# Patient Record
Sex: FEMALE | Race: WHITE | NOT HISPANIC OR LATINO | Employment: UNEMPLOYED | URBAN - METROPOLITAN AREA
[De-identification: names, ages, dates, MRNs, and addresses within clinical notes are randomized per-mention and may not be internally consistent; named-entity substitution may affect disease eponyms.]

---

## 2017-01-09 ENCOUNTER — APPOINTMENT (OUTPATIENT)
Dept: LAB | Facility: CLINIC | Age: 82
End: 2017-01-09
Payer: MEDICARE

## 2017-01-09 ENCOUNTER — TRANSCRIBE ORDERS (OUTPATIENT)
Dept: LAB | Facility: CLINIC | Age: 82
End: 2017-01-09

## 2017-01-09 DIAGNOSIS — I10 ESSENTIAL HYPERTENSION, MALIGNANT: ICD-10-CM

## 2017-01-09 DIAGNOSIS — E78.00 PURE HYPERCHOLESTEROLEMIA: ICD-10-CM

## 2017-01-09 DIAGNOSIS — E78.00 PURE HYPERCHOLESTEROLEMIA: Primary | ICD-10-CM

## 2017-01-09 LAB
ALBUMIN SERPL BCP-MCNC: 3.2 G/DL (ref 3.5–5)
ALP SERPL-CCNC: 72 U/L (ref 46–116)
ALT SERPL W P-5'-P-CCNC: 17 U/L (ref 12–78)
ANION GAP SERPL CALCULATED.3IONS-SCNC: 7 MMOL/L (ref 4–13)
AST SERPL W P-5'-P-CCNC: 13 U/L (ref 5–45)
BILIRUB SERPL-MCNC: 0.45 MG/DL (ref 0.2–1)
BUN SERPL-MCNC: 26 MG/DL (ref 5–25)
CALCIUM SERPL-MCNC: 8.9 MG/DL (ref 8.3–10.1)
CHLORIDE SERPL-SCNC: 106 MMOL/L (ref 100–108)
CHOLEST SERPL-MCNC: 150 MG/DL (ref 50–200)
CO2 SERPL-SCNC: 27 MMOL/L (ref 21–32)
CREAT SERPL-MCNC: 1.17 MG/DL (ref 0.6–1.3)
GFR SERPL CREATININE-BSD FRML MDRD: 43.1 ML/MIN/1.73SQ M
GLUCOSE SERPL-MCNC: 99 MG/DL (ref 65–140)
HDLC SERPL-MCNC: 59 MG/DL (ref 40–60)
LDLC SERPL CALC-MCNC: 70 MG/DL (ref 0–100)
POTASSIUM SERPL-SCNC: 3.8 MMOL/L (ref 3.5–5.3)
PROT SERPL-MCNC: 6.4 G/DL (ref 6.4–8.2)
SODIUM SERPL-SCNC: 140 MMOL/L (ref 136–145)
TRIGL SERPL-MCNC: 105 MG/DL

## 2017-01-09 PROCEDURE — 80061 LIPID PANEL: CPT

## 2017-01-09 PROCEDURE — 36415 COLL VENOUS BLD VENIPUNCTURE: CPT

## 2017-01-09 PROCEDURE — 80053 COMPREHEN METABOLIC PANEL: CPT

## 2017-01-17 ENCOUNTER — ALLSCRIPTS OFFICE VISIT (OUTPATIENT)
Dept: OTHER | Facility: OTHER | Age: 82
End: 2017-01-17

## 2017-03-06 ENCOUNTER — TRANSCRIBE ORDERS (OUTPATIENT)
Dept: ADMINISTRATIVE | Facility: HOSPITAL | Age: 82
End: 2017-03-06

## 2017-03-06 DIAGNOSIS — I25.10 CVD (CARDIOVASCULAR DISEASE): Primary | ICD-10-CM

## 2017-03-13 ENCOUNTER — HOSPITAL ENCOUNTER (OUTPATIENT)
Dept: RADIOLOGY | Facility: HOSPITAL | Age: 82
Discharge: HOME | End: 2017-03-13
Attending: INTERNAL MEDICINE
Payer: MEDICARE

## 2017-03-13 ENCOUNTER — HOSPITAL ENCOUNTER (OUTPATIENT)
Dept: NON INVASIVE DIAGNOSTICS | Facility: HOSPITAL | Age: 82
Discharge: HOME | End: 2017-03-13
Attending: INTERNAL MEDICINE
Payer: MEDICARE

## 2017-03-13 ENCOUNTER — GENERIC CONVERSION - ENCOUNTER (OUTPATIENT)
Dept: OTHER | Facility: OTHER | Age: 82
End: 2017-03-13

## 2017-03-13 DIAGNOSIS — I25.10 CVD (CARDIOVASCULAR DISEASE): ICD-10-CM

## 2017-03-13 PROCEDURE — 78452 HT MUSCLE IMAGE SPECT MULT: CPT

## 2017-03-13 PROCEDURE — A9502 TC99M TETROFOSMIN: HCPCS

## 2017-03-13 PROCEDURE — 93017 CV STRESS TEST TRACING ONLY: CPT

## 2017-03-13 RX ADMIN — REGADENOSON 0.4 MG: 0.08 INJECTION, SOLUTION INTRAVENOUS at 11:04

## 2017-04-16 ENCOUNTER — APPOINTMENT (EMERGENCY)
Dept: RADIOLOGY | Facility: HOSPITAL | Age: 82
End: 2017-04-16
Payer: MEDICARE

## 2017-04-16 ENCOUNTER — HOSPITAL ENCOUNTER (OUTPATIENT)
Facility: HOSPITAL | Age: 82
Setting detail: OBSERVATION
Discharge: HOME | End: 2017-04-17
Attending: EMERGENCY MEDICINE | Admitting: INTERNAL MEDICINE
Payer: MEDICARE

## 2017-04-16 DIAGNOSIS — I10 HTN (HYPERTENSION): ICD-10-CM

## 2017-04-16 DIAGNOSIS — R42 DIZZINESS: ICD-10-CM

## 2017-04-16 DIAGNOSIS — R42 VERTIGO: Primary | ICD-10-CM

## 2017-04-16 PROBLEM — E78.5 DYSLIPIDEMIA: Status: ACTIVE | Noted: 2017-04-16

## 2017-04-16 PROBLEM — M19.90 ARTHRITIS: Status: ACTIVE | Noted: 2017-04-16

## 2017-04-16 LAB
ALBUMIN SERPL BCP-MCNC: 3.2 G/DL (ref 3.5–5)
ALP SERPL-CCNC: 80 U/L (ref 46–116)
ALT SERPL W P-5'-P-CCNC: 27 U/L (ref 12–78)
ANION GAP SERPL CALCULATED.3IONS-SCNC: 9 MMOL/L (ref 4–13)
AST SERPL W P-5'-P-CCNC: 22 U/L (ref 5–45)
BASOPHILS # BLD AUTO: 0 THOUSANDS/ΜL (ref 0–0.1)
BASOPHILS NFR BLD AUTO: 0 % (ref 0–1)
BILIRUB SERPL-MCNC: 0.4 MG/DL (ref 0.2–1)
BILIRUB UR QL STRIP: NEGATIVE
BUN SERPL-MCNC: 23 MG/DL (ref 5–25)
CALCIUM SERPL-MCNC: 9.3 MG/DL (ref 8.3–10.1)
CHLORIDE SERPL-SCNC: 107 MMOL/L (ref 100–108)
CLARITY UR: CLEAR
CO2 SERPL-SCNC: 30 MMOL/L (ref 21–32)
COLOR UR: YELLOW
CREAT SERPL-MCNC: 0.98 MG/DL (ref 0.6–1.3)
EOSINOPHIL # BLD AUTO: 0.1 THOUSAND/ΜL (ref 0–0.61)
EOSINOPHIL NFR BLD AUTO: 1 % (ref 0–6)
ERYTHROCYTE [DISTWIDTH] IN BLOOD BY AUTOMATED COUNT: 13.4 % (ref 11.6–15.1)
GFR SERPL CREATININE-BSD FRML MDRD: 52.8 ML/MIN/1.73SQ M
GLUCOSE SERPL-MCNC: 119 MG/DL (ref 65–140)
GLUCOSE UR STRIP-MCNC: NEGATIVE MG/DL
HCT VFR BLD AUTO: 35.4 % (ref 37–47)
HGB BLD-MCNC: 11.6 G/DL (ref 12–16)
HGB UR QL STRIP.AUTO: NEGATIVE
KETONES UR STRIP-MCNC: NEGATIVE MG/DL
LEUKOCYTE ESTERASE UR QL STRIP: NEGATIVE
LYMPHOCYTES # BLD AUTO: 1 THOUSANDS/ΜL (ref 0.6–4.47)
LYMPHOCYTES NFR BLD AUTO: 12 % (ref 14–44)
MAGNESIUM SERPL-MCNC: 2.1 MG/DL (ref 1.6–2.6)
MCH RBC QN AUTO: 31.2 PG (ref 27–31)
MCHC RBC AUTO-ENTMCNC: 32.7 G/DL (ref 31.4–37.4)
MCV RBC AUTO: 95 FL (ref 82–98)
MONOCYTES # BLD AUTO: 0.7 THOUSAND/ΜL (ref 0.17–1.22)
MONOCYTES NFR BLD AUTO: 9 % (ref 4–12)
NEUTROPHILS # BLD AUTO: 6.1 THOUSANDS/ΜL (ref 1.85–7.62)
NEUTS SEG NFR BLD AUTO: 78 % (ref 43–75)
NITRITE UR QL STRIP: NEGATIVE
NRBC BLD AUTO-RTO: 0 /100 WBCS
PH UR STRIP.AUTO: 7 [PH] (ref 5–9)
PLATELET # BLD AUTO: 166 THOUSANDS/UL (ref 130–400)
PMV BLD AUTO: 8 FL (ref 8.9–12.7)
POTASSIUM SERPL-SCNC: 4.3 MMOL/L (ref 3.5–5.3)
PROT SERPL-MCNC: 6.3 G/DL (ref 6.4–8.2)
PROT UR STRIP-MCNC: NEGATIVE MG/DL
RBC # BLD AUTO: 3.71 MILLION/UL (ref 4.2–5.4)
SODIUM SERPL-SCNC: 146 MMOL/L (ref 136–145)
SP GR UR STRIP.AUTO: 1.01 (ref 1–1.03)
TROPONIN I SERPL-MCNC: <0.02 NG/ML
TROPONIN I SERPL-MCNC: <0.02 NG/ML
TSH SERPL DL<=0.05 MIU/L-ACNC: 2.05 UIU/ML (ref 0.36–3.74)
UROBILINOGEN UR QL STRIP.AUTO: 0.2 E.U./DL
WBC # BLD AUTO: 7.8 THOUSAND/UL (ref 4.8–10.8)

## 2017-04-16 PROCEDURE — 84484 ASSAY OF TROPONIN QUANT: CPT | Performed by: HOSPITALIST

## 2017-04-16 PROCEDURE — 81003 URINALYSIS AUTO W/O SCOPE: CPT | Performed by: EMERGENCY MEDICINE

## 2017-04-16 PROCEDURE — 99285 EMERGENCY DEPT VISIT HI MDM: CPT

## 2017-04-16 PROCEDURE — 83735 ASSAY OF MAGNESIUM: CPT | Performed by: EMERGENCY MEDICINE

## 2017-04-16 PROCEDURE — 71010 HB CHEST X-RAY 1 VIEW FRONTAL (PORTABLE): CPT

## 2017-04-16 PROCEDURE — 85025 COMPLETE CBC W/AUTO DIFF WBC: CPT | Performed by: EMERGENCY MEDICINE

## 2017-04-16 PROCEDURE — 84443 ASSAY THYROID STIM HORMONE: CPT | Performed by: EMERGENCY MEDICINE

## 2017-04-16 PROCEDURE — 36415 COLL VENOUS BLD VENIPUNCTURE: CPT | Performed by: EMERGENCY MEDICINE

## 2017-04-16 PROCEDURE — 84484 ASSAY OF TROPONIN QUANT: CPT | Performed by: EMERGENCY MEDICINE

## 2017-04-16 PROCEDURE — 70450 CT HEAD/BRAIN W/O DYE: CPT

## 2017-04-16 PROCEDURE — 96360 HYDRATION IV INFUSION INIT: CPT

## 2017-04-16 PROCEDURE — 93005 ELECTROCARDIOGRAM TRACING: CPT | Performed by: EMERGENCY MEDICINE

## 2017-04-16 PROCEDURE — 87081 CULTURE SCREEN ONLY: CPT | Performed by: HOSPITALIST

## 2017-04-16 PROCEDURE — 80053 COMPREHEN METABOLIC PANEL: CPT | Performed by: EMERGENCY MEDICINE

## 2017-04-16 RX ORDER — MECLIZINE HYDROCHLORIDE 25 MG/1
25 TABLET ORAL ONCE
Status: COMPLETED | OUTPATIENT
Start: 2017-04-16 | End: 2017-04-16

## 2017-04-16 RX ORDER — KETOROLAC TROMETHAMINE 5 MG/ML
1 SOLUTION OPHTHALMIC 4 TIMES DAILY
Status: DISCONTINUED | OUTPATIENT
Start: 2017-04-16 | End: 2017-04-17

## 2017-04-16 RX ORDER — METOPROLOL TARTRATE 50 MG/1
50 TABLET, FILM COATED ORAL EVERY 12 HOURS SCHEDULED
Status: DISCONTINUED | OUTPATIENT
Start: 2017-04-16 | End: 2017-04-17

## 2017-04-16 RX ORDER — PRAVASTATIN SODIUM 20 MG
10 TABLET ORAL
Status: DISCONTINUED | OUTPATIENT
Start: 2017-04-16 | End: 2017-04-17 | Stop reason: HOSPADM

## 2017-04-16 RX ORDER — ANTIOX #8/OM3/DHA/EPA/LUT/ZEAX 250-2.5 MG
CAPSULE ORAL
COMMUNITY
End: 2018-06-15

## 2017-04-16 RX ORDER — TRAMADOL HYDROCHLORIDE 50 MG/1
50 TABLET ORAL EVERY 8 HOURS PRN
Status: DISCONTINUED | OUTPATIENT
Start: 2017-04-16 | End: 2017-04-17 | Stop reason: HOSPADM

## 2017-04-16 RX ORDER — ASPIRIN 81 MG/1
81 TABLET, CHEWABLE ORAL DAILY
Status: DISCONTINUED | OUTPATIENT
Start: 2017-04-17 | End: 2017-04-17 | Stop reason: HOSPADM

## 2017-04-16 RX ADMIN — MECLIZINE HYDROCHLORIDE 25 MG: 25 TABLET ORAL at 14:34

## 2017-04-16 RX ADMIN — PRAVASTATIN SODIUM 10 MG: 20 TABLET ORAL at 18:08

## 2017-04-16 RX ADMIN — SODIUM CHLORIDE 500 ML: 0.9 INJECTION, SOLUTION INTRAVENOUS at 12:57

## 2017-04-16 RX ADMIN — METOPROLOL TARTRATE 50 MG: 50 TABLET ORAL at 21:22

## 2017-04-16 RX ADMIN — METOPROLOL TARTRATE 50 MG: 50 TABLET ORAL at 13:49

## 2017-04-17 ENCOUNTER — APPOINTMENT (OUTPATIENT)
Dept: RADIOLOGY | Facility: HOSPITAL | Age: 82
End: 2017-04-17
Payer: MEDICARE

## 2017-04-17 VITALS
SYSTOLIC BLOOD PRESSURE: 130 MMHG | BODY MASS INDEX: 24.69 KG/M2 | TEMPERATURE: 98.1 F | RESPIRATION RATE: 16 BRPM | DIASTOLIC BLOOD PRESSURE: 59 MMHG | HEART RATE: 57 BPM | HEIGHT: 65 IN | WEIGHT: 148.2 LBS | OXYGEN SATURATION: 98 %

## 2017-04-17 PROBLEM — I25.10 CAD (CORONARY ARTERY DISEASE): Status: ACTIVE | Noted: 2017-04-17

## 2017-04-17 LAB
ANION GAP SERPL CALCULATED.3IONS-SCNC: 7 MMOL/L (ref 4–13)
ATRIAL RATE: 69 BPM
BUN SERPL-MCNC: 23 MG/DL (ref 5–25)
CALCIUM SERPL-MCNC: 9 MG/DL (ref 8.3–10.1)
CHLORIDE SERPL-SCNC: 108 MMOL/L (ref 100–108)
CO2 SERPL-SCNC: 29 MMOL/L (ref 21–32)
CREAT SERPL-MCNC: 0.88 MG/DL (ref 0.6–1.3)
ERYTHROCYTE [DISTWIDTH] IN BLOOD BY AUTOMATED COUNT: 13.1 % (ref 11.6–15.1)
GFR SERPL CREATININE-BSD FRML MDRD: 59.8 ML/MIN/1.73SQ M
GLUCOSE SERPL-MCNC: 87 MG/DL (ref 65–140)
HCT VFR BLD AUTO: 32.5 % (ref 37–47)
HGB BLD-MCNC: 10.6 G/DL (ref 12–16)
MAGNESIUM SERPL-MCNC: 2.1 MG/DL (ref 1.6–2.6)
MCH RBC QN AUTO: 31.3 PG (ref 27–31)
MCHC RBC AUTO-ENTMCNC: 32.6 G/DL (ref 31.4–37.4)
MCV RBC AUTO: 96 FL (ref 82–98)
P AXIS: 76 DEGREES
PLATELET # BLD AUTO: 175 THOUSANDS/UL (ref 130–400)
PMV BLD AUTO: 8.5 FL (ref 8.9–12.7)
POTASSIUM SERPL-SCNC: 4.2 MMOL/L (ref 3.5–5.3)
PR INTERVAL: 202 MS
QRS AXIS: 45 DEGREES
QRSD INTERVAL: 86 MS
QT INTERVAL: 398 MS
QTC INTERVAL: 426 MS
RBC # BLD AUTO: 3.39 MILLION/UL (ref 4.2–5.4)
SODIUM SERPL-SCNC: 144 MMOL/L (ref 136–145)
T WAVE AXIS: 45 DEGREES
VENTRICULAR RATE: 69 BPM
WBC # BLD AUTO: 9.1 THOUSAND/UL (ref 4.8–10.8)

## 2017-04-17 PROCEDURE — 83735 ASSAY OF MAGNESIUM: CPT | Performed by: HOSPITALIST

## 2017-04-17 PROCEDURE — 85027 COMPLETE CBC AUTOMATED: CPT | Performed by: HOSPITALIST

## 2017-04-17 PROCEDURE — 80048 BASIC METABOLIC PNL TOTAL CA: CPT | Performed by: HOSPITALIST

## 2017-04-17 PROCEDURE — 93880 EXTRACRANIAL BILAT STUDY: CPT

## 2017-04-17 RX ORDER — MECLIZINE HCL 12.5 MG/1
12.5 TABLET ORAL EVERY 8 HOURS SCHEDULED
Qty: 9 TABLET | Refills: 0 | Status: SHIPPED | OUTPATIENT
Start: 2017-04-17 | End: 2018-06-15

## 2017-04-17 RX ORDER — MECLIZINE HCL 12.5 MG/1
12.5 TABLET ORAL EVERY 8 HOURS SCHEDULED
Status: DISCONTINUED | OUTPATIENT
Start: 2017-04-17 | End: 2017-04-17 | Stop reason: HOSPADM

## 2017-04-17 RX ADMIN — MECLIZINE HCL 12.5 MG: 12.5 TABLET ORAL at 14:13

## 2017-04-17 RX ADMIN — METOPROLOL TARTRATE 50 MG: 50 TABLET ORAL at 09:51

## 2017-04-17 RX ADMIN — LOSARTAN POTASSIUM: 50 TABLET, FILM COATED ORAL at 09:51

## 2017-04-17 RX ADMIN — KETOROLAC TROMETHAMINE 1 DROP: 5 SOLUTION OPHTHALMIC at 09:52

## 2017-04-17 RX ADMIN — ASPIRIN 81 MG CHEWABLE TABLET 81 MG: 81 TABLET CHEWABLE at 09:51

## 2017-04-17 RX ADMIN — PRAVASTATIN SODIUM 10 MG: 20 TABLET ORAL at 16:14

## 2017-04-18 LAB — MRSA NOSE QL CULT: NORMAL

## 2017-07-17 DIAGNOSIS — I25.2 OLD MYOCARDIAL INFARCTION: ICD-10-CM

## 2017-07-21 ENCOUNTER — APPOINTMENT (OUTPATIENT)
Dept: LAB | Facility: CLINIC | Age: 82
End: 2017-07-21
Payer: MEDICARE

## 2017-07-21 DIAGNOSIS — I25.2 OLD MYOCARDIAL INFARCTION: Primary | ICD-10-CM

## 2017-07-21 LAB
ALBUMIN SERPL BCP-MCNC: 3.1 G/DL (ref 3.5–5)
ALP SERPL-CCNC: 80 U/L (ref 46–116)
ALT SERPL W P-5'-P-CCNC: 18 U/L (ref 12–78)
ANION GAP SERPL CALCULATED.3IONS-SCNC: 7 MMOL/L (ref 4–13)
AST SERPL W P-5'-P-CCNC: 17 U/L (ref 5–45)
BILIRUB SERPL-MCNC: 0.32 MG/DL (ref 0.2–1)
BUN SERPL-MCNC: 22 MG/DL (ref 5–25)
CALCIUM SERPL-MCNC: 8.8 MG/DL (ref 8.3–10.1)
CHLORIDE SERPL-SCNC: 109 MMOL/L (ref 100–108)
CHOLEST SERPL-MCNC: 160 MG/DL (ref 50–200)
CO2 SERPL-SCNC: 27 MMOL/L (ref 21–32)
CREAT SERPL-MCNC: 0.98 MG/DL (ref 0.6–1.3)
GFR SERPL CREATININE-BSD FRML MDRD: 52.8 ML/MIN/1.73SQ M
GLUCOSE P FAST SERPL-MCNC: 81 MG/DL (ref 65–99)
HDLC SERPL-MCNC: 51 MG/DL (ref 40–60)
LDLC SERPL CALC-MCNC: 78 MG/DL (ref 0–100)
POTASSIUM SERPL-SCNC: 4.1 MMOL/L (ref 3.5–5.3)
PROT SERPL-MCNC: 6.4 G/DL (ref 6.4–8.2)
SODIUM SERPL-SCNC: 143 MMOL/L (ref 136–145)
TRIGL SERPL-MCNC: 153 MG/DL

## 2017-07-21 PROCEDURE — 80053 COMPREHEN METABOLIC PANEL: CPT

## 2017-07-21 PROCEDURE — 80061 LIPID PANEL: CPT

## 2017-07-21 PROCEDURE — 36415 COLL VENOUS BLD VENIPUNCTURE: CPT

## 2017-07-25 ENCOUNTER — ALLSCRIPTS OFFICE VISIT (OUTPATIENT)
Dept: OTHER | Facility: OTHER | Age: 82
End: 2017-07-25

## 2018-01-10 ENCOUNTER — TRANSCRIBE ORDERS (OUTPATIENT)
Dept: ADMINISTRATIVE | Facility: HOSPITAL | Age: 83
End: 2018-01-10

## 2018-01-10 ENCOUNTER — ALLSCRIPTS OFFICE VISIT (OUTPATIENT)
Dept: OTHER | Facility: OTHER | Age: 83
End: 2018-01-10

## 2018-01-10 DIAGNOSIS — I25.2 OLD MYOCARDIAL INFARCTION: ICD-10-CM

## 2018-01-10 DIAGNOSIS — I25.10 ATHEROSCLEROSIS OF NATIVE CORONARY ARTERY WITHOUT ANGINA PECTORIS, UNSPECIFIED WHETHER NATIVE OR TRANSPLANTED HEART: Primary | ICD-10-CM

## 2018-01-12 NOTE — PROGRESS NOTES
Assessment   Assessed    1  Abnormal EKG (794 31) (R94 31)   2  CAD (coronary artery disease) (414 00) (I25 10)   3  Aortic valve sclerosis (424 1) (I35 8)   4  CKD (chronic kidney disease), stage III (585 3) (N18 3)   5  Dyslipidemia (272 4) (E78 5)   6  HTN (hypertension) (401 9) (I10)   7  Mitral and aortic regurgitation (396 3) (I08 0)   8  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 (412) (I25 2)   9  Status post placement of stent in right coronary artery (V45 82) (Z95 5)    Plan   Aortic valve sclerosis, CAD (coronary artery disease), CKD (chronic kidney disease),    stage III, Health Maintenance    · (1) COMPREHENSIVE METABOLIC PANEL; Status:Active; Requested TNA:[id number]; Perform:[facility]; RBP:[id number];GUTIBBL; For:Aortic valve sclerosis, CAD (coronary artery disease), CKD (chronic kidney disease), stage III, Health Maintenance; Ordered By:Steven Gomez;  CAD (coronary artery disease), HTN (hypertension), Mitral and aortic regurgitation    · EKG/ECG- POC; Status:Complete;   Done: [id number]   Perform: In Office; Due:10Jan2019; Last Updated By:Mitra Bennett; 1/10/2018 1:04:03 PM;Ordered; For:CAD (coronary artery disease), HTN (hypertension), Mitral and aortic regurgitation; Ordered By:Steven Gomez;  CAD (coronary artery disease), 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    · ECHO COMPLETE WITH CONTRAST IF INDICATED; Status:Need Information - Financial    Authorization; Requested for:02Apr2018; Perform:[facility]; Order Comments:New inferolateral Q-waves and new T inversions in inferior leads  Please evaluate for regional wall motion abnormality involving inferolateral segments ; Due:02Apr2019; Last Updated Peyton Reyes; 1/10/2018 1:58:14 PM;Ordered; For:CAD (coronary artery disease), 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; Ordered By:Steven Gomez;  Dyslipidemia    · (1) LIPID PANEL, FASTING; Status:Active; Requested [facility]:[id number];     Perform:[facility]; FSP:[id number];SHVUQWS; For:Dyslipidemia; Ordered By:Steven Gomez;           1  Take morning and evening blood pressures for 4 consecutive days, doing 3 readings at a time, meaning 3 each morning and evening  Write down every measurement and either bring them to the office of Dr Mahi Steve, mail them, e-mail them, or fax them  2   Echocardiogram at [facility] to be done April, 2018 regarding new EKG findings  3   Blood work to be done on or after June 24, 2018 with office follow-up with Dr Mahi Steve in July, 2018, with EKG  Discussion/Summary   Cardiology Discussion Summary Free Text Note Form [facility]:       Stable CAD with old inferior infarct and PCI of RCA 11/18/13 but new EKG changes of inferolateral myocardial infarction compared to 6 months ago  Murmurs of aortic valve sclerosis and mitral regurgitation with known aortic valve sclerosis and MAC as well as trace aortic regurgitation on 7/19/16 echocardiogram Stable mild CKD 3 with history of dehydration and YECENIA 3  Status post symptomatic bradycardia as metoprolol adverse drug reaction 8/15 History of resolved aerophagia and questionable hematuria History of noncritical disease of LAD and LCx on 11/18/13 cardiac catheterization with normal nuclear stress test on 3/13/17  Controlled dyslipidemia Uncontrolled systolic hypertension, stage II  Exclude white coat phenomenon  Osteoarthritis and questionable rheumatoid arthritis             Goals and Barriers: The patient has the current Goals: Maintenance of cardiovascular health  The patent has the current Barriers: Advancing age and frailty  Patient's Capacity to Self-Care: Patient is able to Self-Care  Patient Education: Educational resources provided: Patient and  were given printed instructions explaining plan of care  Medication SE Review and Pt Understands Tx: The treatment plan was reviewed with the patient/guardian   The patient/guardian understands and agrees with the treatment plan    Counseling Documentation With Imm: The patient, patient's family was counseled regarding diagnostic results,-- instructions for management,-- risk factor reductions,-- prognosis,-- patient and family education,-- impressions,-- importance of compliance with treatment  Self Referrals:    Self Referrals: Yes    Transitional Care: Co-manage care with PCP/Referring Provider  Chief Complaint   Chief Complaint Free Text Note Form: Chuck Thompson is here today for a six month followup  She denies any cardiac complaitns in the office today  An EKG was done in the office  JW    Chief Complaint Chronic Condition [facility]: Patient is here today for follow up of chronic conditions described in HPI  History of Present Illness   Cardiology HPI Free Text Note Form [facility]:    51-year-old woman accompanied by her  complains of declining energy level and moving about slowly but denies any episodes of chest discomfort, abdominal pain, dyspnea, falls, syncope, edema, palpitations  There have been no specific cardiopulmonary or new medical symptoms except for slight lightheadedness from time to time  No medication changes have occurred  Review of Systems   ROS Reviewed:    ROS reviewed  All other systems negative, except as noted in history of present illness  Active Problems   Problems    1  Aortic valve sclerosis (424 1) (I35 8)   2  CAD (coronary artery disease) (414 00) (I25 10)   3  CKD (chronic kidney disease), stage III (585 3) (N18 3)   4  Dyslipidemia (272 4) (E78 5)   5  HTN (hypertension) (401 9) (I10)   6  Indigestion (536 8) (K30)   7  Mitral and aortic regurgitation (396 3) (I08 0)   8  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 (412) (I25 2)   9  Osteoarthritis (715 90) (M19 90)   10  Status post placement of stent in right coronary artery (V45 82) (Z95 5)    Past Medical History   Active Problems And Past Medical History Reviewed:     The active problems and past medical history were reviewed and updated today  Surgical History   Problems    1  History of Appendectomy   2  History of Oophorectomy   3  History of Tonsillectomy  Surgical History Reviewed: The surgical history was reviewed and updated today  Family History   Mother    1  No pertinent family history  Family History    2  No pertinent family history  Family History Reviewed: The family history was reviewed and updated today  Social History   Problems    · Never a smoker   · Rarely consumes alcohol (V49 89) (Z78 9)  Social History Reviewed: The social history was reviewed and is unchanged  Current Meds    1  Aspirin 81 MG CAPS; Therapy: (Recorded:17Jan2017) to Recorded   2  Losartan Potassium-HCTZ 100-25 MG Oral Tablet; TAKE 1 TABLET DAILY  Requested     for: 34Utw9969; Last Rx:[id number] Ordered   3  PreserVision AREDS Oral Capsule; TAKE AS DIRECTED; Therapy: 28SRQ1229 to Recorded   4  Simvastatin 5 MG Oral Tablet; TAKE 1 TABLET AT BEDTIME  Requested for: 19Apr2017;     Last Rx:19Apr2017 Ordered   5  TraMADol HCl - 50 MG Oral Tablet; TAKE 1 TABLET EVERY 12 HOURS AS NEEDED; Therapy: 14XZR2196 to (Evaluate:45Iwr6959) Recorded  Medication List Reviewed: The medication list was reviewed and updated today  Allergies   Medication    1  No Known Drug Allergies    Vitals   Vital Signs    Recorded: 54YDC2261 01:13PM   Heart Rate 80, Apical   Systolic 717, RUE, Sitting   Diastolic 65, RUE, Sitting   BP Comments End of exam   Height 5 ft 2 in   Weight 149 lb    BMI Calculated 27 25   BSA Calculated 1 69   O2 Saturation 92, RA     Physical Exam        Constitutional - General appearance: No acute distress, well appearing and well nourished  Eyes - Conjunctiva and Sclera examination: Conjunctiva pink, sclera anicteric  Neck - Normal, no JVD   Pulmonary - Respiratory effort: No signs of respiratory distress  -- Auscultation of lungs: Clear to auscultation  Cardiovascular - Auscultation of heart: Abnormal  -- Grade 2/6 aortic and apical systolic ejection murmurs with no gallop, click, rub  Systolic murmur radiates to both clavicles  -- Pedal pulses: Abnormal  -- Difficult to palpate but both feet are equally warm  -- Examination of extremities for edema and/or varicosities: Normal        Abdomen - Soft  Musculoskeletal - Gait and station: Normal gait  Inspection/palpation of joints, bones, and muscles: Abnormal-- Osteoarthritis changes in knees and fingers  Skin - Skin: Normal without rashes  Skin is warm and well perfused  Neurologic - Speech normal  No focal deficits        Psychiatric - Orientation to person, place, and time: Normal       Results/Data   ECG Report:  01/10/2018    new inferolateral myocardial infarction with new inferolateral Q-waves in inferior T inversions compared to 07/25/2017 R-wave progression            Signatures    Electronically signed by : GAVIN Conley ; Aj 10 2018  4:02PM EST                       (Author)

## 2018-01-13 VITALS
WEIGHT: 150.4 LBS | BODY MASS INDEX: 27.68 KG/M2 | OXYGEN SATURATION: 95 % | HEART RATE: 74 BPM | HEIGHT: 62 IN | SYSTOLIC BLOOD PRESSURE: 126 MMHG | DIASTOLIC BLOOD PRESSURE: 68 MMHG

## 2018-01-14 VITALS
OXYGEN SATURATION: 95 % | WEIGHT: 151.13 LBS | HEART RATE: 73 BPM | SYSTOLIC BLOOD PRESSURE: 130 MMHG | HEIGHT: 62 IN | BODY MASS INDEX: 27.81 KG/M2 | DIASTOLIC BLOOD PRESSURE: 70 MMHG

## 2018-01-23 VITALS
WEIGHT: 149 LBS | HEIGHT: 62 IN | OXYGEN SATURATION: 92 % | SYSTOLIC BLOOD PRESSURE: 145 MMHG | HEART RATE: 80 BPM | DIASTOLIC BLOOD PRESSURE: 65 MMHG | BODY MASS INDEX: 27.42 KG/M2

## 2018-02-13 RX ORDER — VIT A/VIT C/VIT E/ZINC/COPPER 4296-226
CAPSULE ORAL
COMMUNITY
Start: 2017-07-25 | End: 2021-01-01 | Stop reason: HOSPADM

## 2018-02-13 RX ORDER — AMOXICILLIN 500 MG/1
500 CAPSULE ORAL AS NEEDED
Refills: 0 | COMMUNITY
Start: 2018-02-08 | End: 2019-02-03 | Stop reason: HOSPADM

## 2018-02-13 RX ORDER — LOSARTAN POTASSIUM AND HYDROCHLOROTHIAZIDE 25; 100 MG/1; MG/1
1 TABLET ORAL DAILY
COMMUNITY
End: 2018-05-21 | Stop reason: SDUPTHER

## 2018-02-13 RX ORDER — AMLODIPINE BESYLATE 5 MG/1
1 TABLET ORAL DAILY
COMMUNITY
Start: 2018-01-17 | End: 2018-02-16 | Stop reason: SDUPTHER

## 2018-02-16 ENCOUNTER — OFFICE VISIT (OUTPATIENT)
Dept: CARDIOLOGY CLINIC | Facility: CLINIC | Age: 83
End: 2018-02-16
Payer: MEDICARE

## 2018-02-16 VITALS
BODY MASS INDEX: 24.66 KG/M2 | HEIGHT: 65 IN | SYSTOLIC BLOOD PRESSURE: 118 MMHG | HEART RATE: 78 BPM | OXYGEN SATURATION: 98 % | DIASTOLIC BLOOD PRESSURE: 62 MMHG | WEIGHT: 148 LBS

## 2018-02-16 DIAGNOSIS — I10 ESSENTIAL HYPERTENSION: Primary | ICD-10-CM

## 2018-02-16 DIAGNOSIS — I25.10 CORONARY ARTERY DISEASE INVOLVING NATIVE CORONARY ARTERY OF NATIVE HEART WITHOUT ANGINA PECTORIS: ICD-10-CM

## 2018-02-16 DIAGNOSIS — E78.5 DYSLIPIDEMIA: ICD-10-CM

## 2018-02-16 PROCEDURE — 99214 OFFICE O/P EST MOD 30 MIN: CPT | Performed by: INTERNAL MEDICINE

## 2018-02-16 RX ORDER — AMLODIPINE BESYLATE 5 MG/1
5 TABLET ORAL DAILY
Qty: 30 TABLET | Refills: 5 | Status: SHIPPED | OUTPATIENT
Start: 2018-02-16 | End: 2019-02-15 | Stop reason: SDUPTHER

## 2018-02-16 NOTE — PROGRESS NOTES
Cardiology Progress Note    ASSESSMENT:    1  Improved uncontrolled systolic hypertension, stage II with addition of amlodipine 5 mg daily about 1 month ago  2  Stable CAD with old inferior infarct and PCI of RCA 11/18/13 but new EKG changes of inferolateral myocardial infarction compared to 6 months ago  3  Murmurs of aortic valve sclerosis and mitral regurgitation with known aortic valve sclerosis and MAC as well as trace aortic regurgitation on 7/19/16 echocardiogram   4  Stable mild CKD 3 with history of dehydration and YECENIA 3    5  Status post symptomatic bradycardia as metoprolol adverse drug reaction 8/15   6  History of resolved aerophagia and questionable hematuria   7  History of noncritical disease of LAD and LCx on 11/18/13 cardiac catheterization with normal nuclear stress test on 3/13/17  8  Controlled dyslipidemia   9  Osteoarthritis and questionable rheumatoid arthritis  10  Frequent extrasystoles on today's examination  Plan     Patient Instructions     1  Continue present medications  2   Spur prescription was sent to her local pharmacy for amlodipine 5 mg daily with 30 day supply and 5 refills ordered  3   Echo Doppler to be done in April, 2018  4   Office follow-up in July, 2018 with preceding blood work previously requested an to be done several weeks before visit  5  In view of asymptomatic extrasystoles on current exam, will consider EKG or Holter monitor if still present on follow-up  6   Four consecutive day blood pressure recheck in the next several weeks by Dr Katrina Anthony  HPI  This 80 y o  female  denies new cardiopulmonary and medical symptoms  She continues to complain of chronic stable fatigue and occasional lightheadedness      Review of Systems    All other systems negative, except as noted in history of present illness    Historical Information   Past Medical History:   Diagnosis Date    Abnormal EKG     Aortic valve stenosis     Arthritis     CKD (chronic kidney disease), stage III     Coronary artery disease     emergent PCI 11/18/13    Hypertension     Mitral and aortic regurgitation     Myocardial infarction 11/2013    with 1 stent     Past Surgical History:   Procedure Laterality Date    APPENDECTOMY      BILATERAL OOPHORECTOMY      CORONARY STENT PLACEMENT  2013    after MI (x1 stent)    DILATION AND CURETTAGE OF UTERUS      JOINT REPLACEMENT Left     knee    MA REMV CATARACT EXTRACAP,INSERT LENS Left 12/19/2016    Procedure: EXTRACTION EXTRACAPSULAR CATARACT PHACO INTRAOCULAR LENS (IOL); Surgeon: Katie Cook MD;  Location: [facility] MAIN OR;  Service: Ophthalmology    TONSILLECTOMY       History   Alcohol Use    Yes     Comment: drinks whiskey 2 x week     History   Drug Use No     History   Smoking Status    Never Smoker   Smokeless Tobacco    Never Used       Family History:  Family History   Problem Relation Age of Onset    Heart disease Sister      enlarged heart         Meds/Allergies     Prior to Admission medications    Medication Sig Start Date End Date Taking?  Authorizing Provider   amLODIPine (NORVASC) 5 mg tablet Take 1 tablet (5 mg total) by mouth daily for 30 days 2/16/18 3/18/18 Yes Mitzi Pritchard MD   aspirin 81 MG tablet Take 81 mg by mouth 2 (two) times a day   Yes Historical Provider, MD   losartan-hydrochlorothiazide (HYZAAR) 100-25 MG per tablet Take 1 tablet by mouth daily   Yes Historical Provider, MD   Multiple Vitamins-Minerals (PRESERVISION AREDS 2) CAPS Take by mouth   Yes Historical Provider, MD   Multiple Vitamins-Minerals (PRESERVISION AREDS) CAPS Take by mouth 7/25/17  Yes Historical Provider, MD   SIMVASTATIN PO Take 5 mg by mouth daily at bedtime   Yes Historical Provider, MD   amLODIPine (NORVASC) 5 mg tablet Take 1 tablet by mouth daily 1/17/18 2/16/18 Yes Historical Provider, MD   amoxicillin (AMOXIL) 500 mg capsule Take 500 mg by mouth 3 (three) times a day 2/8/18   Historical Provider, MD   meclizine (ANTIVERT) 12 5 MG tablet Take 1 tablet by mouth every 8 (eight) hours for 3 days 4/17/17 4/20/17  Lennox Fitz, MD   traMADol (ULTRAM) 50 mg tablet Take 50 mg by mouth every 8 (eight) hours as needed for moderate pain    Historical Provider, MD       Allergies   Allergen Reactions    Metoprolol Other (See Comments)     According to previous medical record, you developed bradycardia and pre-syncope in 2015 while on metoprolol ER 25mg, so please DO NOT take metoprolol or other beta blocker  Vitals:    02/16/18 1306   BP: 118/62   BP Location: Right arm   Patient Position: Sitting   Cuff Size: Standard   Pulse: 78   SpO2: 98%   Weight: 67 1 kg (148 lb)   Height: 5' 5" (1 651 m)       Body mass index is 24 63 kg/m²    1 lb weight loss in 5 weeks    Physical Exam:    General Appearance:  Alert, cooperative, no distress, appears stated age   Head:  Normocephalic, without obvious abnormality, atraumatic   Eyes:  PERRL, conjunctiva/corneas clear, EOM's intact,   both eyes   Ears:  Normal TM's and external ear canals, both ears   Nose: Nares normal, septum midline, mucosa normal, no drainage or sinus tenderness   Throat: Lips, mucosa, and tongue normal; teeth and gums normal   Neck: Supple, symmetrical, trachea midline, no adenopathy, thyroid: not enlarged, symmetric, no tenderness/mass/nodules, no carotid bruit or JVD   Back:   Symmetric, no curvature, ROM normal, no CVA tenderness   Lungs:   Clear to auscultation bilaterally, respirations unlabored   Chest Wall:  No tenderness or deformity   Heart:  Frequent extrasystoles are noted, S1, S2 normal, grade 6-5/1 systolic ejection murmur, best heard in aortic region with radiation to right more than left sternal border, right clavicle, and apex with normal A2 but no rub or gallop   Abdomen:   Soft, non-tender, bowel sounds active all four quadrants,  no masses, no organomegaly   Extremities: Extremities normal, atraumatic, no cyanosis or edema There are changes of osteoarthritis in bilateral knees and fingers  Pulses: Not readily palpable in both feet  Skin: Skin showed normal color, texture, turgor and no rashes or lesions   Lymph nodes: Cervical, supraclavicular, and axillary nodes normal   Neurologic: Normal         Cardiographics  ECG :  Not applicable    Imaging  Chest X-Ray:  No Chest XR results available for this patient            Lab Review     Lab Results   Component Value Date     07/21/2017    K 4 1 07/21/2017     (H) 07/21/2017    CO2 27 07/21/2017    ANIONGAP 7 07/21/2017    BUN 22 07/21/2017    CREATININE 0 98 07/21/2017    GLUCOSE 87 04/17/2017    GLUF 81 07/21/2017    CALCIUM 8 8 07/21/2017    AST 17 07/21/2017    ALT 18 07/21/2017    ALKPHOS 80 07/21/2017    PROT 6 4 07/21/2017    BILITOT 0 32 07/21/2017    EGFR 52 8 07/21/2017       Lab Results   Component Value Date    CHOL 160 07/21/2017    CHOL 150 01/09/2017     Lab Results   Component Value Date    HDL 51 07/21/2017    HDL 59 01/09/2017     Lab Results   Component Value Date    LDLCALC 78 07/21/2017    LDLCALC 70 01/09/2017     Lab Results   Component Value Date    TRIG 153 (H) 07/21/2017    TRIG 105 01/09/2017     No components found for: CHOLHDL    Lab Results   Component Value Date    GLUCOSE 87 04/17/2017    CALCIUM 8 8 07/21/2017     07/21/2017    K 4 1 07/21/2017    CO2 27 07/21/2017     (H) 07/21/2017    BUN 22 07/21/2017    CREATININE 0 98 07/21/2017           Helen Campbell MD

## 2018-02-16 NOTE — PATIENT INSTRUCTIONS
1   Continue present medications  2   Spur prescription was sent to her local pharmacy for amlodipine 5 mg daily with 30 day supply and 5 refills ordered  3   Echo Doppler to be done in April, 2018  4   Office follow-up in July, 2018 with preceding blood work previously requested an to be done several weeks before visit  5  In view of asymptomatic extrasystoles on current exam, will consider EKG or Holter monitor if still present on follow-up  6   Four consecutive day blood pressure recheck in the next several weeks by Dr Ousmane Yang

## 2018-04-03 ENCOUNTER — HOSPITAL ENCOUNTER (OUTPATIENT)
Dept: NON INVASIVE DIAGNOSTICS | Facility: HOSPITAL | Age: 83
Discharge: HOME | End: 2018-04-03
Attending: INTERNAL MEDICINE
Payer: MEDICARE

## 2018-04-03 DIAGNOSIS — I25.2 OLD MYOCARDIAL INFARCTION: ICD-10-CM

## 2018-04-03 DIAGNOSIS — I25.10 ATHEROSCLEROSIS OF NATIVE CORONARY ARTERY WITHOUT ANGINA PECTORIS, UNSPECIFIED WHETHER NATIVE OR TRANSPLANTED HEART: ICD-10-CM

## 2018-04-03 PROCEDURE — 93306 TTE W/DOPPLER COMPLETE: CPT

## 2018-04-04 ENCOUNTER — TELEPHONE (OUTPATIENT)
Dept: CARDIOLOGY CLINIC | Facility: CLINIC | Age: 83
End: 2018-04-04

## 2018-04-04 PROCEDURE — 93306 TTE W/DOPPLER COMPLETE: CPT | Performed by: INTERNAL MEDICINE

## 2018-04-24 ENCOUNTER — TELEPHONE (OUTPATIENT)
Dept: CARDIOLOGY CLINIC | Facility: CLINIC | Age: 83
End: 2018-04-24

## 2018-04-24 NOTE — TELEPHONE ENCOUNTER
Dr Brian Steinberg called with a couple of blood pressure readings in regards to Amlodipine  He unfortunately does not have dates or times for any of the readings given       154/54  112/58  140/61  153/64  162/70  145/55  136/58  138/59

## 2018-04-25 NOTE — TELEPHONE ENCOUNTER
Average systolic blood pressure approximately 142, which is probably acceptable for patient of this age  No change in current management recommended

## 2018-05-14 DIAGNOSIS — E78.5 DYSLIPIDEMIA: Primary | ICD-10-CM

## 2018-05-15 RX ORDER — SIMVASTATIN 5 MG
5 TABLET ORAL
Qty: 90 TABLET | Refills: 3 | Status: SHIPPED | OUTPATIENT
Start: 2018-05-15 | End: 2018-07-25 | Stop reason: ALTCHOICE

## 2018-05-21 DIAGNOSIS — I10 HYPERTENSION, UNSPECIFIED TYPE: Primary | ICD-10-CM

## 2018-05-21 DIAGNOSIS — I25.2 OLD MI (MYOCARDIAL INFARCTION): ICD-10-CM

## 2018-05-21 DIAGNOSIS — E78.5 DYSLIPIDEMIA: ICD-10-CM

## 2018-05-21 RX ORDER — SIMVASTATIN 5 MG
5 TABLET ORAL
Qty: 90 TABLET | Refills: 3 | Status: CANCELLED | OUTPATIENT
Start: 2018-05-21

## 2018-05-22 RX ORDER — LOSARTAN POTASSIUM AND HYDROCHLOROTHIAZIDE 25; 100 MG/1; MG/1
1 TABLET ORAL DAILY
Qty: 90 TABLET | Refills: 3 | Status: SHIPPED | OUTPATIENT
Start: 2018-05-22 | End: 2019-06-19 | Stop reason: SDUPTHER

## 2018-05-22 NOTE — TELEPHONE ENCOUNTER
Pt wanted to be sure this Rx was sent to [facility] today  To Dr Carlitos Cooper for signature

## 2018-05-30 ENCOUNTER — APPOINTMENT (OUTPATIENT)
Dept: LAB | Facility: CLINIC | Age: 83
End: 2018-05-30
Payer: MEDICARE

## 2018-05-30 DIAGNOSIS — N18.30 CHRONIC KIDNEY DISEASE, STAGE III (MODERATE) (HCC): ICD-10-CM

## 2018-05-30 DIAGNOSIS — I25.119 ATHEROSCLEROSIS OF NATIVE CORONARY ARTERY WITH ANGINA PECTORIS, UNSPECIFIED WHETHER NATIVE OR TRANSPLANTED HEART (HCC): ICD-10-CM

## 2018-05-30 DIAGNOSIS — R00.8 CANNONBALL PULSE: Primary | ICD-10-CM

## 2018-05-30 LAB
ALBUMIN SERPL BCP-MCNC: 2.9 G/DL (ref 3.5–5)
ALP SERPL-CCNC: 74 U/L (ref 46–116)
ALT SERPL W P-5'-P-CCNC: 20 U/L (ref 12–78)
ANION GAP SERPL CALCULATED.3IONS-SCNC: 9 MMOL/L (ref 4–13)
AST SERPL W P-5'-P-CCNC: 16 U/L (ref 5–45)
BILIRUB SERPL-MCNC: 0.36 MG/DL (ref 0.2–1)
BUN SERPL-MCNC: 23 MG/DL (ref 5–25)
CALCIUM SERPL-MCNC: 8.8 MG/DL (ref 8.3–10.1)
CHLORIDE SERPL-SCNC: 104 MMOL/L (ref 100–108)
CHOLEST SERPL-MCNC: 194 MG/DL (ref 50–200)
CO2 SERPL-SCNC: 24 MMOL/L (ref 21–32)
CREAT SERPL-MCNC: 1.24 MG/DL (ref 0.6–1.3)
GFR SERPL CREATININE-BSD FRML MDRD: 37 ML/MIN/1.73SQ M
GLUCOSE P FAST SERPL-MCNC: 96 MG/DL (ref 65–99)
HDLC SERPL-MCNC: 46 MG/DL (ref 40–60)
LDLC SERPL CALC-MCNC: 110 MG/DL (ref 0–100)
NONHDLC SERPL-MCNC: 148 MG/DL
POTASSIUM SERPL-SCNC: 3.9 MMOL/L (ref 3.5–5.3)
PROT SERPL-MCNC: 6.2 G/DL (ref 6.4–8.2)
SODIUM SERPL-SCNC: 137 MMOL/L (ref 136–145)
TRIGL SERPL-MCNC: 191 MG/DL

## 2018-05-30 PROCEDURE — 36415 COLL VENOUS BLD VENIPUNCTURE: CPT

## 2018-05-30 PROCEDURE — 80061 LIPID PANEL: CPT

## 2018-05-30 PROCEDURE — 80053 COMPREHEN METABOLIC PANEL: CPT

## 2018-06-15 NOTE — PRE-PROCEDURE INSTRUCTIONS
Pre-Surgery Instructions:   Medication Instructions    amLODIPine (NORVASC) 5 mg tablet Instructed patient per Anesthesia Guidelines   amoxicillin (AMOXIL) 500 mg capsule Instructed patient per Anesthesia Guidelines   aspirin 81 MG tablet Instructed patient per Anesthesia Guidelines   losartan-hydrochlorothiazide (HYZAAR) 100-25 MG per tablet Instructed patient per Anesthesia Guidelines   Multiple Vitamins-Minerals (PRESERVISION AREDS) CAPS Instructed patient per Anesthesia Guidelines   simvastatin (ZOCOR) 5 MG tablet Instructed patient per Anesthesia Guidelines  Pre op instructions reviewed with pt via phone  Instructed to take losartan a m of surgery    Rubi Espinosa, no cell phone, staying with patient during procedure  My Surgical Experience    The following information was developed to assist you to prepare for your operation  What do I need to do before coming to the hospital?   Arrange for a responsible person to drive you to and from the hospital    Arrange care for your children at home  Children are not allowed in the recovery areas of the hospital   Plan to wear clothing that is easy to put on and take off  If you are having shoulder surgery, wear a shirt that buttons or zippers in the front  Bathing  o Shower the evening before and the morning of your surgery with an antibacterial soap  Please refer to the Pre Op Showering Instructions for Surgery Patients Sheet   o Remove nail polish and all body piercing jewelry  o Do not shave any body part for at least 24 hours before surgery-this includes face, arms, legs and upper body  Food  o Nothing to eat or drink after midnight the night before your surgery   This includes candy and chewing gum  o Exception: If your surgery is after 12:00pm (noon), you may have clear liquids such as 7-Up®, ginger ale, apple or cranberry juice, Jell-O®, water, or clear broth until 8:00 am  o Do not drink milk or juice with pulp on the morning before surgery  o Do not drink alcohol 24 hours before surgery  Medicine  o Follow instructions you received from your surgeon about which medicines you may take on the day of surgery  o If instructed to take medicine on the morning of surgery, take pills with just a small sip of water  Call your prescribing doctor for specific information on what to do if you take insulin    What should I bring to the hospital?    Bring:  Shahid Charisse or a walker, if you have them, for foot or knee surgery   A list of the daily medicines, vitamins, minerals, herbals and nutritional supplements you take  Include the dosages of medicines and the time you take them each day   Glasses, dentures or hearing aids   Minimal clothing; you will be wearing hospital sleepwear   Photo ID; required to verify your identity   If you have a Living Will or Power of , bring a copy of the documents   If you have an ostomy, bring an extra pouch and any supplies you use    Do not bring   Medicines or inhalers   Money, valuables or jewelry    What other information should I know about the day of surgery?  Notify your surgeons if you develop a cold, sore throat, cough, fever, rash or any other illness   Report to the Ambulatory Surgical/Same Day Surgery Unit   You will be instructed to stop at Registration only if you have not been pre-registered   Inform your  fi they do not stay that they will be asked by the staff to leave a phone number where they can be reached   Be available to be reached before surgery  In the event the operating room schedule changes, you may be asked to come in earlier or later than expected    *It is important to tell your doctor and others involved in your health care if you are taking or have been taking any non-prescription drugs, vitamins, minerals, herbals or other nutritional supplements   Any of these may interact with some food or medicines and cause a reaction

## 2018-06-17 ENCOUNTER — ANESTHESIA EVENT (OUTPATIENT)
Dept: PERIOP | Facility: AMBULARY SURGERY CENTER | Age: 83
End: 2018-06-17
Payer: MEDICARE

## 2018-06-18 ENCOUNTER — ANESTHESIA (OUTPATIENT)
Dept: PERIOP | Facility: AMBULARY SURGERY CENTER | Age: 83
End: 2018-06-18
Payer: MEDICARE

## 2018-06-18 ENCOUNTER — HOSPITAL ENCOUNTER (OUTPATIENT)
Facility: AMBULARY SURGERY CENTER | Age: 83
Setting detail: OUTPATIENT SURGERY
Discharge: HOME | End: 2018-06-18
Attending: OPHTHALMOLOGY | Admitting: OPHTHALMOLOGY
Payer: MEDICARE

## 2018-06-18 VITALS
DIASTOLIC BLOOD PRESSURE: 79 MMHG | RESPIRATION RATE: 16 BRPM | TEMPERATURE: 97.6 F | OXYGEN SATURATION: 94 % | HEART RATE: 78 BPM | SYSTOLIC BLOOD PRESSURE: 157 MMHG

## 2018-06-18 DIAGNOSIS — H25.11 AGE-RELATED NUCLEAR CATARACT OF RIGHT EYE: Primary | ICD-10-CM

## 2018-06-18 PROCEDURE — V2632 POST CHMBR INTRAOCULAR LENS: HCPCS | Performed by: OPHTHALMOLOGY

## 2018-06-18 DEVICE — ACRYSOF(R) IQ ASPHERIC NATURAL IOL, SINGLE-PIECE ACRYLIC FOLDABLE PCL, UV WITH BLUE LIGHTFILTER, 13.0MM LENGTH, 6.0MM ANTERIORASYMMETRIC BICONVEX OPTIC, PLANAR HAPTICS.
Type: IMPLANTABLE DEVICE | Site: EYE | Status: FUNCTIONAL
Brand: ACRYSOF®

## 2018-06-18 RX ORDER — KETOROLAC TROMETHAMINE 5 MG/ML
1 SOLUTION OPHTHALMIC
Status: COMPLETED | OUTPATIENT
Start: 2018-06-18 | End: 2018-06-18

## 2018-06-18 RX ORDER — CYCLOPENTOLATE HYDROCHLORIDE 10 MG/ML
1 SOLUTION/ DROPS OPHTHALMIC
Status: COMPLETED | OUTPATIENT
Start: 2018-06-18 | End: 2018-06-18

## 2018-06-18 RX ORDER — TETRACAINE HYDROCHLORIDE 5 MG/ML
SOLUTION OPHTHALMIC AS NEEDED
Status: DISCONTINUED | OUTPATIENT
Start: 2018-06-18 | End: 2018-06-18 | Stop reason: HOSPADM

## 2018-06-18 RX ORDER — FENTANYL CITRATE 50 UG/ML
INJECTION, SOLUTION INTRAMUSCULAR; INTRAVENOUS AS NEEDED
Status: DISCONTINUED | OUTPATIENT
Start: 2018-06-18 | End: 2018-06-18 | Stop reason: SURG

## 2018-06-18 RX ORDER — GATIFLOXACIN 5 MG/ML
1 SOLUTION/ DROPS OPHTHALMIC 2 TIMES DAILY
Qty: 3 ML | Refills: 0
Start: 2018-06-18 | End: 2019-08-14 | Stop reason: ALTCHOICE

## 2018-06-18 RX ORDER — MIDAZOLAM HYDROCHLORIDE 1 MG/ML
INJECTION INTRAMUSCULAR; INTRAVENOUS AS NEEDED
Status: DISCONTINUED | OUTPATIENT
Start: 2018-06-18 | End: 2018-06-18 | Stop reason: SURG

## 2018-06-18 RX ORDER — GATIFLOXACIN 5 MG/ML
SOLUTION/ DROPS OPHTHALMIC AS NEEDED
Status: DISCONTINUED | OUTPATIENT
Start: 2018-06-18 | End: 2018-06-18 | Stop reason: HOSPADM

## 2018-06-18 RX ORDER — PHENYLEPHRINE HCL 2.5 %
1 DROPS OPHTHALMIC (EYE)
Status: COMPLETED | OUTPATIENT
Start: 2018-06-18 | End: 2018-06-18

## 2018-06-18 RX ORDER — BALANCED SALT SOLUTION 6.4; .75; .48; .3; 3.9; 1.7 MG/ML; MG/ML; MG/ML; MG/ML; MG/ML; MG/ML
SOLUTION OPHTHALMIC AS NEEDED
Status: DISCONTINUED | OUTPATIENT
Start: 2018-06-18 | End: 2018-06-18 | Stop reason: HOSPADM

## 2018-06-18 RX ORDER — TETRACAINE HYDROCHLORIDE 5 MG/ML
1 SOLUTION OPHTHALMIC ONCE
Status: COMPLETED | OUTPATIENT
Start: 2018-06-18 | End: 2018-06-18

## 2018-06-18 RX ORDER — LIDOCAINE HYDROCHLORIDE 10 MG/ML
INJECTION, SOLUTION EPIDURAL; INFILTRATION; INTRACAUDAL; PERINEURAL AS NEEDED
Status: DISCONTINUED | OUTPATIENT
Start: 2018-06-18 | End: 2018-06-18 | Stop reason: HOSPADM

## 2018-06-18 RX ORDER — LIDOCAINE HYDROCHLORIDE 20 MG/ML
1 JELLY TOPICAL
Status: COMPLETED | OUTPATIENT
Start: 2018-06-18 | End: 2018-06-18

## 2018-06-18 RX ADMIN — CYCLOPENTOLATE HYDROCHLORIDE 1 DROP: 10 SOLUTION/ DROPS OPHTHALMIC at 10:15

## 2018-06-18 RX ADMIN — MIDAZOLAM HYDROCHLORIDE 1 MG: 1 INJECTION, SOLUTION INTRAMUSCULAR; INTRAVENOUS at 11:15

## 2018-06-18 RX ADMIN — PHENYLEPHRINE HYDROCHLORIDE 1 DROP: 25 SOLUTION/ DROPS OPHTHALMIC at 10:45

## 2018-06-18 RX ADMIN — PHENYLEPHRINE HYDROCHLORIDE 1 DROP: 25 SOLUTION/ DROPS OPHTHALMIC at 11:01

## 2018-06-18 RX ADMIN — LIDOCAINE HYDROCHLORIDE 1 APPLICATION: 20 JELLY TOPICAL at 10:45

## 2018-06-18 RX ADMIN — KETOROLAC TROMETHAMINE 1 DROP: 5 SOLUTION OPHTHALMIC at 11:01

## 2018-06-18 RX ADMIN — KETOROLAC TROMETHAMINE 1 DROP: 5 SOLUTION OPHTHALMIC at 10:45

## 2018-06-18 RX ADMIN — CYCLOPENTOLATE HYDROCHLORIDE 1 DROP: 10 SOLUTION/ DROPS OPHTHALMIC at 10:30

## 2018-06-18 RX ADMIN — LIDOCAINE HYDROCHLORIDE 1 APPLICATION: 20 JELLY TOPICAL at 10:15

## 2018-06-18 RX ADMIN — FENTANYL CITRATE 50 MCG: 50 INJECTION, SOLUTION INTRAMUSCULAR; INTRAVENOUS at 11:15

## 2018-06-18 RX ADMIN — KETOROLAC TROMETHAMINE 1 DROP: 5 SOLUTION OPHTHALMIC at 10:30

## 2018-06-18 RX ADMIN — KETOROLAC TROMETHAMINE 1 DROP: 5 SOLUTION OPHTHALMIC at 10:15

## 2018-06-18 RX ADMIN — CYCLOPENTOLATE HYDROCHLORIDE 1 DROP: 10 SOLUTION/ DROPS OPHTHALMIC at 10:45

## 2018-06-18 RX ADMIN — CYCLOPENTOLATE HYDROCHLORIDE 1 DROP: 10 SOLUTION/ DROPS OPHTHALMIC at 11:00

## 2018-06-18 RX ADMIN — PHENYLEPHRINE HYDROCHLORIDE 1 DROP: 25 SOLUTION/ DROPS OPHTHALMIC at 10:30

## 2018-06-18 RX ADMIN — TETRACAINE HYDROCHLORIDE 1 DROP: 5 SOLUTION OPHTHALMIC at 10:15

## 2018-06-18 RX ADMIN — PHENYLEPHRINE HYDROCHLORIDE 1 DROP: 25 SOLUTION/ DROPS OPHTHALMIC at 10:15

## 2018-06-18 RX ADMIN — LIDOCAINE HYDROCHLORIDE 1 APPLICATION: 20 JELLY TOPICAL at 10:30

## 2018-06-18 RX ADMIN — LIDOCAINE HYDROCHLORIDE 1 APPLICATION: 20 JELLY TOPICAL at 11:00

## 2018-06-18 NOTE — DISCHARGE INSTRUCTIONS
Dr Nerissa Vaughn Cataract Instructions    Activity:     1  No Driving until instructed   2  Keep shield on until seen tomorrow except when administering drops   3  No heavy lifting   4  No water in eye     Diet:     1  Resume normal diet    Normal Symptoms:     1  Mild Headache   2  Scratchy or picky feeling around eye    Call the office if:     1  You have any questions or concerns   2  If eye pain is not relieved by extra strength tylenol    Office phone number:  [phone]      Next appointment:     1  See Dr Nerissa Vaughn at his office tomorrow as scheduled   ____1015am______________________________________________________   2  Bring blue eye kit with you and eyedrops to the office    A new set of comprehensive instructions will be given and reviewed with you during your office visit tomorrow

## 2018-06-18 NOTE — ANESTHESIA PREPROCEDURE EVALUATION
Review of Systems/Medical History  Patient summary reviewed  Chart reviewed  No history of anesthetic complications     Cardiovascular  Hyperlipidemia, Hypertension , Valvular heart disease , aortic valve stenosis, mitral regurgitation and aortic insufficiency, Past MI , CAD , Cardiac stents (2013)  History of percutaneous transluminal coronary angioplasty,    Pulmonary       GI/Hepatic       Chronic kidney disease ,        Endo/Other     GYN       Hematology   Musculoskeletal    Comment: S/p joint replacement knee Arthritis     Neurology   Psychology           Physical Exam    Airway    Mallampati score: II  TM Distance: >3 FB  Neck ROM: full     Dental       Cardiovascular  Rhythm: regular, Rate: normal,     Pulmonary  Breath sounds clear to auscultation,     Other Findings        Anesthesia Plan  ASA Score- 3     Anesthesia Type- IV sedation with anesthesia with ASA Monitors  Additional Monitors:   Airway Plan:         Plan Factors-    Induction- intravenous  Postoperative Plan-     Informed Consent- Anesthetic plan and risks discussed with patient

## 2018-06-18 NOTE — OP NOTE
OPERATIVE REPORT    PATIENT NAME: Ofe Banks    :  1922  MRN: [id number]  Pt Location: [facility] OR ROOM 01    Surgery Date: 2018    Surgeon(s) and Role:     * Selena Sal MD - Primary    Age-related nuclear cataract of right eye [H25 11]    Post-Op Diagnosis Codes:     * Age-related nuclear cataract of right eye [H25 11]    Procedure(s):  EXTRACTION EXTRACAPSULAR CATARACT PHACO INTRAOCULAR LENS (IOL)    Anesthesia Type:   IV Sedation with Anesthesia    Operative Indications:  Age-related nuclear cataract of right eye [H25 11]  Decreased vision to 20/40  With problems reading and watching television  Pt requested cataract sx the right eye    Procedure and Technique:    Procedure Details     The patient was brought in the OR in stable condition and placed on the operative table  The right eye was prepped and draped in the usual sterile manner  Attention was directed to the right eye where a lid speculum was placed  A 2 4 mm clear corneal incision was made temperally  1/2 cc of 1% MPF Lidocaine was irrigated into the anterior chamber followed by viscoat  The pupil was found to be small and not safe to proceed  A Beehler Dilator was used to stretch the pupil and made it an adequate size to proceed without difficulty  The side port incision was placed superiorly  The capsularrhexis was made and the nucleus was hydrodissected with BSS  The nucleus was then removed with the phaco handpiece followed by removal of the cortical material with the I/A handpiece  The capsular bag was then filled with Provisc  The IOL was folded and placed in to the capsular bag and centered well  The remaining Provisc was removed from the eye with the I/A  The wounds were hydrated with BSS and found to be water tight  The lid speculum was removed and 2 drops of Gatifloxicin were placed over the cornea  A protective eye shield was taped over the eye and the patient went to PACU in stable condition   I will see the patient in the office tomorrow and the expected post op period is a few weeks         Complications: None        Disposition: PACU   Condition: Stable    SIGNATURE: Elio Lopez MD  DATE: June 18, 2018  TIME: 11:41 AM

## 2018-07-25 ENCOUNTER — OFFICE VISIT (OUTPATIENT)
Dept: CARDIOLOGY CLINIC | Facility: CLINIC | Age: 83
End: 2018-07-25
Payer: MEDICARE

## 2018-07-25 VITALS
WEIGHT: 149.6 LBS | BODY MASS INDEX: 27.53 KG/M2 | OXYGEN SATURATION: 96 % | DIASTOLIC BLOOD PRESSURE: 72 MMHG | HEART RATE: 72 BPM | SYSTOLIC BLOOD PRESSURE: 144 MMHG | HEIGHT: 62 IN

## 2018-07-25 DIAGNOSIS — Z98.61 CAD S/P PERCUTANEOUS CORONARY ANGIOPLASTY: ICD-10-CM

## 2018-07-25 DIAGNOSIS — I49.9 CARDIAC ARRHYTHMIA, UNSPECIFIED CARDIAC ARRHYTHMIA TYPE: ICD-10-CM

## 2018-07-25 DIAGNOSIS — I10 ESSENTIAL HYPERTENSION: Primary | ICD-10-CM

## 2018-07-25 DIAGNOSIS — I25.10 CAD S/P PERCUTANEOUS CORONARY ANGIOPLASTY: ICD-10-CM

## 2018-07-25 DIAGNOSIS — I08.0 MITRAL AND AORTIC REGURGITATION: ICD-10-CM

## 2018-07-25 DIAGNOSIS — I25.2 OLD INFERIOR WALL MYOCARDIAL INFARCTION: ICD-10-CM

## 2018-07-25 DIAGNOSIS — E78.5 DYSLIPIDEMIA: ICD-10-CM

## 2018-07-25 DIAGNOSIS — N18.30 CHRONIC KIDNEY DISEASE, STAGE III (MODERATE) (HCC): ICD-10-CM

## 2018-07-25 PROCEDURE — 99214 OFFICE O/P EST MOD 30 MIN: CPT | Performed by: INTERNAL MEDICINE

## 2018-07-25 PROCEDURE — 93000 ELECTROCARDIOGRAM COMPLETE: CPT | Performed by: INTERNAL MEDICINE

## 2018-07-25 RX ORDER — SIMVASTATIN 20 MG
20 TABLET ORAL
Qty: 30 TABLET | Refills: 5 | Status: SHIPPED | OUTPATIENT
Start: 2018-07-25 | End: 2019-07-12 | Stop reason: SDUPTHER

## 2018-07-25 NOTE — PROGRESS NOTES
Cardiology Progress Note    ASSESSMENT:       1  Improved uncontrolled systolic hypertension, stage II, with addition of amlodipine 5 mg daily about 6 months ago  2  Stable CAD with old inferior infarct and PCI of RCA 11/18/13 but new EKG changes of inferolateral myocardial infarction compared to 1 year ago  3  Murmurs of aortic valve stenosis and mitral regurgitation with known mild aortic valve stenosis and MAC/1+ MR as well as 2+ TR  on 04/03/2018 echocardiogram   4  Worsened CKD 3 with history of dehydration and YECENIA 3    5  Status post symptomatic bradycardia as metoprolol adverse drug reaction 8/15   6  History of resolved aerophagia and questionable hematuria   7  History of noncritical disease of LAD and LCx on 11/18/13 cardiac catheterization with normal nuclear stress test on 3/13/17  8  Worsened dyslipidemia   9  Osteoarthritis and questionable rheumatoid arthritis  10  Suppressed extrasystoles on today's examination  Plan       Patient Instructions     1  Continue present medications except for discontinuation of simvastatin 5 milligrams  2   New prescription was sent to her pharmacy for simvastatin, increased to 20 milligrams HS daily, with 30 day supply and 5 refills  3   Cardiology follow-up in 6 months with EKG, lipids, CMP  4   Increase hydration to help with renal function  HPI    This 80 y o  female  denies new cardiopulmonary and medical symptoms  She does complain of a low energy level, chronic stable exertional dyspnea, prolonged sitting most of the day, and a chronically diminished but stable appetite  She denies any recent palpitations, syncope, falls, chest pain, or dyspnea        Review of Systems    All other systems negative, except as noted in history of present illness    Historical Information   Past Medical History:   Diagnosis Date    Abnormal EKG     Aortic valve stenosis     Arthritis     CKD (chronic kidney disease), stage III     Coronary artery disease emergent PCI 11/18/13    Hypertension     Mitral and aortic regurgitation     Myocardial infarction [facility]) 11/2013    with 1 stent     Past Surgical History:   Procedure Laterality Date    APPENDECTOMY      BILATERAL OOPHORECTOMY      CORONARY STENT PLACEMENT  2013    after MI (x1 stent)    DILATION AND CURETTAGE OF UTERUS      JOINT REPLACEMENT Left     knee    [place] REMV CATARACT EXTRACAP,INSERT LENS Left 12/19/2016    Procedure: EXTRACTION EXTRACAPSULAR CATARACT PHACO INTRAOCULAR LENS (IOL); Surgeon: Garo Badillo MD;  Location: [facility] MAIN OR;  Service: Ophthalmology     [address] Right 6/18/2018    Procedure: EXTRACTION EXTRACAPSULAR CATARACT PHACO INTRAOCULAR LENS (IOL); Surgeon: Garo Badillo MD;  Location: [facility] MAIN OR;  Service: Ophthalmology    TONSILLECTOMY       History   Alcohol Use    Yes     Comment: drinks whiskey 2 x week     History   Drug Use No     History   Smoking Status    Never Smoker   Smokeless Tobacco    Never Used       Family History:  Family History   Problem Relation Age of Onset    Heart disease Sister         enlarged heart         Meds/Allergies     Prior to Admission medications    Medication Sig Start Date End Date Taking?  Authorizing Provider   amLODIPine (NORVASC) 5 mg tablet Take 1 tablet (5 mg total) by mouth daily for 30 days  Patient taking differently: Take 5 mg by mouth every evening   2/16/18 7/25/18 Yes Jessie Flores MD   amoxicillin (AMOXIL) 500 mg capsule Take 500 mg by mouth as needed   2/8/18  Yes Historical Provider, MD   aspirin 81 MG tablet Take 81 mg by mouth 2 (two) times a day   Yes Historical Provider, MD   losartan-hydrochlorothiazide (HYZAAR) 100-25 MG per tablet Take 1 tablet by mouth daily  Patient taking differently: Take 1 tablet by mouth every morning   5/22/18  Yes Jessie Flores MD   Multiple Vitamins-Minerals (PRESERVISION AREDS) CAPS Take by mouth 7/25/17  Yes Historical Provider, MD   simvastatin (ZOCOR) 5 MG tablet Take 1 tablet (5 mg total) by mouth daily at bedtime 5/15/18 7/25/18 Yes Francois Guallpa MD   Bromfenac Sodium (BROMSITE) 0 075 % SOLN Administer 1 drop to the right eye 2 (two) times a day 6/18/18   Elio Lopez MD   gatifloxacin (ZYMAXID) 0 5 % Administer 1 drop to the right eye 2 (two) times a day 6/18/18   Elio Lopez MD   simvastatin (ZOCOR) 20 mg tablet Take 1 tablet (20 mg total) by mouth daily at bedtime 7/25/18   Francois Guallpa MD       Allergies   Allergen Reactions    Metoprolol Other (See Comments)     According to previous medical record, you developed bradycardia and pre-syncope in 2015 while on metoprolol ER 25mg, so please DO NOT take metoprolol or other beta blocker  Vitals:    07/25/18 1124   BP: 144/72   BP Location: Left arm   Patient Position: Sitting   Cuff Size: Standard   Pulse: 72   SpO2: 96%   Weight: 67 9 kg (149 lb 9 6 oz)   Height: 5' 1 75" (1 568 m)       Body mass index is 27 58 kg/m²    1 6 pound weight gain in 5+ months    Physical Exam:    General Appearance:  Alert, cooperative, no distress, appears stated age   Head:  Normocephalic, without obvious abnormality, atraumatic   Eyes:  PERRL, conjunctiva/corneas clear, EOM's intact,   both eyes   Ears:  Normal TM's and external ear canals, both ears   Nose: Nares normal, septum midline, mucosa normal, no drainage or sinus tenderness   Throat: Lips, mucosa, and tongue normal; teeth and gums normal   Neck: Supple, symmetrical, trachea midline, no adenopathy, thyroid: not enlarged, symmetric, no tenderness/mass/nodules, no carotid bruit or JVD   Back:   Symmetric, no curvature, ROM normal, no CVA tenderness   Lungs:   Clear to auscultation bilaterally, respirations unlabored   Chest Wall:  No tenderness or deformity   Heart:  Regular rate and rhythm, S1, S2 normal, grade 2/6 aortic systolic ejection murmur, radiating to right more than left sternal border and right neck with intact A2 and no rub or gallop Abdomen:   Soft, non-tender, bowel sounds active all four quadrants,  no masses, no organomegaly   Extremities: Extremities normal, atraumatic, no cyanosis with trace right more than left ankle edema   Pulses: 1+ and symmetric   Skin: Skin showed normal color, texture, turgor and no rashes or lesions   Lymph nodes: Cervical, supraclavicular, and axillary nodes normal   Neurologic: Normal         Cardiographics    ECG 07/25/2018:    New inferior T inversion and new or deeper inferolateral Q-waves compared to 07/25/2017  Poor R-wave progression  No other changes since 07/25/2017    Imaging    Chest X-Ray :  No Chest XR results available for this patient            Lab Review       Lab Results   Component Value Date     05/30/2018    K 3 9 05/30/2018     05/30/2018    CO2 24 05/30/2018    ANIONGAP 9 05/30/2018    BUN 23 05/30/2018    CREATININE 1 24 05/30/2018    GLUCOSE 87 04/17/2017    GLUF 96 05/30/2018    CALCIUM 8 8 05/30/2018    AST 16 05/30/2018    ALT 20 05/30/2018    ALKPHOS 74 05/30/2018    PROT 6 2 (L) 05/30/2018    BILITOT 0 36 05/30/2018    EGFR 37 05/30/2018       Lab Results   Component Value Date    CHOL 194 05/30/2018    CHOL 160 07/21/2017    CHOL 150 01/09/2017     Lab Results   Component Value Date    HDL 46 05/30/2018    HDL 51 07/21/2017    HDL 59 01/09/2017     Lab Results   Component Value Date    LDLCALC 110 (H) 05/30/2018    LDLCALC 78 07/21/2017    LDLCALC 70 01/09/2017     Lab Results   Component Value Date    TRIG 191 (H) 05/30/2018    TRIG 153 (H) 07/21/2017    TRIG 105 01/09/2017     No components found for: CHOLHDL    Lab Results   Component Value Date    GLUCOSE 87 04/17/2017    CALCIUM 8 8 05/30/2018     05/30/2018    K 3 9 05/30/2018    CO2 24 05/30/2018     05/30/2018    BUN 23 05/30/2018    CREATININE 1 24 05/30/2018           Simón Herrera MD

## 2018-07-25 NOTE — PATIENT INSTRUCTIONS
1   Continue present medications except for discontinuation of simvastatin 5 milligrams  2   New prescription was sent to her pharmacy for simvastatin, increased to 20 milligrams HS daily with 30 day supply and 5 refills  3   Cardiology follow-up in 6 months with EKG, lipids, CMP

## 2019-01-23 ENCOUNTER — APPOINTMENT (OUTPATIENT)
Dept: LAB | Facility: CLINIC | Age: 84
End: 2019-01-23
Payer: COMMERCIAL

## 2019-01-23 ENCOUNTER — TRANSCRIBE ORDERS (OUTPATIENT)
Dept: ADMINISTRATIVE | Facility: HOSPITAL | Age: 84
End: 2019-01-23

## 2019-01-23 DIAGNOSIS — E78.5 DYSLIPIDEMIA: ICD-10-CM

## 2019-01-23 DIAGNOSIS — I10 ESSENTIAL HYPERTENSION: ICD-10-CM

## 2019-01-23 LAB
ALBUMIN SERPL BCP-MCNC: 3 G/DL (ref 3.5–5)
ALP SERPL-CCNC: 83 U/L (ref 46–116)
ALT SERPL W P-5'-P-CCNC: 14 U/L (ref 12–78)
ANION GAP SERPL CALCULATED.3IONS-SCNC: 6 MMOL/L (ref 4–13)
AST SERPL W P-5'-P-CCNC: 10 U/L (ref 5–45)
BILIRUB SERPL-MCNC: 0.32 MG/DL (ref 0.2–1)
BUN SERPL-MCNC: 33 MG/DL (ref 5–25)
CALCIUM SERPL-MCNC: 9.4 MG/DL (ref 8.3–10.1)
CHLORIDE SERPL-SCNC: 108 MMOL/L (ref 100–108)
CHOLEST SERPL-MCNC: 151 MG/DL (ref 50–200)
CO2 SERPL-SCNC: 26 MMOL/L (ref 21–32)
CREAT SERPL-MCNC: 1.23 MG/DL (ref 0.6–1.3)
GFR SERPL CREATININE-BSD FRML MDRD: 37 ML/MIN/1.73SQ M
GLUCOSE P FAST SERPL-MCNC: 102 MG/DL (ref 65–99)
HDLC SERPL-MCNC: 59 MG/DL (ref 40–60)
LDLC SERPL CALC-MCNC: 73 MG/DL (ref 0–100)
NONHDLC SERPL-MCNC: 92 MG/DL
POTASSIUM SERPL-SCNC: 3.7 MMOL/L (ref 3.5–5.3)
PROT SERPL-MCNC: 7.2 G/DL (ref 6.4–8.2)
SODIUM SERPL-SCNC: 140 MMOL/L (ref 136–145)
TRIGL SERPL-MCNC: 94 MG/DL

## 2019-01-23 PROCEDURE — 36415 COLL VENOUS BLD VENIPUNCTURE: CPT

## 2019-01-23 PROCEDURE — 80053 COMPREHEN METABOLIC PANEL: CPT

## 2019-01-23 PROCEDURE — 80061 LIPID PANEL: CPT

## 2019-01-31 ENCOUNTER — ANESTHESIA (EMERGENCY)
Dept: GASTROENTEROLOGY | Facility: AMBULARY SURGERY CENTER | Age: 84
DRG: 177 | End: 2019-01-31
Payer: COMMERCIAL

## 2019-01-31 ENCOUNTER — APPOINTMENT (EMERGENCY)
Dept: RADIOLOGY | Facility: HOSPITAL | Age: 84
DRG: 177 | End: 2019-01-31
Payer: COMMERCIAL

## 2019-01-31 ENCOUNTER — HOSPITAL ENCOUNTER (INPATIENT)
Facility: HOSPITAL | Age: 84
LOS: 1 days | Discharge: HOME | DRG: 177 | End: 2019-02-03
Attending: EMERGENCY MEDICINE | Admitting: INTERNAL MEDICINE
Payer: COMMERCIAL

## 2019-01-31 DIAGNOSIS — K22.2 ACUTE ESOPHAGEAL OBSTRUCTION: Primary | ICD-10-CM

## 2019-01-31 DIAGNOSIS — T18.128A FOOD IMPACTION OF ESOPHAGUS, INITIAL ENCOUNTER: ICD-10-CM

## 2019-01-31 DIAGNOSIS — J69.0 ASPIRATION PNEUMONIA DUE TO REGURGITATED FOOD, UNSPECIFIED LATERALITY, UNSPECIFIED PART OF LUNG (HCC): ICD-10-CM

## 2019-01-31 LAB
ALBUMIN SERPL BCP-MCNC: 3.2 G/DL (ref 3.5–5)
ALP SERPL-CCNC: 88 U/L (ref 46–116)
ALT SERPL W P-5'-P-CCNC: 14 U/L (ref 12–78)
ANION GAP SERPL CALCULATED.3IONS-SCNC: 12 MMOL/L (ref 4–13)
APTT PPP: 29 SECONDS (ref 24–33)
AST SERPL W P-5'-P-CCNC: 9 U/L (ref 5–45)
BASOPHILS # BLD AUTO: 0.07 THOUSANDS/ΜL (ref 0–0.1)
BASOPHILS NFR BLD AUTO: 1 % (ref 0–1)
BILIRUB SERPL-MCNC: 0.3 MG/DL (ref 0.2–1)
BUN SERPL-MCNC: 25 MG/DL (ref 5–25)
CALCIUM SERPL-MCNC: 9.6 MG/DL (ref 8.3–10.1)
CHLORIDE SERPL-SCNC: 104 MMOL/L (ref 100–108)
CO2 SERPL-SCNC: 25 MMOL/L (ref 21–32)
CREAT SERPL-MCNC: 1.1 MG/DL (ref 0.6–1.3)
EOSINOPHIL # BLD AUTO: 0.1 THOUSAND/ΜL (ref 0–0.61)
EOSINOPHIL NFR BLD AUTO: 1 % (ref 0–6)
ERYTHROCYTE [DISTWIDTH] IN BLOOD BY AUTOMATED COUNT: 12.7 % (ref 11.6–15.1)
GFR SERPL CREATININE-BSD FRML MDRD: 42 ML/MIN/1.73SQ M
GLUCOSE SERPL-MCNC: 144 MG/DL (ref 65–140)
HCT VFR BLD AUTO: 37.3 % (ref 34.8–46.1)
HGB BLD-MCNC: 11.6 G/DL (ref 11.5–15.4)
IMM GRANULOCYTES # BLD AUTO: 0.12 THOUSAND/UL (ref 0–0.2)
IMM GRANULOCYTES NFR BLD AUTO: 1 % (ref 0–2)
INR PPP: 0.99 (ref 0.86–1.16)
LYMPHOCYTES # BLD AUTO: 1.68 THOUSANDS/ΜL (ref 0.6–4.47)
LYMPHOCYTES NFR BLD AUTO: 11 % (ref 14–44)
MCH RBC QN AUTO: 31 PG (ref 26.8–34.3)
MCHC RBC AUTO-ENTMCNC: 31.1 G/DL (ref 31.4–37.4)
MCV RBC AUTO: 100 FL (ref 82–98)
MONOCYTES # BLD AUTO: 1.57 THOUSAND/ΜL (ref 0.17–1.22)
MONOCYTES NFR BLD AUTO: 11 % (ref 4–12)
NEUTROPHILS # BLD AUTO: 11.27 THOUSANDS/ΜL (ref 1.85–7.62)
NEUTS SEG NFR BLD AUTO: 75 % (ref 43–75)
NRBC BLD AUTO-RTO: 0 /100 WBCS
PLATELET # BLD AUTO: 236 THOUSANDS/UL (ref 149–390)
PMV BLD AUTO: 10.4 FL (ref 8.9–12.7)
POTASSIUM SERPL-SCNC: 3.8 MMOL/L (ref 3.5–5.3)
PROT SERPL-MCNC: 7.1 G/DL (ref 6.4–8.2)
PROTHROMBIN TIME: 10.4 SECONDS (ref 9.4–11.7)
RBC # BLD AUTO: 3.74 MILLION/UL (ref 3.81–5.12)
SODIUM SERPL-SCNC: 141 MMOL/L (ref 136–145)
TROPONIN I SERPL-MCNC: <0.02 NG/ML
WBC # BLD AUTO: 14.81 THOUSAND/UL (ref 4.31–10.16)

## 2019-01-31 PROCEDURE — 99204 OFFICE O/P NEW MOD 45 MIN: CPT | Performed by: INTERNAL MEDICINE

## 2019-01-31 PROCEDURE — 0DC58ZZ EXTIRPATION OF MATTER FROM ESOPHAGUS, VIA NATURAL OR ARTIFICIAL OPENING ENDOSCOPIC: ICD-10-PCS | Performed by: INTERNAL MEDICINE

## 2019-01-31 PROCEDURE — 71045 X-RAY EXAM CHEST 1 VIEW: CPT

## 2019-01-31 PROCEDURE — 96374 THER/PROPH/DIAG INJ IV PUSH: CPT

## 2019-01-31 PROCEDURE — 94640 AIRWAY INHALATION TREATMENT: CPT

## 2019-01-31 PROCEDURE — 93005 ELECTROCARDIOGRAM TRACING: CPT

## 2019-01-31 PROCEDURE — 85025 COMPLETE CBC W/AUTO DIFF WBC: CPT | Performed by: EMERGENCY MEDICINE

## 2019-01-31 PROCEDURE — 99285 EMERGENCY DEPT VISIT HI MDM: CPT

## 2019-01-31 PROCEDURE — 85610 PROTHROMBIN TIME: CPT | Performed by: EMERGENCY MEDICINE

## 2019-01-31 PROCEDURE — 80053 COMPREHEN METABOLIC PANEL: CPT | Performed by: EMERGENCY MEDICINE

## 2019-01-31 PROCEDURE — 36415 COLL VENOUS BLD VENIPUNCTURE: CPT | Performed by: EMERGENCY MEDICINE

## 2019-01-31 PROCEDURE — 85730 THROMBOPLASTIN TIME PARTIAL: CPT | Performed by: EMERGENCY MEDICINE

## 2019-01-31 PROCEDURE — 84484 ASSAY OF TROPONIN QUANT: CPT | Performed by: EMERGENCY MEDICINE

## 2019-01-31 PROCEDURE — 43247 EGD REMOVE FOREIGN BODY: CPT | Performed by: INTERNAL MEDICINE

## 2019-01-31 RX ORDER — DIPHENHYDRAMINE HYDROCHLORIDE 50 MG/ML
12.5 INJECTION INTRAMUSCULAR; INTRAVENOUS ONCE AS NEEDED
Status: DISCONTINUED | OUTPATIENT
Start: 2019-01-31 | End: 2019-02-03 | Stop reason: HOSPADM

## 2019-01-31 RX ORDER — ONDANSETRON 2 MG/ML
INJECTION INTRAMUSCULAR; INTRAVENOUS AS NEEDED
Status: DISCONTINUED | OUTPATIENT
Start: 2019-01-31 | End: 2019-01-31 | Stop reason: SURG

## 2019-01-31 RX ORDER — SUCCINYLCHOLINE CHLORIDE 20 MG/ML
INJECTION INTRAMUSCULAR; INTRAVENOUS AS NEEDED
Status: DISCONTINUED | OUTPATIENT
Start: 2019-01-31 | End: 2019-01-31 | Stop reason: SURG

## 2019-01-31 RX ORDER — OMEPRAZOLE 40 MG/1
40 CAPSULE, DELAYED RELEASE ORAL DAILY
Qty: 30 CAPSULE | Refills: 5 | Status: SHIPPED | OUTPATIENT
Start: 2019-01-31 | End: 2021-01-01 | Stop reason: HOSPADM

## 2019-01-31 RX ORDER — IPRATROPIUM BROMIDE AND ALBUTEROL SULFATE 2.5; .5 MG/3ML; MG/3ML
3 SOLUTION RESPIRATORY (INHALATION) ONCE
Status: COMPLETED | OUTPATIENT
Start: 2019-01-31 | End: 2019-01-31

## 2019-01-31 RX ORDER — PROPOFOL 10 MG/ML
INJECTION, EMULSION INTRAVENOUS AS NEEDED
Status: DISCONTINUED | OUTPATIENT
Start: 2019-01-31 | End: 2019-01-31 | Stop reason: SURG

## 2019-01-31 RX ORDER — ONDANSETRON 2 MG/ML
4 INJECTION INTRAMUSCULAR; INTRAVENOUS ONCE AS NEEDED
Status: DISCONTINUED | OUTPATIENT
Start: 2019-01-31 | End: 2019-02-03 | Stop reason: HOSPADM

## 2019-01-31 RX ADMIN — IPRATROPIUM BROMIDE AND ALBUTEROL SULFATE 3 ML: 2.5; .5 SOLUTION RESPIRATORY (INHALATION) at 19:38

## 2019-01-31 RX ADMIN — PROPOFOL 80 MG: 10 INJECTION, EMULSION INTRAVENOUS at 21:47

## 2019-01-31 RX ADMIN — ONDANSETRON 4 MG: 2 INJECTION INTRAMUSCULAR; INTRAVENOUS at 21:58

## 2019-01-31 RX ADMIN — GLUCAGON HYDROCHLORIDE 1 MG: KIT at 19:40

## 2019-01-31 RX ADMIN — SUCCINYLCHOLINE CHLORIDE 90 MG: 20 INJECTION, SOLUTION INTRAMUSCULAR; INTRAVENOUS at 21:47

## 2019-02-01 PROBLEM — R09.02 HYPOXIA: Status: ACTIVE | Noted: 2019-02-01

## 2019-02-01 PROBLEM — J69.0 ASPIRATION PNEUMONIA (HCC): Status: ACTIVE | Noted: 2019-02-01

## 2019-02-01 LAB
ANION GAP SERPL CALCULATED.3IONS-SCNC: 10 MMOL/L (ref 4–13)
BUN SERPL-MCNC: 23 MG/DL (ref 5–25)
CALCIUM SERPL-MCNC: 8.3 MG/DL (ref 8.3–10.1)
CHLORIDE SERPL-SCNC: 109 MMOL/L (ref 100–108)
CO2 SERPL-SCNC: 25 MMOL/L (ref 21–32)
CREAT SERPL-MCNC: 1.03 MG/DL (ref 0.6–1.3)
ERYTHROCYTE [DISTWIDTH] IN BLOOD BY AUTOMATED COUNT: 12.8 % (ref 11.6–15.1)
GFR SERPL CREATININE-BSD FRML MDRD: 46 ML/MIN/1.73SQ M
GLUCOSE P FAST SERPL-MCNC: 85 MG/DL (ref 65–99)
GLUCOSE SERPL-MCNC: 85 MG/DL (ref 65–140)
HCT VFR BLD AUTO: 29.3 % (ref 34.8–46.1)
HGB BLD-MCNC: 9 G/DL (ref 11.5–15.4)
L PNEUMO1 AG UR QL IA.RAPID: NEGATIVE
MCH RBC QN AUTO: 30.8 PG (ref 26.8–34.3)
MCHC RBC AUTO-ENTMCNC: 30.7 G/DL (ref 31.4–37.4)
MCV RBC AUTO: 100 FL (ref 82–98)
PLATELET # BLD AUTO: 181 THOUSANDS/UL (ref 149–390)
PMV BLD AUTO: 10.6 FL (ref 8.9–12.7)
POTASSIUM SERPL-SCNC: 3.6 MMOL/L (ref 3.5–5.3)
PROCALCITONIN SERPL-MCNC: 0.35 NG/ML
RBC # BLD AUTO: 2.92 MILLION/UL (ref 3.81–5.12)
S PNEUM AG UR QL: NEGATIVE
SODIUM SERPL-SCNC: 144 MMOL/L (ref 136–145)
WBC # BLD AUTO: 29.39 THOUSAND/UL (ref 4.31–10.16)

## 2019-02-01 PROCEDURE — 99225 PR SBSQ OBSERVATION CARE/DAY 25 MINUTES: CPT | Performed by: INTERNAL MEDICINE

## 2019-02-01 PROCEDURE — 97530 THERAPEUTIC ACTIVITIES: CPT

## 2019-02-01 PROCEDURE — G8997 SWALLOW GOAL STATUS: HCPCS

## 2019-02-01 PROCEDURE — G8978 MOBILITY CURRENT STATUS: HCPCS

## 2019-02-01 PROCEDURE — G8979 MOBILITY GOAL STATUS: HCPCS

## 2019-02-01 PROCEDURE — 84145 PROCALCITONIN (PCT): CPT | Performed by: NURSE PRACTITIONER

## 2019-02-01 PROCEDURE — 99214 OFFICE O/P EST MOD 30 MIN: CPT | Performed by: INTERNAL MEDICINE

## 2019-02-01 PROCEDURE — 92610 EVALUATE SWALLOWING FUNCTION: CPT

## 2019-02-01 PROCEDURE — 87081 CULTURE SCREEN ONLY: CPT | Performed by: NURSE PRACTITIONER

## 2019-02-01 PROCEDURE — 87449 NOS EACH ORGANISM AG IA: CPT | Performed by: NURSE PRACTITIONER

## 2019-02-01 PROCEDURE — 87040 BLOOD CULTURE FOR BACTERIA: CPT | Performed by: NURSE PRACTITIONER

## 2019-02-01 PROCEDURE — G8996 SWALLOW CURRENT STATUS: HCPCS

## 2019-02-01 PROCEDURE — 80048 BASIC METABOLIC PNL TOTAL CA: CPT | Performed by: NURSE PRACTITIONER

## 2019-02-01 PROCEDURE — 85027 COMPLETE CBC AUTOMATED: CPT | Performed by: NURSE PRACTITIONER

## 2019-02-01 PROCEDURE — 97163 PT EVAL HIGH COMPLEX 45 MIN: CPT

## 2019-02-01 RX ORDER — PANTOPRAZOLE SODIUM 40 MG/1
40 TABLET, DELAYED RELEASE ORAL
Status: DISCONTINUED | OUTPATIENT
Start: 2019-02-01 | End: 2019-02-03 | Stop reason: HOSPADM

## 2019-02-01 RX ORDER — CEFTRIAXONE 1 G/50ML
1000 INJECTION, SOLUTION INTRAVENOUS EVERY 24 HOURS
Status: DISCONTINUED | OUTPATIENT
Start: 2019-02-01 | End: 2019-02-01

## 2019-02-01 RX ORDER — ACETAMINOPHEN 325 MG/1
650 TABLET ORAL EVERY 6 HOURS PRN
Status: DISCONTINUED | OUTPATIENT
Start: 2019-02-01 | End: 2019-02-03 | Stop reason: HOSPADM

## 2019-02-01 RX ORDER — AMLODIPINE BESYLATE 5 MG/1
5 TABLET ORAL EVERY EVENING
Status: DISCONTINUED | OUTPATIENT
Start: 2019-02-01 | End: 2019-02-03 | Stop reason: HOSPADM

## 2019-02-01 RX ORDER — SODIUM CHLORIDE 9 MG/ML
50 INJECTION, SOLUTION INTRAVENOUS CONTINUOUS
Status: DISCONTINUED | OUTPATIENT
Start: 2019-02-01 | End: 2019-02-01

## 2019-02-01 RX ORDER — LOSARTAN POTASSIUM 25 MG/1
25 TABLET ORAL DAILY
Status: DISCONTINUED | OUTPATIENT
Start: 2019-02-01 | End: 2019-02-03 | Stop reason: HOSPADM

## 2019-02-01 RX ADMIN — METRONIDAZOLE 500 MG: 500 INJECTION, SOLUTION INTRAVENOUS at 10:15

## 2019-02-01 RX ADMIN — METRONIDAZOLE 500 MG: 500 INJECTION, SOLUTION INTRAVENOUS at 01:33

## 2019-02-01 RX ADMIN — ENOXAPARIN SODIUM 40 MG: 40 INJECTION SUBCUTANEOUS at 10:14

## 2019-02-01 RX ADMIN — PIPERACILLIN SODIUM,TAZOBACTAM SODIUM 2.25 G: 2; .25 INJECTION, POWDER, FOR SOLUTION INTRAVENOUS at 15:09

## 2019-02-01 RX ADMIN — PANTOPRAZOLE SODIUM 40 MG: 40 TABLET, DELAYED RELEASE ORAL at 10:15

## 2019-02-01 RX ADMIN — CEFTRIAXONE 1000 MG: 1 INJECTION, SOLUTION INTRAVENOUS at 02:20

## 2019-02-01 RX ADMIN — LOSARTAN POTASSIUM 25 MG: 25 TABLET, FILM COATED ORAL at 12:59

## 2019-02-01 RX ADMIN — PIPERACILLIN SODIUM,TAZOBACTAM SODIUM 2.25 G: 2; .25 INJECTION, POWDER, FOR SOLUTION INTRAVENOUS at 20:36

## 2019-02-01 RX ADMIN — SODIUM CHLORIDE 50 ML/HR: 0.9 INJECTION, SOLUTION INTRAVENOUS at 01:23

## 2019-02-01 RX ADMIN — AMLODIPINE BESYLATE 5 MG: 5 TABLET ORAL at 17:23

## 2019-02-01 NOTE — SOCIAL WORK
Met with pt  Resides with her  in a single floor home with 7 steps to enter with rail  Uses cane for ambulation   still drives, but daughter also provides transportation   is POA  +LW  Explained role of cm, no anticipated d/c needs  CM reviewed d/c planning process including the following: identifying help at home, patient preference for d/c planning needs, and availability of the treatment team to discuss questions or concerns patient and/or family may have regarding understanding of medications and recognizing signs and symptoms once discharged  CM also encouraged patient to follow up with all recommended appointments after discharge  Patient advised of importance for patient and family to participate in managing patient's medical well being

## 2019-02-01 NOTE — PROGRESS NOTES
Progress Note - Lisbet Mathur 80 y o  female MRN: [id number]    Unit/Bed#: [address] [place] [address] Encounter: [id number]        Subjective:   Patient reports feeling relatively well, denies any shortness of breath  Has been NPO this morning, pending speech evaluation  Objective:     Vitals: Blood pressure 117/55, pulse 83, temperature 99 1 °F (37 3 °C), temperature source Oral, resp  rate 18, weight 71 kg (156 lb 8 4 oz), SpO2 98 %  ,Body mass index is 28 86 kg/m²  No intake or output data in the 24 hours ending 02/01/19 1112    Physical Exam:   General appearance: alert, appears stated age and cooperative  Lungs: clear to auscultation bilaterally, no labored breathing/accessory muscle use  Heart: regular rate and rhythm, S1, S2 normal, no murmur, click, rub or gallop  Abdomen: soft, non-tender; bowel sounds normal; no masses,  no organomegaly  Extremities: no edema    Invasive Devices     Peripheral Intravenous Line            Peripheral IV 01/31/19 Left Hand less than 1 day                Lab, Imaging and other studies: I have personally reviewed pertinent reports      Admission on 01/31/2019   Component Date Value    WBC 01/31/2019 14 81*    RBC 01/31/2019 3 74*    Hemoglobin 01/31/2019 11 6     Hematocrit 01/31/2019 37 3     MCV 01/31/2019 100*    MCH 01/31/2019 31 0     MCHC 01/31/2019 31 1*    RDW 01/31/2019 12 7     MPV 01/31/2019 10 4     Platelets 80/10/5621 236     nRBC 01/31/2019 0     Neutrophils Relative 01/31/2019 75     Immat GRANS % 01/31/2019 1     Lymphocytes Relative 01/31/2019 11*    Monocytes Relative 01/31/2019 11     Eosinophils Relative 01/31/2019 1     Basophils Relative 01/31/2019 1     Neutrophils Absolute 01/31/2019 11 27*    Immature Grans Absolute 01/31/2019 0 12     Lymphocytes Absolute 01/31/2019 1 68     Monocytes Absolute 01/31/2019 1 57*    Eosinophils Absolute 01/31/2019 0 10     Basophils Absolute 01/31/2019 0 07     Protime 01/31/2019 10 4     INR 01/31/2019 0 99     PTT 01/31/2019 29     Sodium 01/31/2019 141     Potassium 01/31/2019 3 8     Chloride 01/31/2019 104     CO2 01/31/2019 25     ANION GAP 01/31/2019 12     BUN 01/31/2019 25     Creatinine 01/31/2019 1 10     Glucose 01/31/2019 144*    Calcium 01/31/2019 9 6     AST 01/31/2019 9     ALT 01/31/2019 14     Alkaline Phosphatase 01/31/2019 88     Total Protein 01/31/2019 7 1     Albumin 01/31/2019 3 2*    Total Bilirubin 01/31/2019 0 30     eGFR 01/31/2019 42     Troponin I 01/31/2019 <0 02     Procalcitonin 02/01/2019 0 35*    Strep pneumoniae antigen* 02/01/2019 Negative     Legionella Urinary Antig* 02/01/2019 Negative     Sodium 02/01/2019 144     Potassium 02/01/2019 3 6     Chloride 02/01/2019 109*    CO2 02/01/2019 25     ANION GAP 02/01/2019 10     BUN 02/01/2019 23     Creatinine 02/01/2019 1 03     Glucose 02/01/2019 85     Glucose, Fasting 02/01/2019 85     Calcium 02/01/2019 8 3     eGFR 02/01/2019 46     WBC 02/01/2019 29 39*    RBC 02/01/2019 2 92*    Hemoglobin 02/01/2019 9 0*    Hematocrit 02/01/2019 29 3*    MCV 02/01/2019 100*    MCH 02/01/2019 30 8     MCHC 02/01/2019 30 7*    RDW 02/01/2019 12 8     Platelets 71/49/4225 181     MPV 02/01/2019 10 6      Results for Zoila Diaz (MRN [id number]) as of 2/1/2019 11:12   Ref   Range 2/1/2019 00:27 2/1/2019 01:05 2/1/2019 01:13 2/1/2019 01:14 2/1/2019 01:20 2/1/2019 06:08   eGFR Latest Units: ml/min/1 73sq m      46   Sodium Latest Ref Range: 136 - 145 mmol/L      144   Potassium Latest Ref Range: 3 5 - 5 3 mmol/L      3 6   Chloride Latest Ref Range: 100 - 108 mmol/L      109 (H)   CO2 Latest Ref Range: 21 - 32 mmol/L      25   Anion Gap Latest Ref Range: 4 - 13 mmol/L      10   BUN Latest Ref Range: 5 - 25 mg/dL      23   Creatinine Latest Ref Range: 0 60 - 1 30 mg/dL      1 03   Glucose, Random Latest Ref Range: 65 - 140 mg/dL      85   GLUCOSE FASTING Latest Ref Range: 65 - 99 mg/dL      85   Calcium Latest Ref Range: 8 3 - 10 1 mg/dL      8 3   WBC Latest Ref Range: 4 31 - 10 16 Thousand/uL      29 39 (H)   Red Blood Cell Count Latest Ref Range: 3 81 - 5 12 Million/uL      2 92 (L)   Hemoglobin Latest Ref Range: 11 5 - 15 4 g/dL      9 0 (L)   HCT Latest Ref Range: 34 8 - 46 1 %      29 3 (L)   MCV Latest Ref Range: 82 - 98 fL      100 (H)   MCH Latest Ref Range: 26 8 - 34 3 pg      30 8   MCHC Latest Ref Range: 31 4 - 37 4 g/dL      30 7 (L)   RDW Latest Ref Range: 11 6 - 15 1 %      12 8   Platelet Count Latest Ref Range: 149 - 390 Thousands/uL      181   MPV Latest Ref Range: 8 9 - 12 7 fL      10 6   Legionella Urinary Antigen Latest Ref Range: Negative   Negative       BLOOD CULTURE Unknown    Rpt ((NONE)) Rpt ((NONE))    MRSA CULTURE Unknown Rpt ((NONE))        Strep pneumoniae antigen, urine Latest Ref Range: Negative   Negative       LEGIONELLA ANTIGEN, URINE Unknown  Rpt       Procalcitonin Latest Ref Range: <=0 25 ng/ml   0 35 (H)      STREP PNEUMONIAE ANTIGEN,URINE Unknown  Rpt           Assessment/Plan:    1  Esophageal food impaction status post EGD last night with disimpaction  There is noted to be significant amount of fluid and solid food degree throughout the esophagus, which was suctioned, removed, and/or pushed into the stomach    No discrete stricture or mass was identified     - outpatient EGD in 2 months  - daily PPI therapy in the meantime  - bedside speech evaluation prior to initiating diet, out of concern for potential aspiration  - continue management for possible aspiration pneumonia

## 2019-02-01 NOTE — ANESTHESIA PREPROCEDURE EVALUATION
Review of Systems/Medical History  Patient summary reviewed        Cardiovascular  Hypertension , Valvular heart disease , aortic valve stenosis, Past MI , CAD ,   Comment: Transthoracic Echocardiogram  2D, M-mode, Doppler, and Color Doppler     Study date:  2018     Patient: Ron Chaudhary  MR number: [id number]  Account number: [de-identified]  : 29-Dec-1922  Age: 89 years  Gender: Female  Status: Routine  Location: Echo lab  Height: 65 in  Weight: 147 6 lb  BP: 118/ 62 mmHg     Indications: CAD     Diagnoses: I25 83 - Coronary atherosclerosis due to lipid rich plaque     Sonographer:  CAITLYN Kasper  Primary Physician:  Aylin Ying DO  Referring Physician:  Ryan Anthony MD  Group:  [facility]  Interpreting Physician:  Anival Diaz DO     SUMMARY     LEFT VENTRICLE:  Systolic function was normal by visual assessment  Ejection fraction was estimated to be 55 %  There were no regional wall motion abnormalities  There was mild concentric hypertrophy  Doppler parameters were consistent with abnormal left ventricular relaxation (grade 1 diastolic dysfunction)      MITRAL VALVE:  There was mild regurgitation      AORTIC VALVE:  There was mild stenosis  There was no regurgitation  Valve mean gradient was 14 mmHg  Aortic valve area was 1 6 cm squared by the continuity equation      TRICUSPID VALVE:  There was moderate regurgitation  Pulmonary artery systolic pressure was within the normal range      HISTORY: PRIOR HISTORY: HTN, CAD, CKD, Dyslipidemia, Arthritis     PROCEDURE: The procedure was performed in the echo lab  This was a routine study  The transthoracic approach was used  The study included complete 2D imaging, M-mode, complete spectral Doppler, and color Doppler  The heart rate was 80 bpm,  at the start of the study  Image quality was adequate      LEFT VENTRICLE: Size was normal  Systolic function was normal by visual assessment   Ejection fraction was estimated to be 55 %  There were no regional wall motion abnormalities  There was mild concentric hypertrophy  DOPPLER: Doppler  parameters were consistent with abnormal left ventricular relaxation (grade 1 diastolic dysfunction)      RIGHT VENTRICLE: The size was normal  Systolic function was normal  DOPPLER: Systolic pressure was within the normal range      LEFT ATRIUM: Size was normal  No thrombus was identified      RIGHT ATRIUM: Size was normal      MITRAL VALVE: There was annular calcification  Valve structure was normal  There was normal leaflet separation  No echocardiographic evidence for prolapse  DOPPLER: The transmitral velocity was within the normal range  There was no  evidence for stenosis  There was mild regurgitation      AORTIC VALVE: The valve was trileaflet  Leaflets exhibited calcification and mildly reduced cuspal separation  DOPPLER: Transaortic velocity was within the normal range  There was mild stenosis  There was no regurgitation      TRICUSPID VALVE: The valve structure was normal  There was normal leaflet separation  DOPPLER: The transtricuspid velocity was within the normal range  There was moderate regurgitation  Pulmonary artery systolic pressure was within the  normal range  Estimated peak PA pressure was 39 mmHg      PULMONIC VALVE: Leaflets exhibited normal thickness, no calcification, and normal cuspal separation  DOPPLER: The transpulmonic velocity was within the normal range  There was mild regurgitation  ,  Pulmonary       GI/Hepatic            Endo/Other     GYN       Hematology   Musculoskeletal    Arthritis     Neurology   Psychology           Physical Exam    Airway    Mallampati score: II  TM Distance: >3 FB  Neck ROM: full     Dental       Cardiovascular      Pulmonary      Other Findings        Anesthesia Plan  ASA Score- 4 Emergent    Anesthesia Type- general with ASA Monitors  Additional Monitors:   Airway Plan: ETT  Plan Factors-    Induction- intravenous      Postoperative Plan-     Informed Consent- Anesthetic plan and risks discussed with patient  I personally reviewed this patient with the CRNA  Discussed and agreed on the Anesthesia Plan with the CRNA  Gerhardt Sep         Pt and family understand high risk of aspiration, death, MI CVA

## 2019-02-01 NOTE — H&P
Progress Note - Belem Wu 12/29/1922, 80 y o  female MRN: [id number]    Unit/Bed#: [address] [place] [address] Encounter: [id number]    Primary Care Provider: Taniya Francis DO   Date and time admitted to hospital: 1/31/2019  7:18 PM        * Hypoxia  Assessment & Plan   Patient is s/p endoscopy for food impaction  Per GI, patient's O2 saturation dropped to high 80s on RA prior to procedure  Post procedure her oxygen saturation remained at 88%, 94% on 2L nasal cannula  CXR is unchanged, however, patient may have aspirated  WBC was 14 81 on admission  Patient is admitted for observation  Admit to medicine  Continue 2L nasal cannula  Start on rocephin, azithromycin and flagyl  Urine for strep and legionella  Blood cultures x 2  Repeat labs in AM  Swallow eval in AM  Respiratory protocol    Dyslipidemia  Assessment & Plan   Hold BP meds pending swallow eval    Hypertension  Assessment & Plan   Hold BP meds pending swallow eval        VTE Prophylaxis: Enoxaparin (Lovenox)  / sequential compression device   Code Status: Level 3 - DNAR and DNI    Anticipated Length of Stay:  Patient will be admitted on an Observation basis with an anticipated length of stay of  Less than 2 midnights  Justification for Hospital Stay: hypoxia    Total Time for Visit, including Counseling / Coordination of Care: 20 minutes  Greater than 50% of this total time spent on direct patient counseling and coordination of care  Chief Complaint:   Difficulty Swallowing (Arrives via medic  Per medic, patient started last night with difficulty swallowing   Per medic patient with large amounts sputum and rhonchii  No edema or JVD   on arrival states this started after patient ate steak tonight  Patient had duoneb x 1 and albuterol x 1  Patient with wet voice) and Respiratory Distress      History of Present Illness:    Belem Wu is a 80 y o  female with a PMH of hypertension, hyperlipidemia, CKD, CAD, AVS who presents with dysphagia  Patient had steak for dinner and started to have difficulty swallowing  Patient was unable to swallow her own secretions and was spitting on arrival   She was taken to the OR and a large amount of fluid was evacuated from her esophagus  Postoperatively she had hypoxia so she is admitted for further management  Review of Systems:    Review of Systems   Constitutional: Negative  HENT: Positive for sore throat  Eyes: Negative  Respiratory: Positive for cough and choking  Cardiovascular: Negative  Gastrointestinal: Negative  Endocrine: Negative  Genitourinary: Negative  Musculoskeletal: Negative  Skin: Negative  Allergic/Immunologic: Negative  Neurological: Negative  Hematological: Negative  Psychiatric/Behavioral: Negative  Past Medical and Surgical History:     Past Medical History:  Diagnosis Date   Abnormal EKG    Aortic valve stenosis    Arthritis    CKD (chronic kidney disease), stage III ([facility] [address] )    Coronary artery disease    emergent PCI 11/18/13   Hypertension    Mitral and aortic regurgitation    Myocardial infarction ([facility] [address] ) 11/2013   with 1 stent      Past Surgical History:  Procedure Laterality Date   APPENDECTOMY     BILATERAL OOPHORECTOMY     CORONARY STENT PLACEMENT  2013   after MI (x1 stent)   DILATION AND CURETTAGE OF UTERUS     JOINT REPLACEMENT Left    knee   ND REMV CATARACT EXTRACAP,INSERT LENS Left 12/19/2016   Procedure: EXTRACTION EXTRACAPSULAR CATARACT PHACO INTRAOCULAR LENS (IOL); Surgeon: Whit Sandra MD;  Location: [facility] OR;  Service: Ophthalmology    [address] Right 6/18/2018   Procedure: EXTRACTION EXTRACAPSULAR CATARACT PHACO INTRAOCULAR LENS (IOL); Surgeon: Whit Sandra MD;  Location: [facility] OR;  Service: Ophthalmology   TONSILLECTOMY        Meds/Allergies:    Prior to Admission medications   Medication Sig Start Date End Date Taking?  Authorizing Provider  aspirin 81 MG tablet Take 81 mg by mouth 2 (two) times a day   Yes Historical Provider, MD  losartan-hydrochlorothiazide (HYZAAR) 100-25 MG per tablet Take 1 tablet by mouth daily  Patient taking differently: Take 1 tablet by mouth every morning   5/22/18  Yes Raul Saravia MD  Multiple Vitamins-Minerals (PRESERVISION AREDS) CAPS Take by mouth 7/25/17  Yes Historical Provider, MD  simvastatin (ZOCOR) 20 mg tablet Take 1 tablet (20 mg total) by mouth daily at bedtime 7/25/18  Yes Raul Saravia MD  amLODIPine (NORVASC) 5 mg tablet Take 1 tablet (5 mg total) by mouth daily for 30 days  Patient taking differently: Take 5 mg by mouth every evening   2/16/18 7/25/18  Raul Saravia MD  amoxicillin (AMOXIL) 500 mg capsule Take 500 mg by mouth as needed   2/8/18   Historical Provider, MD  Bromfenac Sodium (BROMSITE) 0 075 % SOLN Administer 1 drop to the right eye 2 (two) times a day 6/18/18   Nate Avelar MD  gatifloxacin (ZYMAXID) 0 5 % Administer 1 drop to the right eye 2 (two) times a day 6/18/18   Nate Avelar MD  omeprazole (PriLOSEC) 40 MG capsule Take 1 capsule (40 mg total) by mouth daily 1/31/19   Aurea Alvarez MD      Allergies: Allergies  Allergen Reactions   Metoprolol Other (See Comments)    According to previous medical record, you developed bradycardia and pre-syncope in 2015 while on metoprolol ER 25mg, so please DO NOT take metoprolol or other beta blocker        Social History:     Marital Status: /Civil Union   Substance Use History:   History  Alcohol Use   Yes    Comment: drinks whiskey 2 x week    History  Smoking Status   Never Smoker  Smokeless Tobacco   Never Used    History  Drug Use No      Family History:    Family History  Problem Relation Age of Onset   Heart disease Sister        enlarged heart      Physical Exam:     Vitals:   Blood Pressure: 168/72 (02/01/19 0130)  Pulse: 82 (02/01/19 0130)  Temperature: (!) 96 5 °F (35 8 °C) (02/01/19 0029)  Temp Source: Tympanic (02/01/19 0029)  Respirations: 21 (02/01/19 0130)  Weight - Scale: 71 kg (156 lb 8 4 oz) (01/31/19 1921)  SpO2: 94 % (02/01/19 0130)    Physical Exam   Constitutional: She is oriented to person, place, and time  She appears well-developed and well-nourished  No distress, speaking in full sentences and managing own secretions   HENT:   Head: Normocephalic and atraumatic  Eyes: Pupils are equal, round, and reactive to light  Neck: Normal range of motion  Cardiovascular: Normal rate and regular rhythm  Murmur heard  Pulmonary/Chest: Effort normal  She has wheezes  She has rhonchi  Abdominal: Soft  Bowel sounds are normal  She exhibits no distension  There is no tenderness  Musculoskeletal: Normal range of motion  She exhibits no edema  Neurological: She is alert and oriented to person, place, and time  Skin: Skin is warm and dry  No erythema  Psychiatric: She has a normal mood and affect  Nursing note and vitals reviewed  Additional Data:     Lab Results: I have personally reviewed pertinent reports  Results from last 7 days  Lab Units 01/31/19 1952  WBC Thousand/uL 14 81*  HEMOGLOBIN g/dL 11 6  HEMATOCRIT % 37 3  PLATELETS Thousands/uL 236  NEUTROS PCT % 75      Results from last 7 days  Lab Units 01/31/19 1952  SODIUM mmol/L 141  POTASSIUM mmol/L 3 8  CHLORIDE mmol/L 104  CO2 mmol/L 25  BUN mg/dL 25  CREATININE mg/dL 1 10  CALCIUM mg/dL 9 6  TOTAL BILIRUBIN mg/dL 0 30  ALK PHOS U/L 88  ALT U/L 14  AST U/L 9      Results from last 7 days  Lab Units 01/31/19 1952  INR  0 99      Results from last 7 days  Lab Units 01/31/19 1952  TROPONIN I ng/mL <0 02              Imaging: I have personally reviewed pertinent reports        XR chest 1 view portable    (Results Pending)  X-ray chest 1 view    (Results Pending)      XR chest 1 view portable    (Results Pending)  X-ray chest 1 view    (Results Pending)      EKG, Pathology, and Other Studies Reviewed on Admission:   EKG:  Pending    Allscripts / Epic Records Reviewed: Yes     ** Please Note: This note has been constructed using a voice recognition system   **

## 2019-02-01 NOTE — PLAN OF CARE
Problem: DISCHARGE PLANNING - CARE MANAGEMENT  Goal: Discharge to post-acute care or home with appropriate resources  INTERVENTIONS:  - Conduct assessment to determine patient/family and health care team treatment goals, and need for post-acute services based on payer coverage, community resources, and patient preferences, and barriers to discharge  - Address psychosocial, clinical, and financial barriers to discharge as identified in assessment in conjunction with the patient/family and health care team  - Arrange appropriate level of post-acute services according to patient's   needs and preference and payer coverage in collaboration with the physician and health care team  - Communicate with and update the patient/family, physician, and health care team regarding progress on the discharge plan  - Arrange appropriate transportation to post-acute venues  Outcome: Adequate for Discharge  Plan: discharge to home with no needs

## 2019-02-01 NOTE — ANESTHESIA POSTPROCEDURE EVALUATION
Post-Op Assessment Note      CV Status:  Stable    Mental Status:  Alert    Hydration Status:  Stable    PONV Controlled:  None    Airway Patency:  Patent  Airway: intubated    Post Op Vitals Reviewed: Yes          Staff: Anesthesiologist           BP     Temp     Pulse     Resp      SpO2

## 2019-02-01 NOTE — PLAN OF CARE
INFECTION - ADULT     Absence or prevention of progression during hospitalization Progressing     Absence of fever/infection during neutropenic period Progressing        Knowledge Deficit     Patient/family/caregiver demonstrates understanding of disease process, treatment plan, medications, and discharge instructions Progressing        PAIN - ADULT     Verbalizes/displays adequate comfort level or baseline comfort level Progressing        Potential for Falls     Patient will remain free of falls Progressing        RESPIRATORY - ADULT     Achieves optimal ventilation and oxygenation Progressing

## 2019-02-01 NOTE — ED NOTES
Pt still using yankauer to suction oral secretions, states soreness in throat is a little better     Erika Barnett RN  01/31/19 2023

## 2019-02-01 NOTE — ED NOTES
Pt awake, alert, able to speak, states throat is sore, unable to swallow saliva, spits it out or uses yankauer to suction saliva, breathing unlabored     Philippe Carson RN  01/31/19 1958

## 2019-02-01 NOTE — OP NOTE
ESOPHAGOGASTRODUODENOSCOPY    PROCEDURE: EGD    SEDATION: Monitored anesthesia care, check anesthesia records    ASA Class: 3E    INDICATIONS:  Dysphagia with food impaction    CONSENT:  Informed consent was obtained for the procedure, including sedation after explaining the risks and benefits of the procedure  Risks including but not limited to bleeding, perforation, infection, and missed lesion  PREPARATION:   Telemetry, pulse oximetry, blood pressure were monitored throughout the procedure  Patient was identified by myself both verbally and by visual inspection of ID band  DESCRIPTION:   Patient was placed in the left lateral decubitus position and was sedated with the above medication  The gastroscope was introduced in to the oropharynx and the esophagus was intubated under direct visualization  Scope was passed down the esophagus up to 2nd part of the duodenum  A careful inspection was made as the gastroscope was withdrawn, including a retroflexed view of the stomach; findings and interventions are described below  FINDINGS:    #1  Esophagus- there was a large amount of fluid and solid food debris throughout the esophagus  Some of the food was suctioned and pulled out through the mouth  Most of the food and fluid was suctioned as much as possible and then the endoscope was gently passed into the stomach  There was a large amount of solid food debris in the distal esophagus and this followed the scope into the stomach  I then gently pushed the remaining pieces of food debris into the stomach  There was evidence of LA class C erosive reflux esophagitis but no discrete stricture or mass  #2  Stomach- there was a small sliding hiatal hernia and a large amount of food debris  #3  Duodenum- normal          IMPRESSIONS:      Food impaction  Hiatal hernia  Erosive reflux esophagitis    RECOMMENDATIONS:     1  Omeprazole 40 mg p o  Daily for two months  2   Repeat upper endoscopy in two months to document healing of the erosive reflux esophagitis and take biopsies if any residual abnormalities are found  It would also be an opportunity to evaluate the rest of the stomach which could not be visualized due to the large amount of food and liquid in the stomach  COMPLICATIONS:  None; patient tolerated the procedure well      SPECIMENS:  * No specimens in log *    ESTIMATED BLOOD LOSS:  Minimal

## 2019-02-01 NOTE — PROGRESS NOTES
[address] Internal Medicine Progress Note  Patient: Shannon Rodriguez 80 y o  female   MRN: [id number]  PCP: Bharat Verde DO  Unit/Bed#: [address] [place] [address] Encounter: [id number]  Date Of Visit: 02/01/19    Problem List:    Principal Problem:    Aspiration pneumonia ([address] )  Active Problems:    CAD (coronary artery disease)    Hypertension    Dyslipidemia      Assessment & Plan:    * Aspiration pneumonia [facility])   Assessment & Plan    Patient is s/p endoscopy for food impaction  Per GI, patient's O2 saturation dropped to high 80s on RA prior to procedure  Post procedure her oxygen saturation remained at 88%, 94% on 2L nasal cannula  Chest x-ray without any significant abnormality but patient may have aspirated    WBC was 14 81 on admission, continues to worsen  Likely aspiration pneumonia versus pneumonitis   · Will change antibiotics to Zosyn  · Respiratory protocol, incentive spirometry  · Check procalcitonin  · Monitor respiratory symptoms  · Swallow evaluation     Food impaction of esophagusresolved as of 1/31/2019   Assessment & Plan    Patient presented with food impaction, history of intermittent dysphagia  Underwent emergent EGD on 1/31, noted to have significant for impaction, hiatal hernia and erosive esophagitis  No definitive obstruction  Likely Presbyesophagus  Recommended PPI for 2 months and follow up with GI for repeat EGD       CAD (coronary artery disease)   Assessment & Plan    Continue antihypertensives and Zocor  Hold aspirin today     Dyslipidemia   Assessment & Plan    Resume Zocor     Hypertension   Assessment & Plan    Resume Norvasc and losartan  Monitor           VTE Pharmacologic Prophylaxis:   Pharmacologic: Enoxaparin (Lovenox)  Mechanical VTE Prophylaxis in Place: Yes    Patient Centered Rounds: I have performed bedside rounds with nursing staff today      Discussions with Specialists or Other Care Team Provider: Yes    Education and Discussions with Family / Patient:Yes family at bedside    Time Spent for Care: 45 minutes  More than 50% of total time spent on counseling and coordination of care as described above  Current Length of Stay: 0 day(s)    Current Patient Status: Observation     Discharge Plan:  Home when clinically improved    Code Status: Level 3 - DNAR and DNI    Certification Statement: The patient will continue to require additional inpatient hospital stay due to Possible aspiration pneumonia/pneumonitis,  worsening of leukocytosis      Subjective:   Overall feels well, feels hungry  Leukocytosis worsened from 14K to 29K  Denies any chest pain shortness of breath or significant cough  Reports mild sore throat  Denies headache  Denies any abdominal pain    Objective:   Comfortable      Negative for Chest Pain, Palpitations, Shortness of Breath, Abdominal Pain, Nausea, Vomiting, Constipation, Diarrhea, Dizziness  All other 10 review of systems negative and without drastic interval changes from yesterday  Vitals:   Temp (24hrs), Av 5 °F (36 4 °C), Min:96 5 °F (35 8 °C), Max:99 3 °F (37 4 °C)    Temp:  [96 5 °F (35 8 °C)-99 3 °F (37 4 °C)] 99 1 °F (37 3 °C)  HR:  [77-96] 83  Resp:  [17-23] 18  BP: (117-217)/(55-93) 117/55  SpO2:  [88 %-98 %] 98 % 2 L of supplemental oxygen  Body mass index is 28 86 kg/m²  Input and Output Summary (last 24 hours):     No intake or output data in the 24 hours ending 19 1207    Physical Exam:     Physical Exam   Constitutional: She appears well-developed  No distress  Nasal cannula in place  HENT:   Head: Normocephalic and atraumatic  Nose: Nose normal    Mouth/Throat: No oropharyngeal exudate  Eyes: Pupils are equal, round, and reactive to light  Conjunctivae are normal    Neck: Normal range of motion  Neck supple  Cardiovascular: Normal rate, regular rhythm and normal heart sounds  Pulmonary/Chest: Effort normal  No respiratory distress  She has decreased breath sounds  She has no wheezes  She has no rhonchi   She has no rales  Abdominal: Soft  Bowel sounds are normal  She exhibits no distension  There is no tenderness  There is no rebound and no guarding  Musculoskeletal: She exhibits no edema  Neurological: She is alert  No cranial nerve deficit  Skin: Skin is warm and dry  No rash noted  Additional Data:     Labs:      Results from last 7 days  Lab Units 02/01/19 0608 01/31/19 1952   WBC Thousand/uL 29 39* 14 81*   HEMOGLOBIN g/dL 9 0* 11 6   HEMATOCRIT % 29 3* 37 3   PLATELETS Thousands/uL 181 236   NEUTROS PCT %  --  75   LYMPHS PCT %  --  11*   MONOS PCT %  --  11   EOS PCT %  --  1       Results from last 7 days  Lab Units 02/01/19  0608 01/31/19 1952   POTASSIUM mmol/L 3 6 3 8   CHLORIDE mmol/L 109* 104   CO2 mmol/L 25 25   BUN mg/dL 23 25   CREATININE mg/dL 1 03 1 10   CALCIUM mg/dL 8 3 9 6   ALK PHOS U/L  --  88   ALT U/L  --  14   AST U/L  --  9       Results from last 7 days  Lab Units 01/31/19 1952   INR  0 99       * I Have Reviewed All Lab Data Listed Above  * Additional Pertinent Lab Tests Reviewed: All Labs For Current Hospital Admission Reviewed    Imaging:  Xr Chest 1 View Portable    Result Date: 2/1/2019  Narrative: CHEST INDICATION:   sob  Difficulty swallowing  Productive cough  COMPARISON:  April 16, 2017 EXAM PERFORMED/VIEWS:  XR CHEST PORTABLE FINDINGS: Top normal cardiac size  Pulmonary vasculature within normal range  Calcified plaque within the arch  The lungs are clear  No pneumothorax or pleural effusion  Degenerative changes in severe subacromial narrowing on the right  Stable compared to prior  Impression: No acute cardiopulmonary disease  Workstation performed: [id number]     X-ray Chest 1 View    Result Date: 2/1/2019  Narrative: CHEST INDICATION:   rule out aspiration, food bolus  Difficulty swallowing  COMPARISON:  1/31/2019 EXAM PERFORMED/VIEWS:  XR CHEST 1 VIEW FINDINGS: Cardiomediastinal silhouette appears unremarkable  The lungs are clear    No pneumothorax or pleural effusion  Osseous structures appear within normal limits for patient age  Impression: No acute cardiopulmonary disease  Workstation performed: [id number]     Imaging Reports Reviewed by myself    Cultures:   Blood Culture: No results found for: BLOODCX  Urine Culture: No results found for: URINECX  Sputum Culture: No components found for: SPUTUMCX  Wound Culture: No results found for: WOUNDCULT    Last 24 Hours Medication List:     Current Facility-Administered Medications:  acetaminophen 650 mg Oral Q6H PRN Jazz Hardy MD    amLODIPine 5 mg Oral QPM Jazz Hardy MD    diphenhydrAMINE 12 5 mg Intravenous Once PRN Idalia Marquez MD    enoxaparin 40 mg Subcutaneous Daily TAN Garcia    losartan 25 mg Oral Daily Jazz Hardy MD    ondansetron 4 mg Intravenous Once PRN Idalia Marquez MD    pantoprazole 40 mg Oral Early Morning Jazz Hardy MD    piperacillin-tazobactam 3 375 g Intravenous Lyndsey Musa MD    sodium chloride 50 mL/hr Intravenous Continuous TAN Garcia Last Rate: 50 mL/hr (02/01/19 0123)        Today, Patient Was Seen By: Jazz Hardy MD    ** Please Note: "This note has been constructed using a voice recognition system  Therefore there may be syntax, spelling, and/or grammatical errors   Please call if you have any questions  "**

## 2019-02-01 NOTE — SPEECH THERAPY NOTE
Speech Language/Pathology  Bedside Swallowing Evaluation    Patient Name: Salma Francisco  VVSAZ'B Date: 2/1/2019     Problem List  Patient Active Problem List   Diagnosis    Vertigo    Hypertension    Dyslipidemia    Arthritis    CAD (coronary artery disease)    Hypoxia     Past Medical History  Past Medical History:   Diagnosis Date    Abnormal EKG     Aortic valve stenosis     Arthritis     CKD (chronic kidney disease), stage III ([facility] [address] )     Coronary artery disease     emergent PCI 11/18/13    Hypertension     Mitral and aortic regurgitation     Myocardial infarction ([facility] [address] ) 11/2013    with 1 stent     Past Surgical History  Past Surgical History:   Procedure Laterality Date    APPENDECTOMY      BILATERAL OOPHORECTOMY      CORONARY STENT PLACEMENT  2013    after MI (x1 stent)    DILATION AND CURETTAGE OF UTERUS      JOINT REPLACEMENT Left     knee    [place] REMV CATARACT EXTRACAP,INSERT LENS Left 12/19/2016    Procedure: EXTRACTION EXTRACAPSULAR CATARACT PHACO INTRAOCULAR LENS (IOL); Surgeon: Linda Sprague MD;  Location: [facility] MAIN OR;  Service: Ophthalmology    [place] [address] [place] [address] Avenue Right 6/18/2018    Procedure: EXTRACTION EXTRACAPSULAR CATARACT PHACO INTRAOCULAR LENS (IOL); Surgeon: Linda Sprague MD;  Location: [facility] MAIN OR;  Service: Ophthalmology    TONSILLECTOMY          02/01/19 1600   Swallow Information   Current Risks for Dysphagia & Aspiration (Esophageal impaction of solid food following meal of steak  )   Current Symptoms/Concerns (s/p EGD  Determine safety of PO intake )   Current Diet NPO   Baseline Diet Regular; Thin liquids   Baseline Assessment   Behavior/Cognition Alert; Cooperative; Interactive   Speech/Language Status Expressive and receptive language skills are WNL  No dysarthria     Patient Positioning Upright in bed   Swallow Mechanism Exam   Labial Symmetry WFL   Labial Strength WFL   Labial ROM WFL   Labial Sensation WFL   Facial Symmetry WFL   Facial Strength WFL   Facial ROM WFL   Facial Sensation WFL   Lingual Symmetry WFL   Lingual Strength WFL   Lingual ROM WFL   Lingual Sensation WFL   Velum WFL   Gag (Did not assess )   Mandible WFL   Dentition Adequate   Volitional Cough Strong   Tracheostomy No   Consistencies Assessed and Performance   Materials Admnistered Regular/Solid; Thin liquid   Materials Adminstered Comment Water from a straw, cookie  Oral Stage WFL   Oral Stage Comment Normal timing of oral preparation and transport for liquid and solid  Patient independently takes smaller bites of solids  Phargngeal Stage WFL   Pharyngeal Stage Comment No delay in the initiation of the swallow, good laryngeal elevation, no coughing or choking during or folowing the swallow, clear voice quality following the swallow, no sensation of residual following the swallow on liquid or solid  Swallow Mechanics WFL;Swallow initation; Appears prompt;Good Larygneal rise   Esophageal Concerns (Esophageal food impaction, s/p EGD )   Strategies and Efficacy smaller bites of solids  Summary   Swallow Summary Swallow skills are safe and WNL for a regular consistency diet and thin liquids  Recommendations   Risk for Aspiration None   Recommendations Consider oral diet; Dysphagia treatment   Diet Solid Recommendation Regular consistency   Diet Liquid Recommendation Thin liquid   Recommended Form of Meds As desired   General Precautions Aspiration precautions;Upright as possible for all oral intake;Remain upright for 45 mins after meals   Compensatory Swallowing Strategies External pacing  (smaller bites of solid )   Further Evaluations (Follow up with GI )   Results Reviewed with MD;PT/Family/Caregiver   Treatment Recommendations   Duration of treatment 1-2 follow up sessions  Follow up treatments Strategy training; Assure diet tolerance; Patient/family education   Dysphagia Goals Patient will tolerate recommended diet without observed clinical signs of oral/pharngeal dysphagia   Speech Therapy Prognosis   Prognosis Good   Prognosis Considerations Family/Community Support; Patient Participation Level; Family/Caregiver Participation Level; Availability of Services;Previous Level of Function     GAVIN Grissom S , [address] [facility]  Speech-Language Pathologist  [id number]

## 2019-02-01 NOTE — CONSULTS
Consultation - [address] [facility]  Emerson Hays 80 y o  female MRN: [id number]  Unit/Bed#: ROVERTO Encounter: [id number]        Inpatient consult to gastroenterology  Consult performed by: GRAYSON Voss  Consult ordered by: Mundo Garcia          Reason for Consult / Principal Problem:     Dysphagia with food impaction      ASSESSMENT AND PLAN:      Dysphagia with food impaction:  She appears to have intermittent dysphagia symptoms and now has her first food impaction  This could be due to a peptic stricture, Schatzki ring, reflux esophagitis, or malignancy  We will plan for an urgent upper endoscopy to evaluate and attempt to remove the food impaction  She will be prophylactically intubated for the procedure  We will have further recommendations after this has been completed  ______________________________________________________________________    HPI:  She presents to the hospital because of a food impaction  She said she has had intermittent dysphagia for solids for the past 3 to 4 years but has never had food gets stuck completely  She does not have any dysphagia for liquids and she denies any nausea, vomiting, abdominal pain, change in bowel habits, bleeding, or weight loss  She said she has never previously had an upper endoscopy and she denies any reflux symptoms  REVIEW OF SYSTEMS:    CONSTITUTIONAL: Denies any fever, chills, rigors, and weight loss  HEENT: No earache or tinnitus  Denies hearing loss or visual disturbances, but has some throat discomfort  CARDIOVASCULAR: No chest pain or palpitations  RESPIRATORY: Denies any cough, hemoptysis, shortness of breath or dyspnea on exertion  GASTROINTESTINAL: As noted in the History of Present Illness  GENITOURINARY: No problems with urination  Denies any hematuria or dysuria  NEUROLOGIC: No dizziness or vertigo, denies headaches  MUSCULOSKELETAL: Denies any muscle or joint pain  SKIN: Denies skin rashes or itching  ENDOCRINE: Denies excessive thirst  Denies intolerance to heat or cold  PSYCHOSOCIAL: Denies depression or anxiety  Denies any recent memory loss  Historical Information   Past Medical History:   Diagnosis Date    Abnormal EKG     Aortic valve stenosis     Arthritis     CKD (chronic kidney disease), stage III (HCC)     Coronary artery disease     emergent PCI 11/18/13    Hypertension     Mitral and aortic regurgitation     Myocardial infarction ([address] ) 11/2013    with 1 stent     Past Surgical History:   Procedure Laterality Date    APPENDECTOMY      BILATERAL OOPHORECTOMY      CORONARY STENT PLACEMENT  2013    after MI (x1 stent)    DILATION AND CURETTAGE OF UTERUS      JOINT REPLACEMENT Left     knee    [place] REMV CATARACT EXTRACAP,INSERT LENS Left 12/19/2016    Procedure: EXTRACTION EXTRACAPSULAR CATARACT PHACO INTRAOCULAR LENS (IOL); Surgeon: Kylah Gillette MD;  Location: [facility] MAIN OR;  Service: Ophthalmology    [address] Right 6/18/2018    Procedure: EXTRACTION EXTRACAPSULAR CATARACT PHACO INTRAOCULAR LENS (IOL); Surgeon: Kylah Gillette MD;  Location: [facility] MAIN OR;  Service: Ophthalmology    TONSILLECTOMY       Social History   History   Alcohol Use    Yes     Comment: drinks whiskey 2 x week     History   Drug Use No     History   Smoking Status    Never Smoker   Smokeless Tobacco    Never Used     Family History   Problem Relation Age of Onset    Heart disease Sister         enlarged heart       Meds/Allergies       (Not in a hospital admission)  No current facility-administered medications for this encounter  Allergies   Allergen Reactions    Metoprolol Other (See Comments)     According to previous medical record, you developed bradycardia and pre-syncope in 2015 while on metoprolol ER 25mg, so please DO NOT take metoprolol or other beta blocker             Objective     Blood pressure 153/70, pulse 90, temperature (!) 96 7 °F (35 9 °C), temperature source Tympanic, resp  rate 22, weight 71 kg (156 lb 8 4 oz), SpO2 93 %  Body mass index is 28 86 kg/m²  No intake or output data in the 24 hours ending 01/31/19 2146      PHYSICAL EXAM:      General Appearance:   Alert, cooperative, no distress, elderly frail   HEENT:   Normocephalic, atraumatic, anicteric      Neck:  Supple, symmetrical, trachea midline   Lungs:   She has bibasilar crackles but no rhonchi or wheezing; respirations unlabored    Heart[de-identified]   Regular rate and rhythm; no murmur, rub, or gallop     Abdomen:   Soft, obese, non-tender, non-distended; normal bowel sounds; no masses, no organomegaly    Genitalia:   Deferred    Rectal:   Deferred    Extremities:  No cyanosis, clubbing or edema    Pulses:  2+ and symmetric all extremities    Skin:  No jaundice, rashes, or lesions    Lymph nodes:  No palpable cervical lymphadenopathy        Lab Results:   Admission on 01/31/2019   Component Date Value    WBC 01/31/2019 14 81*    RBC 01/31/2019 3 74*    Hemoglobin 01/31/2019 11 6     Hematocrit 01/31/2019 37 3     MCV 01/31/2019 100*    MCH 01/31/2019 31 0     MCHC 01/31/2019 31 1*    RDW 01/31/2019 12 7     MPV 01/31/2019 10 4     Platelets 73/65/9329 236     nRBC 01/31/2019 0     Neutrophils Relative 01/31/2019 75     Immat GRANS % 01/31/2019 1     Lymphocytes Relative 01/31/2019 11*    Monocytes Relative 01/31/2019 11     Eosinophils Relative 01/31/2019 1     Basophils Relative 01/31/2019 1     Neutrophils Absolute 01/31/2019 11 27*    Immature Grans Absolute 01/31/2019 0 12     Lymphocytes Absolute 01/31/2019 1 68     Monocytes Absolute 01/31/2019 1 57*    Eosinophils Absolute 01/31/2019 0 10     Basophils Absolute 01/31/2019 0 07     Protime 01/31/2019 10 4     INR 01/31/2019 0 99     PTT 01/31/2019 29     Sodium 01/31/2019 141     Potassium 01/31/2019 3 8     Chloride 01/31/2019 104     CO2 01/31/2019 25     ANION GAP 01/31/2019 12     BUN 01/31/2019 25     Creatinine 01/31/2019 1 10     Glucose 01/31/2019 144*    Calcium 01/31/2019 9 6     AST 01/31/2019 9     ALT 01/31/2019 14     Alkaline Phosphatase 01/31/2019 88     Total Protein 01/31/2019 7 1     Albumin 01/31/2019 3 2*    Total Bilirubin 01/31/2019 0 30     eGFR 01/31/2019 42     Troponin I 01/31/2019 <0 02        Imaging Studies: I have personally reviewed pertinent imaging studies

## 2019-02-01 NOTE — PHYSICAL THERAPY NOTE
PT EVALUATION       02/01/19 7163   Note Type   Note type Eval/Treat   Pain Assessment   Pain Assessment No/denies pain   Home Living   Type of [address] to live on main level with bedroom/bathroom   Bathroom Shower/Tub Tub/shower unit   Bathroom Toilet Raised   Home Equipment Cane;Walker   Prior Function   Level of [place] (ambulates with cane, independent ADL)   Lives With Spouse   Receives Help From Family   ADL Assistance Independent   Falls in the last 6 months 0   Restrictions/Precautions   Other Precautions Fall Risk   General   Additional Pertinent History Pt is s/p esophageal food impaction and EGD to remove it  Pt now cleared by speech to eat  Cognition   Overall Cognitive Status WFL   RLE Assessment   RLE Assessment (ROM WFL, MMT 4/5)   LLE Assessment   LLE Assessment (ROM WFL, MMT 4/5)   Bed Mobility   Supine to Sit 5  Supervision   Transfers   Sit to Stand 4  Minimal assistance   Stand to Sit 4  Minimal assistance   Stand pivot 4  Minimal assistance   Ambulation/Elevation   Gait pattern (flexed posture, unsteady)   Gait Assistance (min to mod assist)   Assistive Device [facility]   Distance 20 feet   Balance   Static Sitting Fair +   Dynamic Sitting Fair   Static Standing Fair   Dynamic Standing Poor   Activity Tolerance   Activity Tolerance Patient limited by fatigue;Patient tolerated treatment well   Assessment   Prognosis Good   Problem List Decreased strength;Decreased endurance; Impaired balance;Decreased mobility   Assessment Patient seen for Physical Therapy evaluation  Patient admitted with Hypoxia  Comorbidities affecting patient's physical performance include:vertigo, arthritis, CAD   Personal factors affecting patient at time of initial evaluation include: ambulating with assistive device, stairs to enter home, inability to navigate level surfaces without external assistance, hearing impairments and inability to perform ADLS   Prior to admission, patient was independent with functional mobility with cane and independent with ADLS  Please find objective findings from Physical Therapy assessment regarding body systems outlined above with impairments and limitations including weakness, impaired balance, decreased endurance, decreased activity tolerance, decreased functional mobility tolerance and fall risk  The Barthel Index was used as a functional outcome tool presenting with a score of 45 today indicating marked limitations of functional mobility and ADLS  Patient's clinical presentation is currently unstable/unpredictable as seen in patient's presentation of increased fall risk, new onset of impairment of functional mobility and new onset of weakness  Pt would benefit from continued Physical Therapy treatment to address deficits as defined above and maximize level of functional mobility  As demonstrated by objective findings, the assigned level of complexity for this evaluation is high  Goals   Patient Goals "have something to eat or drink"   STG Expiration Date 02/08/19   Short Term Goal #1 independent bed mobility, independent transfers, supervision ambulation with walker 100 feet, min assist up and down 7 steps    LTG Expiration Date 02/15/19   Long Term Goal #1 independent ambulation with least restrictive device 100 feet so pt can negotiate her home, supervision up and down 7 steps so pt can enter and exit her home   Plan   Treatment/Interventions ADL retraining;Functional transfer training;LE strengthening/ROM; Elevations; Therapeutic exercise;Cognitive reorientation;Equipment eval/education; Bed mobility;Gait training;Patient/family training  Ab Stone CM TIger texted)   Recommendation   Recommendation 24 hour supervision/assist;Home PT   Equipment Recommended (pt has a cane and a walker)   Barthel Index   Feeding 5   Bathing 0   Grooming Score 0   Dressing Score 5   Bladder Score 10   Bowels Score 10   Toilet Use Score 5   Transfers (Bed/Chair) Score 10   Mobility (Level Surface) Score 0   Stairs Score 0   Barthel Index Score 39   Licensure   [place] License Number  Anthony Verde PT [id number]       Time In: 5191  Time IP  Total Time: 10      S:  "I feel good but I'm a little weak"   O:  Pt ambulated with rolling walker 60 feet with min assist     A:  Improved quality of gait and endurance with use of walker  Pt fatigued after activity  Currently pt will need 24 hr assist for mobility and use of a walker to go home  P:  Continue PT  Plan to try steps next session as pt has 7 NARESH      Fan Blair, [phone]

## 2019-02-01 NOTE — PROGRESS NOTES
Progress Note - Shannon Rodriguez 12/29/1922, 80 y o  female MRN: [id number]    Unit/Bed#: [address] [place] [address] Encounter: [id number]    Primary Care Provider: Bharat Verde DO   Date and time admitted to hospital: 1/31/2019  7:18 PM        * Hypoxia   Assessment & Plan    Patient is s/p endoscopy for food impaction  Per GI, patient's O2 saturation dropped to high 80s on RA prior to procedure  Post procedure her oxygen saturation remained at 88%, 94% on 2L nasal cannula  CXR is unchanged, however, patient may have aspirated  WBC was 14 81 on admission  Patient is admitted for observation  · Admit to medicine  · Continue 2L nasal cannula  · Start on rocephin, azithromycin and flagyl  · Urine for strep and legionella  · Blood cultures x 2  · Repeat labs in AM  · Swallow eval in AM  · Respiratory protocol     Dyslipidemia   Assessment & Plan    Hold BP meds pending swallow eval     Hypertension   Assessment & Plan    Hold BP meds pending swallow eval         VTE Prophylaxis: Enoxaparin (Lovenox)  / sequential compression device   Code Status: Level 3 - DNAR and DNI    Anticipated Length of Stay:  Patient will be admitted on an Observation basis with an anticipated length of stay of  Less than 2 midnights  Justification for Hospital Stay: hypoxia    Total Time for Visit, including Counseling / Coordination of Care: 20 minutes  Greater than 50% of this total time spent on direct patient counseling and coordination of care  Chief Complaint:   Difficulty Swallowing (Arrives via medic  Per medic, patient started last night with difficulty swallowing   Per medic patient with large amounts sputum and rhonchii  No edema or JVD   on arrival states this started after patient ate steak tonight  Patient had duoneb x 1 and albuterol x 1   Patient with wet voice) and Respiratory Distress      History of Present Illness:    Shannon Rodriguez is a 80 y o  female with a PMH of hypertension, hyperlipidemia, CKD, CAD, AVS who presents with dysphagia  Patient had steak for dinner and started to have difficulty swallowing  Patient was unable to swallow her own secretions and was spitting on arrival   She was taken to the OR and a large amount of fluid was evacuated from her esophagus  Postoperatively she had hypoxia so she is admitted for further management  Review of Systems:    Review of Systems   Constitutional: Negative  HENT: Positive for sore throat  Eyes: Negative  Respiratory: Positive for cough and choking  Cardiovascular: Negative  Gastrointestinal: Negative  Endocrine: Negative  Genitourinary: Negative  Musculoskeletal: Negative  Skin: Negative  Allergic/Immunologic: Negative  Neurological: Negative  Hematological: Negative  Psychiatric/Behavioral: Negative  Past Medical and Surgical History:     Past Medical History:   Diagnosis Date    Abnormal EKG     Aortic valve stenosis     Arthritis     CKD [facility]chronic [facility] disease[facility] stage III ([facility] [address] [facility]     Coronary artery disease     emergent PCI 11/18/13    Hypertension     Mitral and aortic regurgitation     Myocardial infarction ([facility] [address] ) 11/2013    with 1 stent       Past Surgical History:   Procedure Laterality Date    APPENDECTOMY      BILATERAL OOPHORECTOMY      CORONARY STENT PLACEMENT  2013    after MI (x1 stent)    DILATION AND CURETTAGE OF UTERUS      JOINT REPLACEMENT Left     knee    [place] REMV CATARACT EXTRACAP,INSERT LENS Left 12/19/2016    Procedure: EXTRACTION EXTRACAPSULAR CATARACT PHACO INTRAOCULAR LENS (IOL); Surgeon: Katie Cook MD;  Location: [facility] MAIN OR;  Service: Ophthalmology    [place] [address] [place] [address] Right 6/18/2018    Procedure: EXTRACTION EXTRACAPSULAR CATARACT PHACO INTRAOCULAR LENS (IOL);   Surgeon: Katie Cook MD;  Location: [facility] OR;  Service: Ophthalmology    TONSILLECTOMY         Meds/Allergies:    Prior to Admission medications    Medication Sig Start Date End Date Taking? Authorizing Provider   aspirin 81 MG tablet Take 81 mg by mouth 2 (two) times a day   Yes Historical Provider, MD   losartan-hydrochlorothiazide (HYZAAR) 100-25 MG per tablet Take 1 tablet by mouth daily  Patient taking differently: Take 1 tablet by mouth every morning   5/22/18  Yes Grace Guillermo MD   Multiple Vitamins-Minerals (PRESERVISION AREDS) CAPS Take by mouth 7/25/17  Yes Historical Provider, MD   simvastatin (ZOCOR) 20 mg tablet Take 1 tablet (20 mg total) by mouth daily at bedtime 7/25/18  Yes Grace Guillermo MD   amLODIPine (NORVASC) 5 mg tablet Take 1 tablet (5 mg total) by mouth daily for 30 days  Patient taking differently: Take 5 mg by mouth every evening   2/16/18 7/25/18  Grace Guillermo MD   amoxicillin (AMOXIL) 500 mg capsule Take 500 mg by mouth as needed   2/8/18   Historical Provider, MD   Bromfenac Sodium (BROMSITE) 0 075 % SOLN Administer 1 drop to the right eye 2 (two) times a day 6/18/18   Isabelle Escalante MD   gatifloxacin (ZYMAXID) 0 5 % Administer 1 drop to the right eye 2 (two) times a day 6/18/18   Isabelle Escalante MD   omeprazole (PriLOSEC) 40 MG capsule Take 1 capsule (40 mg total) by mouth daily 1/31/19   Vicente Finney MD       Allergies: Allergies   Allergen Reactions    Metoprolol Other (See Comments)     According to previous medical record, you developed bradycardia and pre-syncope in 2015 while on metoprolol ER 25mg, so please DO NOT take metoprolol or other beta blocker         Social History:     Marital Status: /Civil Union   Substance Use History:   History   Alcohol Use    Yes     Comment: drinks whiskey 2 x week     History   Smoking Status    Never Smoker   Smokeless Tobacco    Never Used     History   Drug Use No       Family History:    Family History   Problem Relation Age of Onset    Heart disease Sister         enlarged heart       Physical Exam:     Vitals:   Blood Pressure: 168/72 (02/01/19 0130)  Pulse: 82 (02/01/19 0130)  Temperature: (!) 96 5 °F (35 8 °C) (02/01/19 0029)  Temp Source: Tympanic (02/01/19 0029)  Respirations: 21 (02/01/19 0130)  Weight - Scale: 71 kg (156 lb 8 4 oz) (01/31/19 1921)  SpO2: 94 % (02/01/19 0130)    Physical Exam   Constitutional: She is oriented to person, place, and time  She appears well-developed and well-nourished  No distress, speaking in full sentences and managing own secretions   HENT:   Head: Normocephalic and atraumatic  Eyes: Pupils are equal, round, and reactive to light  Neck: Normal range of motion  Cardiovascular: Normal rate and regular rhythm  Murmur heard  Pulmonary/Chest: Effort normal  She has wheezes  She has rhonchi  Abdominal: Soft  Bowel sounds are normal  She exhibits no distension  There is no tenderness  Musculoskeletal: Normal range of motion  She exhibits no edema  Neurological: She is alert and oriented to person, place, and time  Skin: Skin is warm and dry  No erythema  Psychiatric: She has a normal mood and affect  Nursing note and vitals reviewed  Additional Data:     Lab Results: I have personally reviewed pertinent reports  Results from last 7 days  Lab Units 01/31/19 1952   WBC Thousand/uL 14 81*   HEMOGLOBIN g/dL 11 6   HEMATOCRIT % 37 3   PLATELETS Thousands/uL 236   NEUTROS PCT % 75       Results from last 7 days  Lab Units 01/31/19 1952   SODIUM mmol/L 141   POTASSIUM mmol/L 3 8   CHLORIDE mmol/L 104   CO2 mmol/L 25   BUN mg/dL 25   CREATININE mg/dL 1 10   CALCIUM mg/dL 9 6   TOTAL BILIRUBIN mg/dL 0 30   ALK PHOS U/L 88   ALT U/L 14   AST U/L 9       Results from last 7 days  Lab Units 01/31/19 1952   INR  0 99       Results from last 7 days  Lab Units 01/31/19 1952   TROPONIN I ng/mL <0 02               Imaging: I have personally reviewed pertinent reports        XR chest 1 view portable    (Results Pending)   X-ray chest 1 view    (Results Pending)       XR chest 1 view portable    (Results Pending)   X-ray chest 1 view    (Results Pending)       EKG, Pathology, and Other Studies Reviewed on Admission:   · EKG:  Pending    Allscripts / Epic Records Reviewed: Yes     ** Please Note: This note has been constructed using a voice recognition system   **

## 2019-02-01 NOTE — RESPIRATORY THERAPY NOTE
RT Protocol Note  Rafael Chacko 80 y o  female MRN: [id number]  Unit/Bed#: [address] [place] [address] Encounter: [id number]    Assessment    Principal Problem:    Hypoxia  Active Problems:    Hypertension    Dyslipidemia      Home Pulmonary Medications:         Past Medical History:   Diagnosis Date    Abnormal EKG     Aortic valve stenosis     Arthritis     CKD (chronic kidney disease), stage III (HCC)     Coronary artery disease     emergent PCI 11/18/13    Hypertension     Mitral and aortic regurgitation     Myocardial infarction ([address] ) 11/2013    with 1 stent     Social History     Social History    Marital status: /Civil Union     Spouse name: N/A    Number of children: N/A    Years of education: N/A     Social History Main Topics    Smoking status: Never Smoker    Smokeless tobacco: Never Used    Alcohol use Yes      Comment: drinks whiskey 2 x week    Drug use: No    Sexual activity: Not Asked     Other Topics Concern    None     Social History Narrative    None       Subjective         Objective    Physical Exam:        Vitals:  Blood pressure 168/72, pulse 82, temperature (!) 96 5 °F (35 8 °C), temperature source Tympanic, resp  rate 21, weight 71 kg (156 lb 8 4 oz), SpO2 94 %  Imaging and other studies: I have personally reviewed pertinent reports              Plan    Respiratory Plan: No distress/Pulmonary history  Airway Clearance Plan: Incentive Spirometer

## 2019-02-01 NOTE — ED PROVIDER NOTES
History  Chief Complaint   Patient presents with    Difficulty Swallowing     Arrives via medic  Per medic, patient started last night with difficulty swallowing   Per medic patient with large amounts sputum and rhonchii  No edema or JVD   on arrival states this started after patient ate steak tonight  Patient had duoneb x 1 and albuterol x 1  Patient with wet voice    Respiratory Distress     Patient was having a steak supper tonight about an hour prior to arrival when she started experiencing difficulty breathing and choking  She arrives with medics would given her breathing treatments and noted that her oxygenation improved  The patient denies chest pain and points to the throat at the thoracic inlet say she can feel something when she swallows  Patient is not able to tolerate her own secretions and is spitting up saliva on arrival             Prior to Admission Medications   Prescriptions Last Dose Informant Patient Reported? Taking?    Bromfenac Sodium (BROMSITE) 0 075 % SOLN  Self No No   Sig: Administer 1 drop to the right eye 2 (two) times a day   Multiple Vitamins-Minerals (PRESERVISION AREDS) CAPS  Self Yes No   Sig: Take by mouth   amLODIPine (NORVASC) 5 mg tablet   No No   Sig: Take 1 tablet (5 mg total) by mouth daily for 30 days   Patient taking differently: Take 5 mg by mouth every evening     amoxicillin (AMOXIL) 500 mg capsule  Self Yes No   Sig: Take 500 mg by mouth as needed     aspirin 81 MG tablet  Self Yes No   Sig: Take 81 mg by mouth 2 (two) times a day   gatifloxacin (ZYMAXID) 0 5 %  Self No No   Sig: Administer 1 drop to the right eye 2 (two) times a day   losartan-hydrochlorothiazide (HYZAAR) 100-25 MG per tablet  Self No No   Sig: Take 1 tablet by mouth daily   Patient taking differently: Take 1 tablet by mouth every morning     simvastatin (ZOCOR) 20 mg tablet   No No   Sig: Take 1 tablet (20 mg total) by mouth daily at bedtime      Facility-Administered Medications: None Past Medical History:   Diagnosis Date    Abnormal EKG     Aortic valve stenosis     Arthritis     CKD (chronic kidney disease), stage III (HCC)     Coronary artery disease     emergent PCI 11/18/13    Hypertension     Mitral and aortic regurgitation     Myocardial infarction ([address] ) 11/2013    with 1 stent       Past Surgical History:   Procedure Laterality Date    APPENDECTOMY      BILATERAL OOPHORECTOMY      CORONARY STENT PLACEMENT  2013    after MI (x1 stent)    DILATION AND CURETTAGE OF UTERUS      JOINT REPLACEMENT Left     knee    FL REMV CATARACT EXTRACAP,INSERT LENS Left 12/19/2016    Procedure: EXTRACTION EXTRACAPSULAR CATARACT PHACO INTRAOCULAR LENS (IOL); Surgeon: Nate Avelar MD;  Location: [facility] MAIN OR;  Service: Ophthalmology     [facility] [address] Right 6/18/2018    Procedure: EXTRACTION EXTRACAPSULAR CATARACT PHACO INTRAOCULAR LENS (IOL); Surgeon: Nate Avelar MD;  Location: [facility] MAIN OR;  Service: Ophthalmology    TONSILLECTOMY         Family History   Problem Relation Age of Onset    Heart disease Sister         enlarged heart     I have reviewed and agree with the history as documented  Social History   Substance Use Topics    Smoking status: Never Smoker    Smokeless tobacco: Never Used    Alcohol use Yes      Comment: drinks whiskey 2 x week        Review of Systems   Constitutional: Negative for chills and fever  HENT: Positive for trouble swallowing  Negative for congestion  Respiratory: Positive for cough, choking and shortness of breath  Cardiovascular: Negative for chest pain  Gastrointestinal: Positive for vomiting  Negative for abdominal pain  Genitourinary: Negative for dysuria  Musculoskeletal: Negative for arthralgias  Skin: Negative for rash  Neurological: Negative for weakness and headaches  Hematological: Does not bruise/bleed easily  Psychiatric/Behavioral: Negative for confusion     All other systems reviewed and are negative  Physical Exam  Physical Exam   Constitutional: She appears well-developed and well-nourished  HENT:   Head: Normocephalic and atraumatic  Mouth/Throat: Oropharynx is clear and moist    Eyes: Conjunctivae are normal    Neck: Normal range of motion  Neck supple  Cardiovascular: Normal rate, regular rhythm and normal heart sounds  Pulmonary/Chest: Effort normal    Patient has harsh transmitted breath sounds and rhonchi bilateral, improved with cough   Abdominal: Soft  There is no tenderness  Musculoskeletal: Normal range of motion  She exhibits edema  Neurological: She is alert  Skin: Skin is warm and dry  Psychiatric: She has a normal mood and affect  Her behavior is normal    Nursing note and vitals reviewed        Vital Signs  ED Triage Vitals   Temperature Pulse Respirations Blood Pressure SpO2   01/31/19 1921 01/31/19 1921 01/31/19 1921 01/31/19 1925 01/31/19 1921   (!) 96 7 °F (35 9 °C) 82 (!) 23 (!) 187/93 95 %      Temp Source Heart Rate Source Patient Position - Orthostatic VS BP Location FiO2 (%)   01/31/19 1921 01/31/19 1921 01/31/19 1921 01/31/19 1921 --   Tympanic Monitor Lying Right arm       Pain Score       --                  Vitals:    01/31/19 1921 01/31/19 1925 01/31/19 2015 01/31/19 2030   BP:  (!) 187/93     Pulse: 82  78 82   Patient Position - Orthostatic VS: Lying          Visual Acuity      ED Medications  Medications   ipratropium-albuterol (DUO-NEB) 0 5-2 5 mg/3 mL inhalation solution 3 mL (3 mL Nebulization Given 1/31/19 1938)   glucagon (GLUCAGEN) injection 1 mg (1 mg Intravenous Given 1/31/19 1940)       Diagnostic Studies  Results Reviewed     Procedure Component Value Units Date/Time    Comprehensive metabolic panel [[id number]]  (Abnormal) Collected:  01/31/19 1952    Lab Status:  Final result Specimen:  Blood from Arm, Left Updated:  01/31/19 2019     Sodium 141 mmol/L      Potassium 3 8 mmol/L      Chloride 104 mmol/L      CO2 25 mmol/L ANION GAP 12 mmol/L      BUN 25 mg/dL      Creatinine 1 10 mg/dL      Glucose 144 (H) mg/dL      Calcium 9 6 mg/dL      AST 9 U/L      ALT 14 U/L      Alkaline Phosphatase 88 U/L      Total Protein 7 1 g/dL      Albumin 3 2 (L) g/dL      Total Bilirubin 0 30 mg/dL      eGFR 42 ml/min/1 73sq m     Narrative:         National Kidney Disease Education Program recommendations are as follows:  GFR calculation is accurate only with a steady state creatinine  Chronic Kidney disease less than 60 ml/min/1 73 sq  meters  Kidney failure less than 15 ml/min/1 73 sq  meters      Troponin I [[id number]]  (Normal) Collected:  01/31/19 1952    Lab Status:  Final result Specimen:  Blood from Arm, Left Updated:  01/31/19 2019     Troponin I <0 02 ng/mL     Protime-INR [[id number]]  (Normal) Collected:  01/31/19 1952    Lab Status:  Final result Specimen:  Blood from Arm, Left Updated:  01/31/19 2013     Protime 10 4 seconds      INR 0 99    APTT [[id number]]  (Normal) Collected:  01/31/19 1952    Lab Status:  Final result Specimen:  Blood from Arm, Left Updated:  01/31/19 2013     PTT 29 seconds     CBC and differential [[id number]]  (Abnormal) Collected:  01/31/19 1952    Lab Status:  Final result Specimen:  Blood from Arm, Left Updated:  01/31/19 1959     WBC 14 81 (H) Thousand/uL      RBC 3 74 (L) Million/uL      Hemoglobin 11 6 g/dL      Hematocrit 37 3 %       (H) fL      MCH 31 0 pg      MCHC 31 1 (L) g/dL      RDW 12 7 %      MPV 10 4 fL      Platelets 778 Thousands/uL      nRBC 0 /100 WBCs      Neutrophils Relative 75 %      Immat GRANS % 1 %      Lymphocytes Relative 11 (L) %      Monocytes Relative 11 %      Eosinophils Relative 1 %      Basophils Relative 1 %      Neutrophils Absolute 11 27 (H) Thousands/µL      Immature Grans Absolute 0 12 Thousand/uL      Lymphocytes Absolute 1 68 Thousands/µL      Monocytes Absolute 1 57 (H) Thousand/µL      Eosinophils Absolute 0 10 Thousand/µL      Basophils Absolute 0 07 Thousands/µL                  XR chest 1 view portable    (Results Pending)              Procedures  ECG 12 Lead Documentation  Date/Time: 1/31/2019 7:23 PM  Performed by: Ki Granger  Authorized by: Ki Granger     Indications / Diagnosis:  SOB  ECG reviewed by me, the ED Provider: yes    Patient location:  ED  Interpretation:     Interpretation: abnormal    Rate:     ECG rate:  74    ECG rate assessment: normal    Rhythm:     Rhythm: sinus rhythm    Ectopy:     Ectopy: none    QRS:     QRS axis:  Normal    QRS intervals:  Normal  Conduction:     Conduction: normal    ST segments:     ST segments:  Normal  T waves:     T waves: normal    Other findings:     Other findings: poor R wave progression             Phone Contacts  ED Phone Contact    ED Course                               MDM  Number of Diagnoses or Management Options  Acute esophageal obstruction:   Diagnosis management comments: I used a [employer] suctioning for her oral secretions with much improvement  Have notify GI and will try glucagon 1st      Disposition  Final diagnoses:   Acute esophageal obstruction     Time reflects when diagnosis was documented in both MDM as applicable and the Disposition within this note     Time User Action Codes Description Comment    1/31/2019  7:58 PM Handy MIMS Add [K22 2] Acute esophageal obstruction       ED Disposition     None      Follow-up Information    None         Patient's Medications   Discharge Prescriptions    No medications on file     No discharge procedures on file      ED Provider  Electronically Signed by           Marguerite Mckeon MD  01/31/19 2050

## 2019-02-01 NOTE — ASSESSMENT & PLAN NOTE
Patient is s/p endoscopy for food impaction  Per GI, patient's O2 saturation dropped to high 80s on RA prior to procedure  Post procedure her oxygen saturation remained at 88%, 94% on 2L nasal cannula     Chest x-ray without any significant abnormality but patient may have aspirated  WBC was 14 81 on admission, subsequently worsened to 29K -> 17K -> 14K  Procalcitonin elevated concerning for bacterial infection  Clinically appears to be improving and without specific focal symptoms  Likely aspiration pneumonia   · CT of the chest revealed scattered nodules which may represent infectious process  · Treated with IV Zosyn with improvement in oxygenation and leukocytosis  · Swallow evaluation, no evidence of oropharyngeal dysphagia  · Tolerating diet without any difficulties  · Will discharge home on Augmentin to complete 7 days  ·

## 2019-02-02 ENCOUNTER — APPOINTMENT (INPATIENT)
Dept: RADIOLOGY | Facility: HOSPITAL | Age: 84
DRG: 177 | End: 2019-02-02
Payer: COMMERCIAL

## 2019-02-02 PROBLEM — D64.9 ANEMIA: Status: ACTIVE | Noted: 2019-02-02

## 2019-02-02 LAB
ANION GAP SERPL CALCULATED.3IONS-SCNC: 9 MMOL/L (ref 4–13)
ATRIAL RATE: 74 BPM
BASOPHILS # BLD AUTO: 0.03 THOUSANDS/ΜL (ref 0–0.1)
BASOPHILS NFR BLD AUTO: 0 % (ref 0–1)
BUN SERPL-MCNC: 25 MG/DL (ref 5–25)
CALCIUM SERPL-MCNC: 8.3 MG/DL (ref 8.3–10.1)
CHLORIDE SERPL-SCNC: 109 MMOL/L (ref 100–108)
CO2 SERPL-SCNC: 24 MMOL/L (ref 21–32)
CREAT SERPL-MCNC: 1.24 MG/DL (ref 0.6–1.3)
EOSINOPHIL # BLD AUTO: 0.05 THOUSAND/ΜL (ref 0–0.61)
EOSINOPHIL NFR BLD AUTO: 0 % (ref 0–6)
ERYTHROCYTE [DISTWIDTH] IN BLOOD BY AUTOMATED COUNT: 13.1 % (ref 11.6–15.1)
GFR SERPL CREATININE-BSD FRML MDRD: 37 ML/MIN/1.73SQ M
GLUCOSE P FAST SERPL-MCNC: 84 MG/DL (ref 65–99)
GLUCOSE SERPL-MCNC: 102 MG/DL (ref 65–140)
GLUCOSE SERPL-MCNC: 123 MG/DL (ref 65–140)
GLUCOSE SERPL-MCNC: 84 MG/DL (ref 65–140)
HCT VFR BLD AUTO: 26.4 % (ref 34.8–46.1)
HGB BLD-MCNC: 8.2 G/DL (ref 11.5–15.4)
IMM GRANULOCYTES # BLD AUTO: 0.09 THOUSAND/UL (ref 0–0.2)
IMM GRANULOCYTES NFR BLD AUTO: 1 % (ref 0–2)
LYMPHOCYTES # BLD AUTO: 1.1 THOUSANDS/ΜL (ref 0.6–4.47)
LYMPHOCYTES NFR BLD AUTO: 7 % (ref 14–44)
MCH RBC QN AUTO: 31.2 PG (ref 26.8–34.3)
MCHC RBC AUTO-ENTMCNC: 31.1 G/DL (ref 31.4–37.4)
MCV RBC AUTO: 100 FL (ref 82–98)
MONOCYTES # BLD AUTO: 1.65 THOUSAND/ΜL (ref 0.17–1.22)
MONOCYTES NFR BLD AUTO: 10 % (ref 4–12)
MRSA NOSE QL CULT: NORMAL
NEUTROPHILS # BLD AUTO: 14.12 THOUSANDS/ΜL (ref 1.85–7.62)
NEUTS SEG NFR BLD AUTO: 82 % (ref 43–75)
NRBC BLD AUTO-RTO: 0 /100 WBCS
P AXIS: 74 DEGREES
PLATELET # BLD AUTO: 158 THOUSANDS/UL (ref 149–390)
PMV BLD AUTO: 10.9 FL (ref 8.9–12.7)
POTASSIUM SERPL-SCNC: 3.5 MMOL/L (ref 3.5–5.3)
PR INTERVAL: 200 MS
PROCALCITONIN SERPL-MCNC: 4.23 NG/ML
QRS AXIS: 22 DEGREES
QRSD INTERVAL: 80 MS
QT INTERVAL: 356 MS
QTC INTERVAL: 395 MS
RBC # BLD AUTO: 2.63 MILLION/UL (ref 3.81–5.12)
SODIUM SERPL-SCNC: 142 MMOL/L (ref 136–145)
T WAVE AXIS: 48 DEGREES
VENTRICULAR RATE: 74 BPM
WBC # BLD AUTO: 17.04 THOUSAND/UL (ref 4.31–10.16)

## 2019-02-02 PROCEDURE — G8987 SELF CARE CURRENT STATUS: HCPCS

## 2019-02-02 PROCEDURE — 93010 ELECTROCARDIOGRAM REPORT: CPT | Performed by: INTERNAL MEDICINE

## 2019-02-02 PROCEDURE — 80048 BASIC METABOLIC PNL TOTAL CA: CPT | Performed by: INTERNAL MEDICINE

## 2019-02-02 PROCEDURE — 97167 OT EVAL HIGH COMPLEX 60 MIN: CPT

## 2019-02-02 PROCEDURE — 97535 SELF CARE MNGMENT TRAINING: CPT

## 2019-02-02 PROCEDURE — 82948 REAGENT STRIP/BLOOD GLUCOSE: CPT

## 2019-02-02 PROCEDURE — 70490 CT SOFT TISSUE NECK W/O DYE: CPT

## 2019-02-02 PROCEDURE — 85025 COMPLETE CBC W/AUTO DIFF WBC: CPT | Performed by: INTERNAL MEDICINE

## 2019-02-02 PROCEDURE — 99225 PR SBSQ OBSERVATION CARE/DAY 25 MINUTES: CPT | Performed by: INTERNAL MEDICINE

## 2019-02-02 PROCEDURE — G8988 SELF CARE GOAL STATUS: HCPCS

## 2019-02-02 PROCEDURE — 84145 PROCALCITONIN (PCT): CPT | Performed by: INTERNAL MEDICINE

## 2019-02-02 PROCEDURE — 71250 CT THORAX DX C-: CPT

## 2019-02-02 RX ADMIN — LOSARTAN POTASSIUM 25 MG: 25 TABLET, FILM COATED ORAL at 08:41

## 2019-02-02 RX ADMIN — ENOXAPARIN SODIUM 40 MG: 40 INJECTION SUBCUTANEOUS at 08:41

## 2019-02-02 RX ADMIN — PIPERACILLIN SODIUM,TAZOBACTAM SODIUM 2.25 G: 2; .25 INJECTION, POWDER, FOR SOLUTION INTRAVENOUS at 13:04

## 2019-02-02 RX ADMIN — PANTOPRAZOLE SODIUM 40 MG: 40 TABLET, DELAYED RELEASE ORAL at 05:58

## 2019-02-02 RX ADMIN — AMLODIPINE BESYLATE 5 MG: 5 TABLET ORAL at 17:10

## 2019-02-02 RX ADMIN — PIPERACILLIN SODIUM,TAZOBACTAM SODIUM 2.25 G: 2; .25 INJECTION, POWDER, FOR SOLUTION INTRAVENOUS at 08:45

## 2019-02-02 RX ADMIN — PIPERACILLIN SODIUM,TAZOBACTAM SODIUM 2.25 G: 2; .25 INJECTION, POWDER, FOR SOLUTION INTRAVENOUS at 21:35

## 2019-02-02 RX ADMIN — PIPERACILLIN SODIUM,TAZOBACTAM SODIUM 2.25 G: 2; .25 INJECTION, POWDER, FOR SOLUTION INTRAVENOUS at 01:35

## 2019-02-02 NOTE — OCCUPATIONAL THERAPY NOTE
OT EVALUATION/TREATMENT     02/02/19 1300   Note Type   Note type Eval/Treat   Restrictions/Precautions   Weight Bearing Precautions Per Order No   Other Precautions Chair Alarm; Bed Alarm; Fall Risk   Pain Assessment   Pain Assessment No/denies pain   Pain Score No Pain   Home Living   Type of Home House   Home Layout Able to live on main level with bedroom/bathroom   Bathroom Shower/Tub Tub/shower unit   Bathroom Equipment Shower chair;Grab bars in [facility];Walker  Reginold Sleek is kept in bathroom)   Prior Function   Level of [place] Independent with ADLs and functional mobility  (Uses single point cane')   Lives With [place] Help From Family   ADL Assistance Independent   IADLs Needs assistance   Vocational Retired   Comments Pt states that she is able to perform ADLs independently  Pt states that she cooks and performs medication management but hires someone to clean her home and no longer drives  Pt's  still drives and is able to go food shopping and drive pt to doctor's appointments  Subjective   Subjective "I'm feeling good "   ADL   Eating Assistance 5  Supervision/Setup   Grooming Assistance 5  Supervision/Setup   UB Bathing Assistance 5  Supervision/Setup   LB Bathing Assistance 4  Minimal Assistance   UB Dressing Assistance 5  Supervision/Setup   LB Dressing Assistance 4  Minimal Assistance   Toileting Assistance  4  Minimal Assistance   Bed Mobility   Supine to Sit 5  Supervision   Sit to Supine 5  Supervision   Transfers   Sit to Stand 4  Minimal assistance   Additional items Assist x 1   Stand to Sit 4  Minimal assistance   Additional items Assist x 1   Functional Mobility   Functional Mobility 4  Minimal assistance   Additional Comments Pt ambulated to bathroom with minimal assistance and verbal cues to keep walker close while ambulating     Balance   Static Sitting Fair +   Dynamic Sitting Fair   Static Standing Fair   Dynamic Standing Fair -   Activity Tolerance Activity Tolerance Patient tolerated treatment well   RUE Overall AROM   R Mass Grasp Full flexion all fingers, 5/5  RUE Strength   R Shoulder Flexion 3+/5   R Shoulder Extension 3+/5   R Elbow Flexion 4/5   R Elbow Extension 4/5   LUE Overall AROM   L Mass Grasp Full flexion all fingers, 5/5  LUE Strength   L Shoulder Flexion 3+/5   L Shoulder Extension 3+/5   L Elbow Flexion 4/5   L Elbow Extension 4/5   Hand Function   Gross Motor Coordination Functional   Fine Motor Coordination Functional   Cognition   Overall Cognitive Status WFL   Orientation Level Oriented X4   Assessment   Limitation Decreased ADL status; Decreased UE strength;Decreased Safe judgement during ADL;Decreased endurance;Decreased self-care trans;Decreased high-level ADLs   Prognosis Good   Assessment Patient evaluated by Occupational Therapy  Patient admitted with Aspiration pneumonia ([facility] [address] )  The patients occupational profile, medical and therapy history includes a extensive additional review of physical, cognitive, or psychosocial history related to current functional performance  Comorbidities affecting functional mobility and ADLS include: hypertension, coronary artery disease, vertigo, and arthritis  Prior to admission, patient was independent with functional mobility with single point cane, independent with ADLS and requiring assist for IADLS  The evaluation identifies the following performance deficits: weakness, impaired balance, decreased endurance, increased fall risk, new onset of impairment of functional mobility, decreased ADLS, decreased IADLS, decreased activity tolerance, decreased safety awareness and decreased strength, that result in activity limitations and/or participation restrictions   This evaluation requires clinical decision making of high complexity, because the patient presents with comorbidites that affect occupational performance and required significant modification of tasks or assistance with consideration of multiple treatment options  The Barthel Index was used as a functional outcome tool presenting with a score of 45, indicating marked limitations of functional mobility and ADLS  Patient will benefit from skilled Occupational Therapy services to address above deficits and facilitate a safe return to prior level of function  Goals   Patient Goals To go home   STG Time Frame (1-7 days)   Short Term Goal  Goals established to promote patient goal of going home:  Patient will increase standing tolerance to 3 minutes during ADL task to decrease assistance level and decrease fall risk; Patient will increase bed mobility to independent in preparation for ADLS and transfers; Patient will increase functional mobility to and from bathroom with rolling walker with supervision to increase performance with ADLS and to use a toilet; Patient will tolerate 5 minutes of UE ROM/strengthening to increase general activity tolerance and performance in ADLS/IADLS; Patient will improve functional activity tolerance to 10 minutes of sustained functional tasks to increase participation in basic self-care and decrease assistance level;  Patient will be able to to verbalize understanding and perform safe walker technique during ADLS and functional mobility at least 75% of the time with minimal cueing to decrease signs of fatigue and increase stamina to return to prior level of function; Patient will increase static sitting balance to good and dynamic sitting balance to fair+ to improve the ability to sit at edge of bed or on a chair for ADLS;  Patient will increase static standing balance to fair+ and dynamic standing balance to fair to improve postural stability and decrease fall risk during standing ADLS and transfers       LTG Time Frame (8-14 days)   Long Term Goal Goals established to promote patient goal of going home:  Patient will increase standing tolerance to 6 minutes during ADL task to decrease assistance level and decrease fall risk; Patient will increase functional mobility to and from bathroom with rolling walker independently to increase performance with ADLS and to use a toilet; Patient will tolerate 10 minutes of UE ROM/strengthening to increase general activity tolerance and performance in ADLS/IADLS; Patient will improve functional activity tolerance to 15 minutes of sustained functional tasks to increase participation in basic self-care and decrease assistance level;  Patient will be able to to verbalize understanding and perform safe walker technique during ADLS and functional mobility at least 75% of the time with no cueing to decrease signs of fatigue and increase stamina to return to prior level of function; Patient will increase static sitting balance to norml and dynamic sitting balance to good to improve the ability to sit at edge of bed or on a chair for ADLS;  Patient will increase static standing balance to good and dynamic standing balance to fair+ to improve postural stability and decrease fall risk during standing ADLS and transfers  Functional Transfer Goals   Pt Will Transfer To Toilet (STG supervision, LTG independent)   ADL Goals   Pt Will Perform Eating (STG independent)   Pt Will Perform Grooming (STG independent)   Pt Will Perform Bathing (STG supervision, LTG independent)   Pt Will Perform UE Dressing (STG independent)   Pt Will Perform LE Dressing (STG supervision, LTG independent)   Pt Will Perform Toileting (STG supervision, LTG independent)   Plan   Treatment Interventions ADL retraining;Functional transfer training;UE strengthening/ROM; Endurance training;Patient/family training;Equipment evaluation/education; Activityengagement   Goal Expiration Date 02/16/19   OT Frequency 3-5x/wk   Additional Treatment Session   Start Time 0130   End Time 0138   Treatment Assessment Pt tolerated treatment well and was willing to participate in all theraputic activities   When toileting pt benefitted from min a to transfer on/off toilet with verbal cues for hand placement on grab bars and walker  Pt ambulated to sink with min A and increased time and was able to stand at sink to wash hands with min A for balance and verbal cues for walker placement  Pt ambulated to back to bed with min A and cues to keep walker close to her  Pt verbalized understanding of safe and appropriate walker use but will benefit from reinforcement of strategies during functional activities  Pt will continue to benefit from skilled occupational therapy to address ADL independence and safety during functional transfers     Recommendation   OT Discharge Recommendation 24 hour supervision/assist   Barthel Index   Feeding 5   Bathing 0   Grooming Score 0   Dressing Score 5   Bladder Score 10   Bowels Score 10   Toilet Use Score 5   Transfers (Bed/Chair) Score 10   Mobility (Level Surface) Score 0   Stairs Score 0   Barthel Index Score 39   Licensure   [place] License Number  Amaris Barr, OTR/L [id number]

## 2019-02-02 NOTE — ASSESSMENT & PLAN NOTE
Patient presented with food impaction, history of intermittent dysphagia  Underwent emergent EGD on 1/31, noted to have significant for impaction, hiatal hernia and erosive esophagitis  No definitive obstruction    Likely Presbyesophagus  Recommended PPI for 2 months and follow up with GI for repeat EGD

## 2019-02-02 NOTE — PROGRESS NOTES
[facility] Progress Note  Patient: Emeka Seen 80 y o  female   MRN: [id number]  PCP: Olga Peck DO  Unit/Bed#: [address] [place] [address] Encounter: [id number]  Date Of Visit: 02/02/19    Problem List:    Principal Problem:    Aspiration pneumonia ([address] )  Active Problems:    Anemia    CAD (coronary artery disease)    Hypertension    Dyslipidemia      Assessment & Plan:    * Aspiration pneumonia [facility])   Assessment & Plan    Patient is s/p endoscopy for food impaction  Per GI, patient's O2 saturation dropped to high 80s on RA prior to procedure  Post procedure her oxygen saturation remained at 88%, 94% on 2L nasal cannula  Chest x-ray without any significant abnormality but patient may have aspirated  WBC was 14 81 on admission, subsequently worsened to 29K -> 17K  Clinically appears to be improving and without specific focal symptoms but the rising procalcitonin is concerning  Likely aspiration pneumonia versus pneumonitis   · Continue IV Zosyn  · Check CT chest  · Respiratory protocol, incentive spirometry  · Trend procalcitonin  · Monitor symptoms  · Swallow evaluation, recommended regular diet     Anemia   Assessment & Plan    Prior baseline around 10 5 (4/17)  Slowly declining from 11 6-8 2 today  Macrocytic  No evidence of overt bleeding, BUN is down trending  Hold aspirin, continue PPI  Check B12 folate TSH  Monitor           Food impaction of esophagusresolved as of 1/31/2019   Assessment & Plan    Patient presented with food impaction, history of intermittent dysphagia  Underwent emergent EGD on 1/31, noted to have significant for impaction, hiatal hernia and erosive esophagitis  No definitive obstruction    Likely Presbyesophagus  Recommended PPI for 2 months and follow up with GI for repeat EGD       CAD (coronary artery disease)   Assessment & Plan    Continue antihypertensives and Zocor  Continue to hold aspirin today due to declining hemoglobin     Dyslipidemia   Assessment & Plan    Resume Zocor     Hypertension   Assessment & Plan    Continue Norvasc and losartan  Monitor           VTE Pharmacologic Prophylaxis:   Pharmacologic: Enoxaparin (Lovenox)  Mechanical VTE Prophylaxis in Place: Yes    Patient Centered Rounds: I have performed bedside rounds with nursing staff today  Discussions with Specialists or Other Care Team Provider: Yes    Education and Discussions with Family / Patient:Yes  at bedside    Time Spent for Care: 45 minutes  More than 50% of total time spent on counseling and coordination of care as described above  Current Length of Stay: 0 day(s)    Current Patient Status: Inpatient     Discharge Plan:  Home when clinically improved    Code Status: Level 3 - DNAR and DNI    Certification Statement: The patient will continue to require additional inpatient hospital stay due to Possible aspiration pneumonia/pneumonitis,   worsening of procalcitonin, declining hemoglobin      Subjective:   Remains with leukocytosis though improving since yesterday  Procalcitonin is up trending and hemoglobin is trending down  Denies any difficulty or pain in swallowing, sore throat is improving  Denies any chest pain, shortness of breath or cough  Do tolerating diet  Denies any nausea vomiting abdominal pain  Reports that her strength is improving      Objective:   Comfortable      Negative for Chest Pain, Palpitations, Shortness of Breath, Abdominal Pain, Nausea, Vomiting, Constipation, Diarrhea, Dizziness  All other 10 review of systems negative and without drastic interval changes from yesterday  Vitals:   Temp (24hrs), Av 7 °F (37 1 °C), Min:98 °F (36 7 °C), Max:99 4 °F (37 4 °C)    Temp:  [98 °F (36 7 °C)-99 4 °F (37 4 °C)] 98 °F (36 7 °C)  HR:  [73-86] 86  Resp:  [17-18] 17  BP: (116-153)/(55-67) 153/67  SpO2:  [92 %-95 %] 93 % on room air  Body mass index is 26 87 kg/m²  Input and Output Summary (last 24 hours):        Intake/Output Summary (Last 24 hours) at 19 1120  Last data filed at 02/02/19 0135   Gross per 24 hour   Intake                0 ml   Output              450 ml   Net             -450 ml       Physical Exam:     Physical Exam   Constitutional: She is oriented to person, place, and time  She appears well-developed  No distress  HENT:   Head: Normocephalic and atraumatic  Nose: Nose normal    Mouth/Throat: No oropharyngeal exudate  Eyes: Pupils are equal, round, and reactive to light  Conjunctivae are normal    Neck: Normal range of motion  Neck supple  No tracheal tenderness present  No edema and no erythema present  No crepitus   Cardiovascular: Normal rate, regular rhythm and normal heart sounds  Pulmonary/Chest: Effort normal  No accessory muscle usage or stridor  Tachypnea noted  No respiratory distress  She has decreased breath sounds  She has no wheezes  She has no rhonchi  She has no rales  Abdominal: Soft  Bowel sounds are normal  She exhibits no distension  There is no tenderness  There is no rebound and no guarding  Musculoskeletal: Normal range of motion  She exhibits no edema  Neurological: She is alert and oriented to person, place, and time  No cranial nerve deficit  Skin: Skin is warm and dry  No rash noted  Additional Data:     Labs:      Results from last 7 days  Lab Units 02/02/19  0554   WBC Thousand/uL 17 04*   HEMOGLOBIN g/dL 8 2*   HEMATOCRIT % 26 4*   PLATELETS Thousands/uL 158   NEUTROS PCT % 82*   LYMPHS PCT % 7*   MONOS PCT % 10   EOS PCT % 0       Results from last 7 days  Lab Units 02/02/19  0554  01/31/19 1952   POTASSIUM mmol/L 3 5  < > 3 8   CHLORIDE mmol/L 109*  < > 104   CO2 mmol/L 24  < > 25   BUN mg/dL 25  < > 25   CREATININE mg/dL 1 24  < > 1 10   CALCIUM mg/dL 8 3  < > 9 6   ALK PHOS U/L  --   --  88   ALT U/L  --   --  14   AST U/L  --   --  9   < > = values in this interval not displayed      Results from last 7 days  Lab Units 01/31/19 1952   INR  0 99       * I Have Reviewed All Lab Data Listed Above   * Additional Pertinent Lab Tests Reviewed: All Labs For Current Hospital Admission Reviewed    Imaging:  Xr Chest 1 View Portable    Result Date: 2/1/2019  Narrative: CHEST INDICATION:   sob  Difficulty swallowing  Productive cough  COMPARISON:  April 16, 2017 EXAM PERFORMED/VIEWS:  XR CHEST PORTABLE FINDINGS: Top normal cardiac size  Pulmonary vasculature within normal range  Calcified plaque within the arch  The lungs are clear  No pneumothorax or pleural effusion  Degenerative changes in severe subacromial narrowing on the right  Stable compared to prior  Impression: No acute cardiopulmonary disease  Workstation performed: [id number]     X-ray Chest 1 View    Result Date: 2/1/2019  Narrative: CHEST INDICATION:   rule out aspiration, food bolus  Difficulty swallowing  COMPARISON:  1/31/2019 EXAM PERFORMED/VIEWS:  XR CHEST 1 VIEW FINDINGS: Cardiomediastinal silhouette appears unremarkable  The lungs are clear  No pneumothorax or pleural effusion  Osseous structures appear within normal limits for patient age  Impression: No acute cardiopulmonary disease   Workstation performed: [id number]     Imaging Reports Reviewed by myself    Cultures:   Blood Culture:   Lab Results   Component Value Date    BLOODCX No Growth at 24 hrs  02/01/2019    BLOODCX No Growth at 24 hrs  02/01/2019     Urine Culture: No results found for: URINECX  Sputum Culture: No components found for: SPUTUMCX  Wound Culture: No results found for: WOUNDCULT    Last 24 Hours Medication List:     Current Facility-Administered Medications:  acetaminophen 650 mg Oral Q6H PRN Fabian Lovett MD    amLODIPine 5 mg Oral QPM Fabian Lovett MD    diphenhydrAMINE 12 5 mg Intravenous Once PRN Bri Cornejo MD    losartan 25 mg Oral Daily Fabian Lovett MD    ondansetron 4 mg Intravenous Once PRN Bri Cornejo MD    pantoprazole 40 mg Oral Early Morning Fabian Lovett MD    phenol 1 spray Mouth/Throat Q2H PRN Fabian Lovett MD piperacillin-tazobactam 2 25 g Intravenous Q6H Adalberto De MD Last Rate: 2 25 g (02/02/19 0211)        Today, Patient Was Seen By: Adalberto De MD    ** Please Note: "This note has been constructed using a voice recognition system  Therefore there may be syntax, spelling, and/or grammatical errors   Please call if you have any questions  "**

## 2019-02-03 VITALS
RESPIRATION RATE: 18 BRPM | OXYGEN SATURATION: 97 % | HEART RATE: 87 BPM | TEMPERATURE: 99.2 F | DIASTOLIC BLOOD PRESSURE: 73 MMHG | BODY MASS INDEX: 26.72 KG/M2 | SYSTOLIC BLOOD PRESSURE: 163 MMHG | HEIGHT: 64 IN | WEIGHT: 156.53 LBS

## 2019-02-03 PROBLEM — J96.01 ACUTE RESPIRATORY FAILURE WITH HYPOXIA (HCC): Status: RESOLVED | Noted: 2019-02-03 | Resolved: 2019-02-03

## 2019-02-03 PROBLEM — J96.01 ACUTE RESPIRATORY FAILURE WITH HYPOXIA (HCC): Status: ACTIVE | Noted: 2019-02-03

## 2019-02-03 LAB
ANION GAP SERPL CALCULATED.3IONS-SCNC: 7 MMOL/L (ref 4–13)
BASOPHILS # BLD AUTO: 0.05 THOUSANDS/ΜL (ref 0–0.1)
BASOPHILS NFR BLD AUTO: 0 % (ref 0–1)
BUN SERPL-MCNC: 19 MG/DL (ref 5–25)
CALCIUM SERPL-MCNC: 8.5 MG/DL (ref 8.3–10.1)
CHLORIDE SERPL-SCNC: 108 MMOL/L (ref 100–108)
CO2 SERPL-SCNC: 27 MMOL/L (ref 21–32)
CREAT SERPL-MCNC: 1.17 MG/DL (ref 0.6–1.3)
EOSINOPHIL # BLD AUTO: 0.07 THOUSAND/ΜL (ref 0–0.61)
EOSINOPHIL NFR BLD AUTO: 1 % (ref 0–6)
ERYTHROCYTE [DISTWIDTH] IN BLOOD BY AUTOMATED COUNT: 13.1 % (ref 11.6–15.1)
FOLATE SERPL-MCNC: 8.1 NG/ML (ref 3.1–17.5)
GFR SERPL CREATININE-BSD FRML MDRD: 39 ML/MIN/1.73SQ M
GLUCOSE SERPL-MCNC: 80 MG/DL (ref 65–140)
HCT VFR BLD AUTO: 28.3 % (ref 34.8–46.1)
HGB BLD-MCNC: 8.7 G/DL (ref 11.5–15.4)
IMM GRANULOCYTES # BLD AUTO: 0.09 THOUSAND/UL (ref 0–0.2)
IMM GRANULOCYTES NFR BLD AUTO: 1 % (ref 0–2)
LYMPHOCYTES # BLD AUTO: 1.04 THOUSANDS/ΜL (ref 0.6–4.47)
LYMPHOCYTES NFR BLD AUTO: 7 % (ref 14–44)
MCH RBC QN AUTO: 30.9 PG (ref 26.8–34.3)
MCHC RBC AUTO-ENTMCNC: 30.7 G/DL (ref 31.4–37.4)
MCV RBC AUTO: 100 FL (ref 82–98)
MONOCYTES # BLD AUTO: 1.74 THOUSAND/ΜL (ref 0.17–1.22)
MONOCYTES NFR BLD AUTO: 12 % (ref 4–12)
NEUTROPHILS # BLD AUTO: 11.55 THOUSANDS/ΜL (ref 1.85–7.62)
NEUTS SEG NFR BLD AUTO: 79 % (ref 43–75)
NRBC BLD AUTO-RTO: 0 /100 WBCS
PLATELET # BLD AUTO: 163 THOUSANDS/UL (ref 149–390)
PMV BLD AUTO: 11.1 FL (ref 8.9–12.7)
POTASSIUM SERPL-SCNC: 3.8 MMOL/L (ref 3.5–5.3)
PROCALCITONIN SERPL-MCNC: 2.02 NG/ML
RBC # BLD AUTO: 2.82 MILLION/UL (ref 3.81–5.12)
SODIUM SERPL-SCNC: 142 MMOL/L (ref 136–145)
TSH SERPL DL<=0.05 MIU/L-ACNC: 1.68 UIU/ML (ref 0.36–3.74)
VIT B12 SERPL-MCNC: 498 PG/ML (ref 100–900)
WBC # BLD AUTO: 14.54 THOUSAND/UL (ref 4.31–10.16)

## 2019-02-03 PROCEDURE — 85025 COMPLETE CBC W/AUTO DIFF WBC: CPT | Performed by: INTERNAL MEDICINE

## 2019-02-03 PROCEDURE — 84145 PROCALCITONIN (PCT): CPT | Performed by: INTERNAL MEDICINE

## 2019-02-03 PROCEDURE — 97530 THERAPEUTIC ACTIVITIES: CPT

## 2019-02-03 PROCEDURE — 80048 BASIC METABOLIC PNL TOTAL CA: CPT | Performed by: INTERNAL MEDICINE

## 2019-02-03 PROCEDURE — 99239 HOSP IP/OBS DSCHRG MGMT >30: CPT | Performed by: INTERNAL MEDICINE

## 2019-02-03 PROCEDURE — 82607 VITAMIN B-12: CPT | Performed by: INTERNAL MEDICINE

## 2019-02-03 PROCEDURE — 97535 SELF CARE MNGMENT TRAINING: CPT

## 2019-02-03 PROCEDURE — 82746 ASSAY OF FOLIC ACID SERUM: CPT | Performed by: INTERNAL MEDICINE

## 2019-02-03 PROCEDURE — 84443 ASSAY THYROID STIM HORMONE: CPT | Performed by: INTERNAL MEDICINE

## 2019-02-03 RX ORDER — AMOXICILLIN AND CLAVULANATE POTASSIUM 500; 125 MG/1; MG/1
1 TABLET, FILM COATED ORAL EVERY 12 HOURS SCHEDULED
Qty: 8 TABLET | Refills: 0 | Status: SHIPPED | OUTPATIENT
Start: 2019-02-03 | End: 2019-02-07

## 2019-02-03 RX ADMIN — PANTOPRAZOLE SODIUM 40 MG: 40 TABLET, DELAYED RELEASE ORAL at 05:26

## 2019-02-03 RX ADMIN — PIPERACILLIN SODIUM,TAZOBACTAM SODIUM 2.25 G: 2; .25 INJECTION, POWDER, FOR SOLUTION INTRAVENOUS at 13:34

## 2019-02-03 RX ADMIN — PIPERACILLIN SODIUM,TAZOBACTAM SODIUM 2.25 G: 2; .25 INJECTION, POWDER, FOR SOLUTION INTRAVENOUS at 01:51

## 2019-02-03 RX ADMIN — LOSARTAN POTASSIUM 25 MG: 25 TABLET, FILM COATED ORAL at 09:43

## 2019-02-03 RX ADMIN — PIPERACILLIN SODIUM,TAZOBACTAM SODIUM 2.25 G: 2; .25 INJECTION, POWDER, FOR SOLUTION INTRAVENOUS at 07:51

## 2019-02-03 NOTE — NURSING NOTE
Discharge instructions discussed with patient and   Patient left 4N stable, all personal belongings at side  Family notified of patient discharge, awaiting at home

## 2019-02-03 NOTE — UTILIZATION REVIEW
Initial Clinical Review     Admission: Date/Time/Statement:  OBSERVATION 1/31/19 @ 2341, CONVERTED TO INPATIENT ADMISSION 2/2/19 @ 1120 FOR CONTINUED CARE & TX FOR ASPIRATION PNEUMONITIS, REQUIRING CONTINUED IVABT  PT WITH WORSENING  PROCALCITONIN, DROPPING HGB  Scheduled for CT chest & neck  02/02/19 1120 Release Huyen Bello MD (auto-released) Sonali Beavers   02/02/19 1120 Complete Huyen Bello MD    Inpatient Admission   Admitting Physician [facility]    Level of Care Med Surg    Estimated length of stay More than 2 Midnights    Certification I certify that inpatient services are medically necessary for this patient for a duration of greater than two midnights  See H&P and MD Progress Notes for additional information about the patient's course of treatment        ED: Date/Time/Mode of Arrival:             ED Arrival Information      Expected Arrival Acuity Means of Arrival Escorted By Service Admission Type     - 1/31/2019 19:14 Emergent [place] [id number] Medicine Emergency     Arrival Complaint     respiratory          Chief Complaint:        Chief Complaint   Patient presents with    Difficulty Swallowing       Arrives via medic  Per medic, patient started last night with difficulty swallowing   Per medic patient with large amounts sputum and rhonchii  No edema or JVD   on arrival states this started after patient ate steak tonight  Patient had duoneb x 1 and albuterol x 1   Patient with wet voice    Respiratory Distress      History of Illness:   ED Vital Signs:            ED Triage Vitals   Temperature Pulse Respirations Blood Pressure SpO2   01/31/19 1921 01/31/19 1921 01/31/19 1921 01/31/19 1925 01/31/19 1921   (!) 96 7 °F (35 9 °C) 82 (!) 23 (!) 187/93 95 %       Temp Source Heart Rate Source Patient Position - Orthostatic VS BP Location FiO2 (%)   01/31/19 1921 01/31/19 1921 01/31/19 1921 01/31/19 1921 --   Tympanic Monitor Lying Right arm       Pain Score           02/01/19 5687           No Pain                Wt Readings from Last 1 Encounters:   01/31/19 71 kg (156 lb 8 4 oz)      Vital Signs (abnormal): SATS 88%  Pertinent Labs/Diagnostic Test Results:    ALB 3 2 TROPONIN <0 02 WBC 14 81>29 39 H/H 9/29 3   Procalcitonin <=0 25 ng/ml 0 35   CXR No acute cardiopulmonary disease  2/2/19  WBC 17 04 HGB 8 2  GFR 37   Procalcitonin <=0 25 ng/ml 4 23     CT CHEST=1  Scattered sub-4 mm groundglass nodules in both lungs and right middle lobe centrilobular micronodules  may represent atypical infectious process  2   There is also a 6 mm solid nodule in the right middle lobe  Sequelae of chronic granulomatous process in the mediastinum, liver, and spleen  Decision of whether to follow up nodules nodules with CT in 12 months should be based on consideration of   patient age and comorbidities  3   Small bilateral pleural effusions  CT SOFT TISSUE NECK=No adenopathy or mass  No evidence of airway compromise  Pleural thickening and apical scarring noted at the lung apices  Punctate calcifications within the pleura on the left, 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  Subtle nodular opacities identified in the right upper lobe  See separate CT chest report    ED Treatment:              Medication Administration from 01/31/2019 1914 to 02/01/2019 0012        Date/Time Order Dose Route Action Action by Comments       01/31/2019 1938 ipratropium-albuterol (DUO-NEB) 0 5-2 5 mg/3 mL inhalation solution 3 mL 3 mL Nebulization Given Katharina Camilo RN         01/31/2019 1940 glucagon (GLUCAGEN) injection 1 mg 1 mg Intravenous Given Katharina Camilo RN            Past Medical/Surgical History:   Past Medical History:   Diagnosis Date    Abnormal EKG      Aortic valve stenosis      Arthritis      CKD (chronic kidney disease), stage III (HCC)      Coronary artery disease      Hypertension      Mitral and aortic regurgitation      Myocardial infarction [facility]) 11/2013      Admitting Diagnosis: Trouble swallowing [R13 10]  Acute esophageal obstruction [K22 2]  Food impaction of esophagus, initial encounter [T18 128A]  Age/Sex: 80 y o  female  Assessment/Plan:   Aspiration pneumonia ([address] )   Assessment & Plan     Patient is s/p endoscopy for food impaction  Per GI, patient's O2 saturation dropped to high 80s on RA prior to procedure  Post procedure her oxygen saturation remained at 88%, 94% on 2L nasal cannula  Chest x-ray without any significant abnormality but patient may have aspirated    WBC was 14 81 on admission, continues to worsen  Likely aspiration pneumonia versus pneumonitis   · Will change antibiotics to Zosyn  · Respiratory protocol, incentive spirometry  · Check procalcitonin  · Monitor respiratory symptoms  · Swallow evaluation     GI CONSULT    Dysphagia with food impaction:  She appears to have intermittent dysphagia symptoms and now has her first food impaction   This could be due to a peptic stricture, Schatzki ring, reflux esophagitis, or malignancy   We will plan for an urgent upper endoscopy to evaluate and attempt to remove the food impaction      EGD= Food impaction  Hiatal hernia  Erosive reflux esophagitis   PER MD 2/2/19:  Remains with leukocytosis   IVABT changed to Zosyn  Procalcitonin is up trending and hemoglobin is trending down  Certification Statement: The patient will continue to require additional inpatient hospital stay due to Possible aspiration pneumonia/pneumonitis,   worsening of procalcitonin, declining hemoglobin  Admission Orders:  MED SURG  ELEVATE HOB 30 DEGREE  IS  ASPIRATION PRECAUTIONS  VENODYNES  PT/OT/ST EVAL & TX  Scheduled Meds:  Current Facility-Administered Medications:  acetaminophen 650 mg Oral Q6H PRN Sheeba Collado MD    amLODIPine 5 mg Oral QPM Sheeba Collado MD    diphenhydrAMINE 12 5 mg Intravenous Once PRN Fam Rhoades MD    losartan 25 mg Oral Daily Sheeba Collado MD ondansetron 4 mg Intravenous Once PRN R Trang Hairston MD    pantoprazole 40 mg Oral Early Morning Ceasar Rubinstein, MD    phenol 1 spray Mouth/Throat Q2H PRN Ceasar Rubinstein, MD    piperacillin-tazobactam 2 25 g Intravenous Q6H Ceasar Rubinstein, MD Last Rate: 2 25 g (02/03/19 0751)     Continuous Infusions:   PRN Meds:   acetaminophen    diphenhydrAMINE    ondansetron    phenol  Network Utilization Review Department  Phone: [phone]; Fax [phone]  [email or web address]  ATTENTION: Please call with any questions or concerns to [phone]  and carefully listen to the prompts so that you are directed to the right person  Send all requests for admission clinical reviews, approved or denied determinations and any other requests to fax [phone]   All voicemails are confidential

## 2019-02-03 NOTE — PLAN OF CARE
Problem: OCCUPATIONAL THERAPY ADULT  Goal: Performs self-care activities at highest level of function for planned discharge setting  See evaluation for individualized goals  Outcome: Progressing  Limitation: Decreased ADL status, Decreased UE strength, Decreased Safe judgement during ADL, Decreased endurance, Decreased self-care trans, Decreased high-level ADLs  Prognosis: Good  Assessment: Pt tolerated treatment well and was willing to participate in all theraputic activities  Stated she had just walked with physical therapy  Pt able to dress with supervision for balance, OT emphasized having another person present when she desses at home for safety and keeping assistive device within reach for balance  Pt verbalized understanding  Increased time for all dressing tasks  Pt ambulated 25 feet in hallway with min A from OT  Pt will continue to benefit from skilled occupational therapy services to address independence and safety with ADLs and safety with functional transfsers       OT Discharge Recommendation: 24 hour supervision/assist

## 2019-02-03 NOTE — OCCUPATIONAL THERAPY NOTE
OT TREATMENT       02/03/19 1515   Restrictions/Precautions   Other Precautions Fall Risk   General   Family/Caregiver Present Yes, significant other   Pain Assessment   Pain Assessment No/denies pain   Pain Score No Pain   ADL   UB Dressing Assistance 4  Minimal Assistance   UB Dressing Deficit Setup;Supervision/safety   UB Dressing Comments Pt benefitted from assistance to manage clasp on bra  LB Dressing Assistance 5  Supervision/Setup   LB Dressing Deficit Setup;Steadying;Verbal cueing;Supervision/safety; Requires assistive device for steadying   LB Dressing Comments Pt benefitted from vcs to have assistive device nearby when dressing to assist with balance and confirmed that someone would be with the pt 24 hours a day at home  OT exmphasized that pt should have someone with her while she dresses to ensure safety and monitor balance during activity  Pt benefitted from setup and supervision to thread underwear and pull over hips  Pt able to stand with supervision to don skirt overhead  Pt able to lean forward with supervision to don socks  Pt with increased time to complete all dressing activities  Functional Standing Tolerance   Time 1 minute x 2   Activity Donned underwear and then skirt with rest break in between  Comments Supervision and RW  Transfers   Sit to Stand 5  Supervision   Additional items Verbal cues  (Rolling walker)   Stand to Sit 5  Supervision   Additional items Verbal cues  (Rolling walker)   Functional Mobility   Functional Mobility 4  Minimal assistance   Additional Comments Pt ambulated 25 feet with min A, stated she had also just walked with physical therapy  Activity Tolerance   Activity Tolerance Patient tolerated treatment well   Medical Staff Made Aware Yes, Elizabeth   Assessment   Assessment Pt tolerated treatment well and was willing to participate in all theraputic activities  Stated she had just walked with physical therapy   Pt able to dress with supervision for balance, OT emphasized having another person present when she desses at home for safety and keeping assistive device within reach for balance  Pt verbalized understanding  Increased time for all dressing tasks  Pt ambulated 25 feet in hallway with min A from OT  Pt will continue to benefit from skilled occupational therapy services to address independence and safety with ADLs and safety with functional transfsers  Plan   Treatment Interventions ADL retraining;Functional transfer training; Endurance training; Activityengagement   Recommendation   OT Discharge Recommendation 24 hour supervision/assist   Licensure   [place] License Number  Latasha Kay [address], OTR/L [id number]

## 2019-02-03 NOTE — ASSESSMENT & PLAN NOTE
Prior baseline around 10 5 (4/17)  Slowly declining from 11 6-8 2 today, subsequently remained stable  Macrocytic  No evidence of overt bleeding, BUN is down trending   continue PPI  B12 folate TSH within normal limit  Follow-up CBC as outpatient  Follow up with PMD [place]

## 2019-02-03 NOTE — PLAN OF CARE
Problem: PHYSICAL THERAPY ADULT  Goal: Performs mobility at highest level of function for planned discharge setting  See evaluation for individualized goals  Treatment/Interventions: ADL retraining, Functional transfer training, LE strengthening/ROM, Elevations, Therapeutic exercise, Cognitive reorientation, Equipment eval/education, Bed mobility, Gait training, Patient/family training Tal Baird texted)  Equipment Recommended:  (pt has a cane and a walker)       See flowsheet documentation for full assessment, interventions and recommendations  Outcome: Progressing  Prognosis: Good  Problem List: Decreased strength, Decreased endurance, Impaired balance, Decreased mobility  Assessment: Pt's  present for therapy  Pt unable to ambulate with a cane as she usually does  Pt ambulated with rolling walker without loss of balance  Pt fatigues quickly  Recommend pt had 24 hr S/A for the first day going home (not her 80year old ), pt//MD aware  Recommendation: 24 hour supervision/assist, Home PT          See flowsheet documentation for full assessment

## 2019-02-03 NOTE — INCIDENTAL FINDINGS
The following findings require follow up:  Radiographic finding   Findin mm solid nodule in the right middle lobe   Sequelae of chronic granulomatous process in the mediastinum, liver, and spleen   Follow up required:  As per patient and family decision   Follow up should be done within 6-12 month(s)    Please notify the following clinician to assist with the follow up:   Dr Jackelyn Gallo, DO

## 2019-02-03 NOTE — DISCHARGE SUMMARY
Discharge Summary - [address] [facility]    Patient Information: Faraz Franco 80 y o  female MRN: [id number]  Unit/Bed#: [address] [place] [address] Encounter: [id number]    Discharging Physician / Practitioner: Reina Ascencio MD  PCP: Celeste Coppola DO  Admission Date: 1/31/2019  Discharge Date: 02/03/19    Reason for Admission: Difficulty Swallowing (Arrives via medic  Per medic, patient started last night with difficulty swallowing   Per medic patient with large amounts sputum and rhonchii  No edema or JVD   on arrival states this started after patient ate steak tonight  Patient had duoneb x 1 and albuterol x 1  Patient with wet voice) and Respiratory Distress      Discharge Diagnoses:     Principal Problem:    Aspiration pneumonia ([address] )  Active Problems:    CAD (coronary artery disease)    Anemia    Hypertension    Dyslipidemia  Resolved Problems:    Food impaction of esophagus    Acute respiratory failure with hypoxia (HCC)        * Aspiration pneumonia [facility])   Assessment & Plan    Patient is s/p endoscopy for food impaction  Per GI, patient's O2 saturation dropped to high 80s on RA prior to procedure  Post procedure her oxygen saturation remained at 88%, 94% on 2L nasal cannula     Chest x-ray without any significant abnormality but patient may have aspirated  WBC was 14 81 on admission, subsequently worsened to 29K -> 17K -> 14K  Procalcitonin elevated concerning for bacterial infection  Clinically appears to be improving and without specific focal symptoms  Likely aspiration pneumonia   · CT of the chest revealed scattered nodules which may represent infectious process  · Treated with IV Zosyn with improvement in oxygenation and leukocytosis  · Swallow evaluation, no evidence of oropharyngeal dysphagia  · Tolerating diet without any difficulties  · Will discharge home on Augmentin to complete 7 days  ·      Acute respiratory failure with hypoxia (HCC)resolved as of 2/3/2019   Assessment & Plan Secondary to above  Currently saturating 95-97% on room air   Now resolved     Food impaction of esophagusresolved as of 1/31/2019   Assessment & Plan    Patient presented with food impaction, history of intermittent dysphagia  Underwent emergent EGD on 1/31, noted to have significant for impaction, hiatal hernia and erosive esophagitis  No definitive obstruction  Likely Presbyesophagus  Recommended PPI for 2 months and follow up with GI for repeat EGD       Anemia   Assessment & Plan    Prior baseline around 10 5 (4/17)  Slowly declining from 11 6-8 2 today, subsequently remained stable  Macrocytic  No evidence of overt bleeding, BUN is down trending   continue PPI  B12 folate TSH within normal limit  Follow-up CBC as outpatient  Follow up with PMD           CAD (coronary artery disease)   Assessment & Plan    Continue antihypertensives and Zocor     Dyslipidemia   Assessment & Plan    CONTINUE Zocor     Hypertension   Assessment & Plan    Continue home medication         Consultations During Hospital Stay:  IP CONSULT TO GASTROENTEROLOGY    Procedures Performed:     · Procedure(s):  · REMOVAL FOREIGN BODY ESOPHAGUS  IMPRESSIONS:       Food impaction  Hiatal hernia  Erosive reflux esophagitis     RECOMMENDATIONS:      1  Omeprazole 40 mg p o  Daily for two months  2  Repeat upper endoscopy in two months to document healing of the erosive reflux esophagitis and take biopsies if any residual abnormalities are found  It would also be an opportunity to evaluate the rest of the stomach which could not be visualized due to the large amount of food and liquid in the stomach  Significant Findings:     · See above    Imaging while in hospital:    Xr Chest 1 View Portable    Result Date: 2/1/2019  Narrative: CHEST INDICATION:   sob  Difficulty swallowing  Productive cough  COMPARISON:  April 16, 2017 EXAM PERFORMED/VIEWS:  XR CHEST PORTABLE FINDINGS: Top normal cardiac size    Pulmonary vasculature within normal range  Calcified plaque within the arch  The lungs are clear  No pneumothorax or pleural effusion  Degenerative changes in severe subacromial narrowing on the right  Stable compared to prior  Impression: No acute cardiopulmonary disease  Workstation performed: [id number]     Ct Soft Tissue Neck Wo Contrast    Result Date: 2/2/2019  Narrative: CT SOFT TISSUE NECK WITHOUT CONTRAST INDICATION:   SIRS post food impaction and EGD, rule out aspiration or other intrathoracic abnormality  ?????? Sore throat but without significant respiratory symptoms  COMPARISON:  None  TECHNIQUE:  Axial, sagittal, and coronal 2D reformatted images were created from the axial source data and submitted for interpretation  Radiation dose length product (DLP) for this visit:  458 5 mGy-cm   This examination, like all CT scans performed in the [facility], was performed utilizing techniques to minimize radiation dose exposure, including the use of iterative reconstruction and automated exposure control  IMAGE QUALITY:  Diagnostic  FINDINGS: VISUALIZED BRAIN PARENCHYMA:  Normal visualized brain parenchyma  Vascular calcification of the intracranial vasculature noted especially distal left vertebral artery extending to the vertebrobasilar junction  VISUALIZED ORBITS AND PARANASAL SINUSES:  Normal  NASAL CAVITY AND NASOPHARYNX:  Normal  SUPRAHYOID NECK:  Normal oropharynx and oral cavity  Normal parapharyngeal and retropharyngeal spaces  Normal tonsillar tissue and epiglottis  Normal infratemporal space  INFRAHYOID NECK:  Aryepiglottic folds and piriform sinuses  Normal larynx and subglottic airway  THYROID GLAND: Thyroid gland is heterogeneous  There is a homogeneously hypodense nodule immediately posterior to the right lobe of the thyroid gland measuring 1 2 cm in maximum dimension    Incidental discovery of one or more thyroid nodule(s) measuring  less than 1 5 cm and without suspicious features is noted in this patient who is above 28years old; according to guidelines published in the February 2015 white paper on incidental thyroid nodules in the Journal of the [employer] of Radiology [facility]), no further evaluation is recommended  PAROTID AND SUBMANDIBULAR GLANDS: Normal  LYMPH NODES:  No pathologic or enlarged adenopathy  VASCULAR STRUCTURES:  Mild vascular calcification of the common carotid artery bifurcation and aortic arch  See above description of vascular calcification of the left vertebral artery  THORACIC INLET:  See separate CT chest report  Mild apical scarring and pleural thickening noted bilaterally with small pleural calcifications possibly related to asbestos-related pleural disease  Ill-defined nodular opacities in the right upper lobe  BONY STRUCTURES:  Normal      Impression: No adenopathy or mass  No evidence of airway compromise  Pleural thickening and apical scarring noted at the lung apices  Punctate calcifications within the pleura on the left, 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  Subtle nodular opacities identified in the right upper lobe  See separate CT chest report  Workstation performed: [id number]     Ct Chest Wo Contrast    Result Date: 2/2/2019  Narrative: CT CHEST WITHOUT IV CONTRAST INDICATION:   Recent history of food impaction  Complicated pneumonia  COMPARISON:  None  TECHNIQUE: CT examination of the chest was performed without intravenous contrast   Axial, sagittal, and coronal 2D reformatted images were created from the source data and submitted for interpretation  Radiation dose length product (DLP) for this visit:  379 64 mGy-cm   This examination, like all CT scans performed in the [facility], was performed utilizing techniques to minimize radiation dose exposure, including the use of iterative  reconstruction and automated exposure control  FINDINGS: LUNGS: Emphysematous changes    Sub-4 mm groundglass nodules in the right upper lobe on images 27, 33  Solid 6 mm right lower lobe nodule on image 40  Tree-in-bud micronodules in the right middle lobe on image 60  Scarring in the lingula  Sub-4 mm ground glass nodules in the left lower lobe on images 73, 77, 80, and 86  Atelectasis in both posterior lower lobes  No other acute consolidation  No endobronchial lesions  PLEURA:  Bilateral small layering pleural effusions effusions  No loculated components  Chronic biapical fibronodular pleural thickening, slightly greater on the right  HEART/GREAT VESSELS:  Heart size is normal   Aortic valvular and coronary calcifications  No pericardial thickening or effusion  Thoracic aortic atherosclerosis without aneurysm  MEDIASTINUM AND KAVYA:  Calcified, nonenlarged right hilar and subcarinal lymph nodes  No pathologic lymphadenopathy  CHEST WALL AND LOWER NECK:   Unremarkable  VISUALIZED STRUCTURES IN THE UPPER ABDOMEN:  Calcified granulomata in the liver and spleen  Right renal cyst   No acute findings  OSSEOUS STRUCTURES:  No acute fracture or destructive osseous lesion  Impression: 1  Scattered sub-4 mm groundglass nodules in both lungs and right middle lobe centrilobular micronodules  may represent atypical infectious process  2   There is also a 6 mm solid nodule in the right middle lobe  Sequelae of chronic granulomatous process in the mediastinum, liver, and spleen  Decision of whether to follow up nodules nodules with CT in 12 months should be based on consideration of patient age and comorbidities  3   Small bilateral pleural effusions  Workstation performed: [id number]     X-ray Chest 1 View    Result Date: 2/1/2019  Narrative: CHEST INDICATION:   rule out aspiration, food bolus  Difficulty swallowing  COMPARISON:  1/31/2019 EXAM PERFORMED/VIEWS:  XR CHEST 1 VIEW FINDINGS: Cardiomediastinal silhouette appears unremarkable  The lungs are clear  No pneumothorax or pleural effusion   Osseous structures appear within normal limits for patient age      Impression: No acute cardiopulmonary disease  Workstation performed: [id number]       Incidental Findings:   · CT chest finding as above    Test Results Pending at Discharge (will require follow up):   · As per After Visit Summary     Outpatient Tests Requested:  · CBC in 1 week with PMD  · EGD in 2 months  · CT chest in 6-12 months if desired    Complications:  None    Hospital Course: Shannon Rodriguez is a 80 y o  female patient with past medical history of CAD, hypertension, dyslipidemia, aortic valve stenosis, CKD who originally presented to the hospital on 1/31/2019 due to dysphagia  Patient had steak for dinner and subsequently she had difficulty in swallowing, not able to clear the secretion  Patient was evaluated emergency department and noted to have food impaction and was urgently taken for EGD  Patient was noted to have large amount of fluid and solid debris throughout the esophagus which was suctioned  Patient was noted to have class C erosive reflux esophagitis but no stricture or mass noted  Patient subsequently noted to have hypoxia requiring supplemental oxygen and patient was admitted for further evaluation workup  Patient was admitted to the hospital, treated for aspiration pneumonia, noted to have a worsening of white count and procalcitonin initially and subsequently improved  Patient was treated with IV Zosyn  Patient relatively remained asymptomatic without any chest pain shortness of breath and had minimal cough  Oxygenation gradually improved and patient is currently saturating well on room air  Patient was seen by GI and cleared for diet advancement  Patient tolerated diet without any limiting symptoms  CT chest was done which showed findings as above  Patient remained clinically stable, afebrile with stable vital sign saturating adequately at room air  Tolerating diet well without any cardiac respiratory symptoms    Antibiotics were transitioned to oral and patient will be discharged home  Patient was evaluated by Physical therapy and 24 hour supervision was advised this was discussed with patient's  and daughter over the phone over will be able to provide assistance  Patient follow up with PMD in 1 week for follow-up after discharge and CBC  Patient will also follow up with GI for repeat EGD in 2 months  Please see above list of diagnoses and related plan for additional information  Condition at Discharge: stable     Discharge Day Visit / Exam:     Subjective:  Feels well  Sitting up in chair  Tolerating diet without any limiting symptoms  Denies any chest pain shortness of breath or cough today  Sore throat has resolved  Denies any abdominal pain nausea vomiting or diarrhea      Vitals: Blood Pressure: 163/73 (02/03/19 0741)  Pulse: 87 (02/03/19 0741)  Temperature: 99 2 °F (37 3 °C) (02/03/19 0741)  Temp Source: Oral (02/03/19 0741)  Respirations: 18 (02/03/19 0741)  Height: 5' 4" (162 6 cm) (02/02/19 0700)  Weight - Scale: 71 kg (156 lb 8 4 oz) (02/02/19 0700)  SpO2: 97 % (02/03/19 0741)  Exam:   Physical Exam   Constitutional: She appears well-developed  No distress  HENT:   Head: Normocephalic and atraumatic  Nose: Nose normal    Eyes: Pupils are equal, round, and reactive to light  Conjunctivae are normal    Neck: Normal range of motion and full passive range of motion without pain  Neck supple  No tracheal tenderness present  Cardiovascular: Normal rate, regular rhythm and normal heart sounds  Pulmonary/Chest: Effort normal  No stridor  No respiratory distress  She has decreased breath sounds  She has no wheezes  She has no rhonchi  She has no rales  Abdominal: Soft  Bowel sounds are normal  She exhibits no distension  There is no tenderness  There is no rebound and no guarding  Musculoskeletal: She exhibits no edema  Neurological: She is alert  She is not disoriented  No cranial nerve deficit or sensory deficit  GCS eye subscore is 4  GCS verbal subscore is 5  GCS motor subscore is 6  Skin: Skin is warm and dry  No rash noted  Psychiatric: She has a normal mood and affect  Discharge instructions/Information to patient and family:(Discharge Medications and Follow up):   See after visit summary for information provided to patient and family  Provisions for Follow-Up Care:  See after visit summary for information related to follow-up care and any pertinent home health orders  Disposition: Home with [facility]NA and 24 hour family assist    Planned Readmission:  No     Discharge Statement:  I spent 45 minutes discharging the patient  This time was spent on the day of discharge  I had direct contact with the patient on the day of discharge  Greater than 50% of the total time was spent examining patient, answering all patient questions, arranging and discussing plan of care with patient as well as directly providing post-discharge instructions  Additional time then spent on discharge activities  Discussed patient and  at bedside and daughter over the phone in detail regarding diagnosis , treatment and follow-up plan  CT chest reveals scattered  Discharge Medications:  See after visit summary for reconciled discharge medications provided to patient and family  ** Please Note: "This note has been constructed using a voice recognition system  Therefore there may be syntax, spelling, and/or grammatical errors   Please call if you have any questions  "**

## 2019-02-04 NOTE — UTILIZATION REVIEW
Auth:   [id number]    Notification of Discharge  This is a Notification of Discharge from our facility [address]  Please be advised that this patient has been discharge from our facility  Below you will find the admission and discharge date and time including the patients disposition  PRESENTATION DATE: 1/31/2019  7:18 PM  IP ADMISSION DATE: 2/2/19 1120  DISCHARGE DATE: 2/3/2019  3:43 PM  DISPOSITION: [address] Utilization Review Department  Phone: [phone]; Fax [phone]  [email or web address]  ATTENTION: Please call with any questions or concerns to [phone]  and carefully listen to the prompts so that you are directed to the right person  Send all requests for admission clinical reviews, approved or denied determinations and any other requests to fax [phone]   All voicemails are confidential

## 2019-02-06 ENCOUNTER — TRANSITIONAL CARE MANAGEMENT (OUTPATIENT)
Dept: FAMILY MEDICINE CLINIC | Facility: CLINIC | Age: 84
End: 2019-02-06

## 2019-02-06 LAB
BACTERIA BLD CULT: NORMAL
BACTERIA BLD CULT: NORMAL

## 2019-02-11 ENCOUNTER — OFFICE VISIT (OUTPATIENT)
Dept: CARDIOLOGY CLINIC | Facility: CLINIC | Age: 84
End: 2019-02-11
Payer: COMMERCIAL

## 2019-02-11 VITALS
WEIGHT: 141 LBS | HEART RATE: 80 BPM | OXYGEN SATURATION: 98 % | SYSTOLIC BLOOD PRESSURE: 156 MMHG | HEIGHT: 64 IN | BODY MASS INDEX: 24.07 KG/M2 | DIASTOLIC BLOOD PRESSURE: 72 MMHG

## 2019-02-11 DIAGNOSIS — Z98.61 CAD S/P PERCUTANEOUS CORONARY ANGIOPLASTY: ICD-10-CM

## 2019-02-11 DIAGNOSIS — N18.30 CHRONIC KIDNEY DISEASE, STAGE III (MODERATE) (HCC): ICD-10-CM

## 2019-02-11 DIAGNOSIS — E78.5 DYSLIPIDEMIA: ICD-10-CM

## 2019-02-11 DIAGNOSIS — I10 ESSENTIAL HYPERTENSION: Primary | ICD-10-CM

## 2019-02-11 DIAGNOSIS — I25.10 CAD S/P PERCUTANEOUS CORONARY ANGIOPLASTY: ICD-10-CM

## 2019-02-11 DIAGNOSIS — I25.2 OLD INFERIOR WALL MYOCARDIAL INFARCTION: ICD-10-CM

## 2019-02-11 DIAGNOSIS — I35.0 MILD AORTIC STENOSIS: ICD-10-CM

## 2019-02-11 DIAGNOSIS — I34.0 MILD MITRAL REGURGITATION: ICD-10-CM

## 2019-02-11 PROCEDURE — 99215 OFFICE O/P EST HI 40 MIN: CPT | Performed by: INTERNAL MEDICINE

## 2019-02-11 PROCEDURE — 93000 ELECTROCARDIOGRAM COMPLETE: CPT | Performed by: INTERNAL MEDICINE

## 2019-02-11 NOTE — PROGRESS NOTES
Cardiology Progress Note    ASSESSMENT:    1   Uncontrolled systolic hypertension, stage II, with addition of amlodipine 5 mg daily about  1 year  ago  2  Stable CAD with old inferior infarct and PCI of RCA 11/18/13 but new EKG changes of inferolateral myocardial infarction compared to 1-1/2 years ago  3  Murmurs of aortic valve stenosis and mitral regurgitation with known mild aortic valve stenosis and MAC/1+ MR as well as 2+ TR  on 04/03/2018 echocardiogram   4  CKD 3 with history of dehydration and YECENIA 3    5  Status post symptomatic bradycardia as metoprolol adverse drug reaction August, 2015  6  History of resolved aerophagia and questionable hematuria with food impaction in esophagus, hiatal hernia, and erosive esophagitis complicated by aspiration pneumonia with hospitalization 1/31-02/03/2019     7  History of noncritical disease of LAD and LCx on 11/18/13 cardiac catheterization with normal nuclear stress test on 3/13/17  8   controlled  dyslipidemia   9  Osteoarthritis and questionable rheumatoid arthritis  10  Chronic PACs, present today  11  Anemia of chronic disease, recently exacerbated during hospital stay         Plan       Patient Instructions     1  Take blood pressures after resting for at least 15 minutes while seated in a chair or at a table with arm supported on arm rest or table  Do 3 readings, one after the other, both morning and evening for 4 consecutive days  2   Write down each and every reading with date and time and get list of readings to the office of Dr Mahi Steve for his review  You also may call in the readings by phone  3   Our office will contact you with further instructions and the results of this review  4  Continue current medications  5  Cardiology follow-up in 6 months with EKG, unless earlier follow-up is needed because of blood pressure            HPI    This 80 y o  female  was recently discharged from [facility] with admission from 01/31 through 02/03/2019 with food impaction in esophagus, complicated by aspiration pneumonia with peak WBC 29K, elevated procalcitonin, with treatment by IV Zosyn  CT of chest was consistent with aspiration pneumonia  Found to have hiatal hernia and erosive esophagitis on EGD and thought likely to have presbyesophagus  Discharged on omeprazole 40 milligrams daily with plan for repeat EGD in approximately 2 months  Patient also found to have anemia with minimum hemoglobin of 8 2 and baseline of 10 5 with planned follow-up of CBC as outpatient  Prior to recent admission patient had had chronic weakness and low energy level with gradually diminishing appetite  She is primarily sedentary and uses a cane or walker at home  She denies any recent falls, chest pain, dyspnea, PND, orthopnea, syncope, dizziness, wheezing, cough, sputum production, fever, chills  Review of Systems    All other systems negative, except as noted in history of present illness    Historical Information   Past Medical History:   Diagnosis Date    Abnormal EKG     Aortic valve stenosis     Arthritis     CKD (chronic kidney disease), stage III ([facility] [place] [address] [facility]     Coronary artery disease     emergent PCI 11/18/13    Hypertension     Mitral and aortic regurgitation     Myocardial infarction ([facility] [place] [address] ) 11/2013    with 1 stent     Past Surgical History:   Procedure Laterality Date    APPENDECTOMY      BILATERAL OOPHORECTOMY      CORONARY STENT PLACEMENT  2013    after MI (x1 stent)    DILATION AND CURETTAGE OF UTERUS      ESOPHAGUS FOREIGN BODY REMOVAL N/A 1/31/2019    Procedure: REMOVAL FOREIGN BODY ESOPHAGUS;  Surgeon: Heber Macedo MD;  Location: [facility] GI LAB; Service: Gastroenterology    JOINT REPLACEMENT Left     knee     [facility] CATARACT EXTRACAP,INSERT LENS Left 12/19/2016    Procedure: EXTRACTION EXTRACAPSULAR CATARACT PHACO INTRAOCULAR LENS (IOL);   Surgeon: Enrrique Calles MD;  Location: [facility] MAIN OR;  Service: Ophthalmology    [facility] CATARACT EXTRACAP,INSERT LENS Right 6/18/2018    Procedure: EXTRACTION EXTRACAPSULAR CATARACT PHACO INTRAOCULAR LENS (IOL); Surgeon: Kate Smith MD;  Location: [facility] MAIN OR;  Service: Ophthalmology    TONSILLECTOMY       Social History     Substance and Sexual Activity   Alcohol Use Yes    Comment: drinks whiskey 2 x week     Social History     Substance and Sexual Activity   Drug Use No     Social History     Tobacco Use   Smoking Status Never Smoker   Smokeless Tobacco Never Used       Family History:  Family History   Problem Relation Age of Onset    Heart disease Sister         enlarged heart         Meds/Allergies     Prior to Admission medications    Medication Sig Start Date End Date Taking?  Authorizing Provider   amLODIPine (NORVASC) 5 mg tablet Take 1 tablet (5 mg total) by mouth daily for 30 days  Patient taking differently: Take 5 mg by mouth every evening   2/16/18 2/11/19 Yes Lucía Harmon MD   aspirin 81 MG tablet Take 81 mg by mouth 2 (two) times a day   Yes Historical Provider, MD   losartan-hydrochlorothiazide (HYZAAR) 100-25 MG per tablet Take 1 tablet by mouth daily  Patient taking differently: Take 1 tablet by mouth every morning   5/22/18  Yes Lucía Harmon MD   Multiple Vitamins-Minerals (PRESERVISION AREDS) CAPS Take by mouth 7/25/17  Yes Historical Provider, MD   omeprazole (PriLOSEC) 40 MG capsule Take 1 capsule (40 mg total) by mouth daily 1/31/19  Yes Luis Alberto Sherwood MD   simvastatin (ZOCOR) 20 mg tablet Take 1 tablet (20 mg total) by mouth daily at bedtime 7/25/18  Yes Lucía Harmon MD   Bromfenac Sodium (BROMSITE) 0 075 % SOLN Administer 1 drop to the right eye 2 (two) times a day  Patient not taking: Reported on 2/11/2019 6/18/18   Kate Smith MD   gatifloxacin (ZYMAXID) 0 5 % Administer 1 drop to the right eye 2 (two) times a day  Patient not taking: Reported on 2/11/2019 6/18/18   Kate Smith MD       Allergies   Allergen Reactions    Metoprolol Other (See Comments)     According to previous medical record, you developed bradycardia and pre-syncope in 2015 while on metoprolol ER 25mg, so please DO NOT take metoprolol or other beta blocker  Vitals:    02/11/19 1454   BP: 156/72   BP Location: Left arm   Patient Position: Sitting   Cuff Size: Standard   Pulse: 80   SpO2: 98%   Weight: 64 kg (141 lb)   Height: 5' 3 5" (1 613 m)       Body mass index is 24 59 kg/m²    8 6 pound weight loss in approximately 6-1/2 months  End of exam sitting blood pressure left upper extremity, standard cuff:  152/60    Physical Exam:    General Appearance:  Alert, cooperative, no distress, appears stated age   Head:  Normocephalic, without obvious abnormality, atraumatic   Eyes:  PERRL, conjunctiva/corneas clear, EOM's intact,   both eyes   Ears:  Normal TM's and external ear canals, both ears   Nose: Nares normal, septum midline, mucosa normal, no drainage or sinus tenderness   Throat: Lips, mucosa, and tongue normal; teeth and gums normal   Neck: Supple, symmetrical, trachea midline, no adenopathy, thyroid: not enlarged, symmetric, no tenderness/mass/nodules, no carotid bruit or JVD   Back:   Symmetric, no curvature, ROM normal, no CVA tenderness   Lungs:   Clear to auscultation bilaterally, respirations unlabored   Chest Wall:  No tenderness or deformity   Heart:  Frequent extrasystoles noted, S1, S2 muffled, grade 3/6 aortic systolic ejection murmur, radiating to left more than right sternal border with slight muffling of A2 and no rub or gallop   Abdomen:   Soft, non-tender, bowel sounds active all four quadrants,  no masses, no organomegaly   Extremities: Extremities normal, atraumatic, no cyanosis or edema   Pulses: 2+ and symmetric   Skin: Skin showed pale l color, with normal texture, turgor and no rashes or lesions   Lymph nodes: Cervical, supraclavicular, and axillary nodes normal   Neurologic: Normal         Cardiographics    ECG  02/11/2019:    Excessive motion artifact  Single PAC  Possible old inferior infarct  Poor septal R-wave progression  Similar to 1/30 1/19    Imaging    Chest X-Ray 01/31/2019:  No acute cardiopulmonary disease    CT chest 02/02/2019:    1  Scattered sub-4 mm groundglass nodules in both lungs and right middle lobe centrilobular micronodules  may represent atypical infectious process      2  There is also a 6 mm solid nodule in the right middle lobe  Sequelae of chronic granulomatous process in the mediastinum, liver, and spleen  Decision of whether to follow up nodules nodules with CT in 12 months should be based on consideration of   patient age and comorbidities      3   Small bilateral pleural effusions  Lab Review       Lab Results   Component Value Date    SODIUM 142 02/03/2019    K 3 8 02/03/2019     02/03/2019    CO2 27 02/03/2019    BUN 19 02/03/2019    CREATININE 1 17 02/03/2019    GLUF 84 02/02/2019    CALCIUM 8 5 02/03/2019    AST 9 01/31/2019    ALT 14 01/31/2019    ALKPHOS 88 01/31/2019    EGFR 39 02/03/2019       Lab Results   Component Value Date    CHOLESTEROL 151 01/23/2019    CHOLESTEROL 194 05/30/2018    CHOLESTEROL 160 07/21/2017     Lab Results   Component Value Date    HDL 59 01/23/2019    HDL 46 05/30/2018    HDL 51 07/21/2017     No results found for: LDLCHOLEST  Lab Results   Component Value Date    LDLCALC 73 01/23/2019    LDLCALC 110 (H) 05/30/2018    LDLCALC 78 07/21/2017     No components found for: [facility]  Lab Results   Component Value Date    TRIG 94 01/23/2019    TRIG 191 (H) 05/30/2018    TRIG 153 (H) 07/21/2017         Lab Results   Component Value Date    CALCIUM 8 5 02/03/2019    K 3 8 02/03/2019    CO2 27 02/03/2019     02/03/2019    BUN 19 02/03/2019    CREATININE 1 17 02/03/2019       Detailed review of recent hospitalization along with update of history over the past 6-1/2 months and careful examination and systems review resulted in 42 minutes long office visit       Sit Dawit Antonio MD

## 2019-02-11 NOTE — UTILIZATION REVIEW
Auth:   [id number]    Notification of Discharge  This is a Notification of Discharge from our facility [address]  Please be advised that this patient has been discharge from our facility  Below you will find the admission and discharge date and time including the patients disposition  PRESENTATION DATE: 1/31/2019  7:18 PM  IP ADMISSION DATE: 2/2/19 1120  DISCHARGE DATE: 2/3/2019  3:43 PM  DISPOSITION: [id number] Utilization Review Department  Phone: [phone]; Fax [phone]  [email or web address]  ATTENTION: Please call with any questions or concerns to [phone]  and carefully listen to the prompts so that you are directed to the right person  Send all requests for admission clinical reviews, approved or denied determinations and any other requests to fax [phone]   All voicemails are confidential

## 2019-02-11 NOTE — PATIENT INSTRUCTIONS
1  Take blood pressures after resting for at least 15 minutes while seated in a chair or at a table with arm supported on arm rest or table  Do 3 readings, one after the other, both morning and evening for 4 consecutive days  2   Write down each and every reading with date and time and get list of readings to the office of Dr Dwight Cook for his review  You also may call in the readings by phone  3   Our office will contact you with further instructions and the results of this review  4  Continue current medications  5  Cardiology follow-up in 6 months with EKG, unless earlier follow-up is needed because of blood pressure

## 2019-02-14 ENCOUNTER — TELEPHONE (OUTPATIENT)
Dept: CARDIOLOGY CLINIC | Facility: CLINIC | Age: 84
End: 2019-02-14

## 2019-02-15 DIAGNOSIS — I10 ESSENTIAL HYPERTENSION: ICD-10-CM

## 2019-02-18 RX ORDER — AMLODIPINE BESYLATE 5 MG/1
5 TABLET ORAL EVERY EVENING
Qty: 30 TABLET | Refills: 5 | Status: SHIPPED | OUTPATIENT
Start: 2019-02-18 | End: 2019-07-29 | Stop reason: SDUPTHER

## 2019-02-22 ENCOUNTER — TELEPHONE (OUTPATIENT)
Dept: CARDIOLOGY CLINIC | Facility: CLINIC | Age: 84
End: 2019-02-22

## 2019-02-22 ENCOUNTER — DOCUMENTATION (OUTPATIENT)
Dept: CARDIOLOGY CLINIC | Facility: CLINIC | Age: 84
End: 2019-02-22

## 2019-02-22 NOTE — PROGRESS NOTES
Average of home blood pressures taken from 02/13/2019 through 02/16/2019 was computed, using 3 blood pressures each day with deletion of highest reading  Average overall blood pressure was 138/66  Based on her advanced age, I believe this is a satisfactory average blood pressure  No changes in medication will be recommended

## 2019-02-22 NOTE — TELEPHONE ENCOUNTER
----- Message from Rodolfo Garza MD sent at 2/22/2019 10:53 AM EST -----  Regarding: Blood pressure readings  Average home blood pressure was 138/66  Based on her current age, I believe this is a satisfactory blood pressure and do not recommend any change in treatment  Continue current medication  Please call the patient and let her know  Let her  know as well

## 2019-06-19 ENCOUNTER — TELEPHONE (OUTPATIENT)
Dept: CARDIOLOGY CLINIC | Facility: CLINIC | Age: 84
End: 2019-06-19

## 2019-06-19 DIAGNOSIS — I10 HYPERTENSION, UNSPECIFIED TYPE: ICD-10-CM

## 2019-06-19 DIAGNOSIS — I25.2 OLD MI (MYOCARDIAL INFARCTION): ICD-10-CM

## 2019-06-19 RX ORDER — LOSARTAN POTASSIUM AND HYDROCHLOROTHIAZIDE 25; 100 MG/1; MG/1
1 TABLET ORAL EVERY MORNING
Qty: 30 TABLET | Refills: 5 | Status: SHIPPED | OUTPATIENT
Start: 2019-06-19 | End: 2019-12-14 | Stop reason: SDUPTHER

## 2019-06-19 RX ORDER — LOSARTAN POTASSIUM AND HYDROCHLOROTHIAZIDE 25; 100 MG/1; MG/1
1 TABLET ORAL EVERY MORNING
Qty: 90 TABLET | Refills: 3 | Status: CANCELLED | OUTPATIENT
Start: 2019-06-19

## 2019-07-12 DIAGNOSIS — E78.5 DYSLIPIDEMIA: ICD-10-CM

## 2019-07-12 RX ORDER — SIMVASTATIN 20 MG
20 TABLET ORAL
Qty: 30 TABLET | Refills: 5 | Status: SHIPPED | OUTPATIENT
Start: 2019-07-12 | End: 2020-07-17

## 2019-07-29 DIAGNOSIS — I10 ESSENTIAL HYPERTENSION: ICD-10-CM

## 2019-07-29 RX ORDER — AMLODIPINE BESYLATE 5 MG/1
TABLET ORAL
Qty: 30 TABLET | Refills: 5 | Status: SHIPPED | OUTPATIENT
Start: 2019-07-29 | End: 2020-02-13 | Stop reason: ALTCHOICE

## 2019-08-14 ENCOUNTER — OFFICE VISIT (OUTPATIENT)
Dept: CARDIOLOGY CLINIC | Facility: CLINIC | Age: 84
End: 2019-08-14
Payer: COMMERCIAL

## 2019-08-14 VITALS
BODY MASS INDEX: 22.53 KG/M2 | DIASTOLIC BLOOD PRESSURE: 52 MMHG | HEART RATE: 66 BPM | OXYGEN SATURATION: 98 % | WEIGHT: 132 LBS | SYSTOLIC BLOOD PRESSURE: 120 MMHG | HEIGHT: 64 IN

## 2019-08-14 DIAGNOSIS — N18.30 CHRONIC KIDNEY DISEASE, STAGE III (MODERATE) (HCC): ICD-10-CM

## 2019-08-14 DIAGNOSIS — I25.10 CAD S/P PERCUTANEOUS CORONARY ANGIOPLASTY: ICD-10-CM

## 2019-08-14 DIAGNOSIS — E78.5 DYSLIPIDEMIA: ICD-10-CM

## 2019-08-14 DIAGNOSIS — I10 ESSENTIAL HYPERTENSION: Primary | ICD-10-CM

## 2019-08-14 DIAGNOSIS — I25.2 OLD INFERIOR WALL MYOCARDIAL INFARCTION: ICD-10-CM

## 2019-08-14 DIAGNOSIS — I35.0 MILD AORTIC STENOSIS: ICD-10-CM

## 2019-08-14 DIAGNOSIS — I34.0 MILD MITRAL REGURGITATION: ICD-10-CM

## 2019-08-14 DIAGNOSIS — Z98.61 CAD S/P PERCUTANEOUS CORONARY ANGIOPLASTY: ICD-10-CM

## 2019-08-14 PROCEDURE — 93000 ELECTROCARDIOGRAM COMPLETE: CPT | Performed by: INTERNAL MEDICINE

## 2019-08-14 PROCEDURE — 99214 OFFICE O/P EST MOD 30 MIN: CPT | Performed by: INTERNAL MEDICINE

## 2019-08-14 NOTE — PROGRESS NOTES
Office Cardiology Progress Note  Ofe Banks 80 y o  female MRN: [id number]  08/14/19  1:37 PM      ASSESSMENT:    1   Currently controlled systolic essential hypertension,  with addition of amlodipine 5 mg daily about  1-1/2 years  ago  2  Stable CAD with old inferior infarct and PCI of RCA 11/18/13 but resolved EKG changes of inferolateral myocardial infarction since six months ago  3  Murmurs of aortic valve stenosis and mitral regurgitation with known mild aortic valve stenosis and MAC/1+ MR as well as 2+ TR  on 04/03/2018 echocardiogram   4  CKD 3 with history of dehydration and YECENIA 3    5  Status post symptomatic bradycardia as metoprolol adverse drug reaction August, 2015  6  History of resolved aerophagia and questionable hematuria with food impaction in esophagus, hiatal hernia, and erosive esophagitis complicated by aspiration pneumonia with hospitalization 1/31-02/03/2019     7  History of noncritical disease of LAD and LCx on 11/18/13 cardiac catheterization with normal nuclear stress test on 3/13/17  8  Controlled  dyslipidemia   9  Osteoarthritis and questionable rheumatoid arthritis  10  Chronic PACs, present today  11  Anemia of chronic disease, previously exacerbated during hospital stay  12  Recent onset of painful right medial ankle skin breakdown and superficial ulceration with serous drainage  No obvious evidence of infection at this time  13  14 6 pound weight loss in approximately 1+ year  Plan       Patient Instructions     1  See Dr Madhuri Ortiz regarding medial right ankle nonhealing wound with serous drainage, potentially with wound care center referral     2  Continue current medications  3  Cardiology follow-up approximately six months with EKG, lipids, CMP  HPI    This 80 y o  female  denies new cardiopulmonary  symptoms  She has noted the recent onset of discomfort and aching in the medial right ankle region    She additionally complains of chronic easy fatigue, lack of energy, and generally reduced appetite  She claims to still eat 3 times a day but much smaller portions  She denies recent fever, chills, cough, sputum  The patient is being seen today in follow-up of the below listed problems:    Encounter Diagnoses   Name Primary?  Essential hypertension Yes    CAD S/P percutaneous coronary angioplasty     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     Dyslipidemia     Mild aortic stenosis     Mild mitral regurgitation     Chronic kidney disease, stage III (moderate) ([facility])         Review of Systems    All other systems negative, except as noted in history of present illness    Historical Information   Past Medical History:   Diagnosis Date    Abnormal EKG     Aortic valve stenosis     Arthritis     CKD (chronic kidney disease), stage III ([facility] [address]     Coronary artery disease     emergent PCI 11/18/13    Hypertension     Mitral and aortic regurgitation     Myocardial infarction ([facility] [address] ) 11/2013    with 1 stent     Past Surgical History:   Procedure Laterality Date    APPENDECTOMY      BILATERAL OOPHORECTOMY      CORONARY STENT PLACEMENT  2013    after MI (x1 stent)    DILATION AND CURETTAGE OF UTERUS      ESOPHAGUS FOREIGN BODY REMOVAL N/A 1/31/2019    Procedure: REMOVAL FOREIGN BODY ESOPHAGUS;  Surgeon: Martin Smith MD;  Location: [facility] GI LAB; Service: Gastroenterology    JOINT REPLACEMENT Left     knee     [facility] CATARACT EXTRACAP,INSERT LENS Left 12/19/2016    Procedure: EXTRACTION EXTRACAPSULAR CATARACT PHACO INTRAOCULAR LENS (IOL); Surgeon: Sarah Rankin MD;  Location: [facility] OR;  Service: Ophthalmology     [address] Right 6/18/2018    Procedure: EXTRACTION EXTRACAPSULAR CATARACT PHACO INTRAOCULAR LENS (IOL);   Surgeon: Sarah Rankin MD;  Location: [facility] OR;  Service: Ophthalmology    TONSILLECTOMY       Social History     Substance and Sexual Activity   Alcohol Use Yes    Comment: drinks whiskey 2 x week Social History     Substance and Sexual Activity   Drug Use No     Social History     Tobacco Use   Smoking Status Never Smoker   Smokeless Tobacco Never Used       Family History:  Family History   Problem Relation Age of Onset    Heart disease Sister         enlarged heart         Meds/Allergies     Prior to Admission medications    Medication Sig Start Date End Date Taking? Authorizing Provider   amLODIPine (NORVASC) 5 mg tablet take 1 tablet by mouth once daily 7/29/19  Yes Brooke Ramírez MD   aspirin 81 MG tablet Take 81 mg by mouth 2 (two) times a day   Yes Historical Provider, MD   losartan-hydrochlorothiazide (HYZAAR) 100-25 MG per tablet Take 1 tablet by mouth every morning 6/19/19  Yes Brooke Ramírez MD   Multiple Vitamins-Minerals (PRESERVISION AREDS) CAPS Take by mouth 7/25/17  Yes Historical Provider, MD   omeprazole (PriLOSEC) 40 MG capsule Take 1 capsule (40 mg total) by mouth daily 1/31/19  Yes Roxane Pollock MD   simvastatin (ZOCOR) 20 mg tablet Take 1 tablet (20 mg total) by mouth daily at bedtime 7/12/19  Yes Brooke Ramírez MD   Bromfenac Sodium (BROMSITE) 0 075 % SOLN Administer 1 drop to the right eye 2 (two) times a day  Patient not taking: Reported on 8/14/2019 6/18/18 8/14/19  Buck Barnett MD   gatifloxacin (ZYMAXID) 0 5 % Administer 1 drop to the right eye 2 (two) times a day  Patient not taking: Reported on 8/14/2019 6/18/18 8/14/19  Buck Barnett MD       Allergies   Allergen Reactions    Metoprolol Other (See Comments)     According to previous medical record, you developed bradycardia and pre-syncope in 2015 while on metoprolol ER 25mg, so please DO NOT take metoprolol or other beta blocker  Vitals:    08/14/19 1253   BP: 120/52   BP Location: Left arm   Patient Position: Sitting   Cuff Size: Standard   Pulse: 66   SpO2: 98%   Weight: 59 9 kg (132 lb)   Height: 5' 3 5" (1 613 m)       Body mass index is 23 02 kg/m²    9 pound weight loss in approximately six months and 14 6 pound weight loss in approximately 1+ year    Physical Exam:    General Appearance:  Alert, cooperative, no distress, appears stated age   Head:  Normocephalic, without obvious abnormality, atraumatic   Eyes:  PERRL, conjunctiva/corneas clear, EOM's intact,   both eyes   Ears:  Normal TM's and external ear canals, both ears   Nose: Nares normal, septum midline, mucosa normal, no drainage or sinus tenderness   Throat: Lips, mucosa, and tongue normal; teeth and gums normal   Neck: Supple, symmetrical, trachea midline, no adenopathy, thyroid: not enlarged, symmetric, no tenderness/mass/nodules, no carotid bruit or JVD   Back:   Symmetric, no curvature, ROM normal, no CVA tenderness   Lungs:   Clear to auscultation bilaterally, respirations unlabored   Chest Wall:  No tenderness or deformity   Heart:  Regular rate and rhythm, S1, S2 normal, grade 2/6 aortic systolic ejection   murmur, radiating along left sternal border with additional grade 2/6 apical systolic ejection murmur, intact A2 with no rub or gallop   Abdomen:   Soft, non-tender, bowel sounds active all four quadrants,  no masses, no organomegaly   Extremities: There is a large rectangular area of irregular surfaced and shallowly ulcerated skin over the right medial malleolus and above it involving the right medial calf with serous drainage noted  No increased warmth, redness, or pus noted  Pedal pulses are somewhat diminished to palpation with no cyanosis, clubbing, edema  Pulses: Diminished to palpation bilaterally   Skin: Skin showed normal color, texture, turgor and no rashes or lesions   Lymph nodes: Cervical, supraclavicular, and axillary nodes normal   Neurologic: Normal         Cardiographics    ECG 08/14/19:    Essentially normal ECG with low QRS voltage in V2-V3  Similar to 02/11/2019    IMAGING:    No Chest XR results available for this patient            LAB REVIEW:      Lab Results   Component Value Date    SODIUM 142 02/03/2019    K 3 8 02/03/2019     02/03/2019    CO2 27 02/03/2019    BUN 19 02/03/2019    CREATININE 1 17 02/03/2019    GLUF 84 02/02/2019    CALCIUM 8 5 02/03/2019    AST 9 01/31/2019    ALT 14 01/31/2019    ALKPHOS 88 01/31/2019    EGFR 39 02/03/2019     Lab Results   Component Value Date    CHOLESTEROL 151 01/23/2019    CHOLESTEROL 194 05/30/2018    CHOLESTEROL 160 07/21/2017     Lab Results   Component Value Date    HDL 59 01/23/2019    HDL 46 05/30/2018    HDL 51 07/21/2017     No results found for: LDLCHOLEST  Lab Results   Component Value Date    LDLCALC 73 01/23/2019    LDLCALC 110 (H) 05/30/2018    LDLCALC 78 07/21/2017     No components found for: [facility]  Lab Results   Component Value Date    TRIG 94 01/23/2019    TRIG 191 (H) 05/30/2018    TRIG 153 (H) 07/21/2017               Tracy Bruno MD

## 2019-08-14 NOTE — PATIENT INSTRUCTIONS
1  See Dr Sunny Duran regarding medial right ankle nonhealing wound with serous drainage, potentially with wound care center referral     2  Continue current medications  3  Cardiology follow-up approximately six months with EKG, lipids, CMP

## 2019-08-15 ENCOUNTER — HOSPITAL ENCOUNTER (OUTPATIENT)
Dept: RADIOLOGY | Facility: HOSPITAL | Age: 84
Discharge: HOME | End: 2019-08-15
Attending: FAMILY MEDICINE
Payer: COMMERCIAL

## 2019-08-15 ENCOUNTER — TRANSCRIBE ORDERS (OUTPATIENT)
Dept: ADMINISTRATIVE | Facility: HOSPITAL | Age: 84
End: 2019-08-15

## 2019-08-15 DIAGNOSIS — R60.0 LOCALIZED EDEMA: Primary | ICD-10-CM

## 2019-08-15 DIAGNOSIS — R60.0 LOCALIZED EDEMA: ICD-10-CM

## 2019-08-15 PROCEDURE — 93971 EXTREMITY STUDY: CPT

## 2019-08-15 PROCEDURE — 93971 EXTREMITY STUDY: CPT | Performed by: SURGERY

## 2019-09-04 ENCOUNTER — APPOINTMENT (OUTPATIENT)
Dept: WOUND CARE | Facility: HOSPITAL | Age: 84
End: 2019-09-04
Payer: COMMERCIAL

## 2019-09-04 DIAGNOSIS — L97.309 VENOUS STASIS ULCER OF ANKLE, UNSPECIFIED LATERALITY, UNSPECIFIED ULCER STAGE, UNSPECIFIED WHETHER VARICOSE VEINS PRESENT (HCC): Primary | ICD-10-CM

## 2019-09-04 DIAGNOSIS — I83.003 VENOUS STASIS ULCER OF ANKLE, UNSPECIFIED LATERALITY, UNSPECIFIED ULCER STAGE, UNSPECIFIED WHETHER VARICOSE VEINS PRESENT (HCC): Primary | ICD-10-CM

## 2019-09-04 PROCEDURE — 99213 OFFICE O/P EST LOW 20 MIN: CPT

## 2019-09-04 PROCEDURE — 97597 DBRDMT OPN WND 1ST 20 CM/<: CPT

## 2019-09-06 ENCOUNTER — TRANSCRIBE ORDERS (OUTPATIENT)
Dept: ADMINISTRATIVE | Facility: HOSPITAL | Age: 84
End: 2019-09-06

## 2019-09-06 DIAGNOSIS — L97.311 NON-PRESSURE CHRONIC ULCER OF RIGHT ANKLE, LIMITED TO BREAKDOWN OF SKIN (HCC): Primary | ICD-10-CM

## 2019-09-11 ENCOUNTER — APPOINTMENT (OUTPATIENT)
Dept: WOUND CARE | Facility: HOSPITAL | Age: 84
End: 2019-09-11
Payer: COMMERCIAL

## 2019-09-11 PROCEDURE — 97598 DBRDMT OPN WND ADDL 20CM/<: CPT

## 2019-09-11 PROCEDURE — 97597 DBRDMT OPN WND 1ST 20 CM/<: CPT

## 2019-09-13 ENCOUNTER — HOSPITAL ENCOUNTER (OUTPATIENT)
Dept: RADIOLOGY | Facility: HOSPITAL | Age: 84
Discharge: HOME | End: 2019-09-13
Attending: FAMILY MEDICINE
Payer: COMMERCIAL

## 2019-09-13 DIAGNOSIS — L97.311 NON-PRESSURE CHRONIC ULCER OF RIGHT ANKLE, LIMITED TO BREAKDOWN OF SKIN (HCC): ICD-10-CM

## 2019-09-13 PROCEDURE — 93926 LOWER EXTREMITY STUDY: CPT

## 2019-09-13 PROCEDURE — 93971 EXTREMITY STUDY: CPT

## 2019-09-13 PROCEDURE — 93971 EXTREMITY STUDY: CPT | Performed by: SURGERY

## 2019-09-14 PROCEDURE — 93926 LOWER EXTREMITY STUDY: CPT | Performed by: SURGERY

## 2019-09-14 PROCEDURE — 93922 UPR/L XTREMITY ART 2 LEVELS: CPT | Performed by: SURGERY

## 2019-09-25 ENCOUNTER — APPOINTMENT (OUTPATIENT)
Dept: WOUND CARE | Facility: HOSPITAL | Age: 84
End: 2019-09-25
Payer: COMMERCIAL

## 2019-09-25 PROCEDURE — 97597 DBRDMT OPN WND 1ST 20 CM/<: CPT

## 2019-10-02 ENCOUNTER — APPOINTMENT (OUTPATIENT)
Dept: WOUND CARE | Facility: HOSPITAL | Age: 84
End: 2019-10-02
Payer: COMMERCIAL

## 2019-10-02 PROCEDURE — 97597 DBRDMT OPN WND 1ST 20 CM/<: CPT

## 2019-10-09 ENCOUNTER — APPOINTMENT (OUTPATIENT)
Dept: WOUND CARE | Facility: HOSPITAL | Age: 84
End: 2019-10-09
Payer: COMMERCIAL

## 2019-10-09 PROCEDURE — 97597 DBRDMT OPN WND 1ST 20 CM/<: CPT

## 2019-10-16 ENCOUNTER — APPOINTMENT (OUTPATIENT)
Dept: WOUND CARE | Facility: HOSPITAL | Age: 84
End: 2019-10-16
Payer: COMMERCIAL

## 2019-10-16 PROCEDURE — 99212 OFFICE O/P EST SF 10 MIN: CPT

## 2019-12-14 DIAGNOSIS — I10 HYPERTENSION, UNSPECIFIED TYPE: ICD-10-CM

## 2019-12-14 DIAGNOSIS — I25.2 OLD MI (MYOCARDIAL INFARCTION): ICD-10-CM

## 2019-12-16 RX ORDER — LOSARTAN POTASSIUM AND HYDROCHLOROTHIAZIDE 25; 100 MG/1; MG/1
1 TABLET ORAL EVERY MORNING
Qty: 30 TABLET | Refills: 5 | Status: SHIPPED | OUTPATIENT
Start: 2019-12-16 | End: 2020-02-02

## 2020-02-01 DIAGNOSIS — I10 HYPERTENSION, UNSPECIFIED TYPE: ICD-10-CM

## 2020-02-01 DIAGNOSIS — I25.2 OLD MI (MYOCARDIAL INFARCTION): ICD-10-CM

## 2020-02-02 RX ORDER — LOSARTAN POTASSIUM AND HYDROCHLOROTHIAZIDE 25; 100 MG/1; MG/1
1 TABLET ORAL EVERY MORNING
Qty: 30 TABLET | Refills: 5 | Status: SHIPPED | OUTPATIENT
Start: 2020-02-02 | End: 2020-07-25

## 2020-02-13 ENCOUNTER — OFFICE VISIT (OUTPATIENT)
Dept: CARDIOLOGY CLINIC | Facility: CLINIC | Age: 85
End: 2020-02-13
Payer: COMMERCIAL

## 2020-02-13 ENCOUNTER — TELEPHONE (OUTPATIENT)
Dept: CARDIOLOGY CLINIC | Facility: CLINIC | Age: 85
End: 2020-02-13

## 2020-02-13 ENCOUNTER — APPOINTMENT (OUTPATIENT)
Dept: LAB | Facility: CLINIC | Age: 85
End: 2020-02-13
Payer: COMMERCIAL

## 2020-02-13 VITALS
BODY MASS INDEX: 22.2 KG/M2 | HEIGHT: 64 IN | DIASTOLIC BLOOD PRESSURE: 80 MMHG | WEIGHT: 130 LBS | OXYGEN SATURATION: 98 % | SYSTOLIC BLOOD PRESSURE: 118 MMHG | HEART RATE: 70 BPM

## 2020-02-13 DIAGNOSIS — I35.0 MILD AORTIC STENOSIS: ICD-10-CM

## 2020-02-13 DIAGNOSIS — I25.10 CAD S/P PERCUTANEOUS CORONARY ANGIOPLASTY: ICD-10-CM

## 2020-02-13 DIAGNOSIS — E78.5 DYSLIPIDEMIA: ICD-10-CM

## 2020-02-13 DIAGNOSIS — I10 ESSENTIAL HYPERTENSION: Primary | ICD-10-CM

## 2020-02-13 DIAGNOSIS — N18.30 CHRONIC KIDNEY DISEASE, STAGE III (MODERATE) (HCC): ICD-10-CM

## 2020-02-13 DIAGNOSIS — I25.2 OLD INFERIOR WALL MYOCARDIAL INFARCTION: ICD-10-CM

## 2020-02-13 DIAGNOSIS — I34.0 MILD MITRAL REGURGITATION: ICD-10-CM

## 2020-02-13 DIAGNOSIS — Z98.61 CAD S/P PERCUTANEOUS CORONARY ANGIOPLASTY: ICD-10-CM

## 2020-02-13 DIAGNOSIS — I25.10 CORONARY ARTERY DISEASE INVOLVING NATIVE CORONARY ARTERY OF NATIVE HEART WITHOUT ANGINA PECTORIS: ICD-10-CM

## 2020-02-13 LAB
ALBUMIN SERPL BCP-MCNC: 3.1 G/DL (ref 3.5–5)
ALP SERPL-CCNC: 69 U/L (ref 46–116)
ALT SERPL W P-5'-P-CCNC: 12 U/L (ref 12–78)
ANION GAP SERPL CALCULATED.3IONS-SCNC: 3 MMOL/L (ref 4–13)
AST SERPL W P-5'-P-CCNC: 9 U/L (ref 5–45)
BILIRUB SERPL-MCNC: 0.29 MG/DL (ref 0.2–1)
BUN SERPL-MCNC: 20 MG/DL (ref 5–25)
CALCIUM SERPL-MCNC: 8.9 MG/DL (ref 8.3–10.1)
CHLORIDE SERPL-SCNC: 110 MMOL/L (ref 100–108)
CHOLEST SERPL-MCNC: 151 MG/DL (ref 50–200)
CO2 SERPL-SCNC: 28 MMOL/L (ref 21–32)
CREAT SERPL-MCNC: 0.97 MG/DL (ref 0.6–1.3)
GFR SERPL CREATININE-BSD FRML MDRD: 49 ML/MIN/1.73SQ M
GLUCOSE P FAST SERPL-MCNC: 99 MG/DL (ref 65–99)
HDLC SERPL-MCNC: 57 MG/DL
LDLC SERPL CALC-MCNC: 73 MG/DL (ref 0–100)
NONHDLC SERPL-MCNC: 94 MG/DL
POTASSIUM SERPL-SCNC: 4.3 MMOL/L (ref 3.5–5.3)
PROT SERPL-MCNC: 6.1 G/DL (ref 6.4–8.2)
SODIUM SERPL-SCNC: 141 MMOL/L (ref 136–145)
TRIGL SERPL-MCNC: 103 MG/DL

## 2020-02-13 PROCEDURE — 99214 OFFICE O/P EST MOD 30 MIN: CPT | Performed by: INTERNAL MEDICINE

## 2020-02-13 PROCEDURE — 93000 ELECTROCARDIOGRAM COMPLETE: CPT | Performed by: INTERNAL MEDICINE

## 2020-02-13 PROCEDURE — 80053 COMPREHEN METABOLIC PANEL: CPT | Performed by: INTERNAL MEDICINE

## 2020-02-13 PROCEDURE — 36415 COLL VENOUS BLD VENIPUNCTURE: CPT | Performed by: INTERNAL MEDICINE

## 2020-02-13 PROCEDURE — 80061 LIPID PANEL: CPT | Performed by: INTERNAL MEDICINE

## 2020-02-13 NOTE — TELEPHONE ENCOUNTER
----- Message from Yovany García MD sent at 2/13/2020  3:44 PM EST -----  Notify patient or Dr Arun Siegel that chemistry survey and lipid panel were both excellent on 02/13/2020  I have no special concerns

## 2020-02-13 NOTE — PROGRESS NOTES
Office Cardiology Progress Note  Mario Owens 80 y o  female MRN: [id number]  02/13/20  11:54 AM      ASSESSMENT:    1   Currently controlled systolic essential hypertension,  with patient no longer taking amlodipine 5 mg daily originally started about 2 years  ago  2  Stable CAD with old inferior infarct and PCI of RCA 11/18/13 but resolved EKG changes of inferolateral myocardial infarction since one year ago  3  Murmurs of aortic valve stenosis and mitral regurgitation with known mild aortic valve stenosis and MAC/1+ MR as well as 2+ TR  on 04/03/2018 echocardiogram     4  CKD 3 with history of dehydration and YECENIA 3    5  Status post symptomatic bradycardia as metoprolol adverse drug reaction August, 2015    6  History of resolved aerophagia and questionable hematuria with food impaction in esophagus, hiatal hernia, and erosive esophagitis complicated by aspiration pneumonia with hospitalization 1/31-02/03/2019     7  History of noncritical disease of LAD and LCx on 11/18/13 cardiac catheterization with normal nuclear stress test on 3/13/17  8  Controlled  dyslipidemia  9  Osteoarthritis and questionable rheumatoid arthritis  10  Chronic PACs, present previously but not evident today  11  Anemia of chronic disease, previously exacerbated during hospital stay  12  History  of painful right medial ankle skin breakdown and superficial ulceration with serous drainage  No obvious evidence of infection at this time and resolution of ankle skin ulceration and drainage        13  16 6 pound weight loss in approximately 1-1/2+ years  14  Chronic easy fatigue and lack of energy  Plan       Patient Instructions     1  Continue current medications  2  Get lipid panel and CMP done today before going home  May use [facility] laboratory here in [place]  3  Cardiology follow-up approximately six months with EKG  HPI    This 80 y o  female  denies new cardiopulmonary and medical symptoms      She has no major pain anywhere  She does complain of continued progressive fatigue and lack of energy  For unclear reasons, the patient claims she no longer uses amlodipine  She uses a walker at home and a cane when she is out of the home  She is seen in follow-up of the below listed diagnoses  Encounter Diagnoses   Name Primary?  Essential hypertension Yes    Coronary artery disease involving native coronary artery of native heart without angina pectoris     Dyslipidemia     CAD S/P percutaneous coronary angioplasty     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     Mild aortic stenosis     Mild mitral regurgitation     Chronic kidney disease, stage III (moderate) ([facility])         Review of Systems    All other systems negative, except as noted in history of present illness    Historical Information   Past Medical History:   Diagnosis Date    Abnormal EKG     Aortic valve stenosis     Arthritis     CKD (chronic kidney disease), stage III ([facility] [address] )     Coronary artery disease     emergent PCI 11/18/13    Hypertension     Mitral and aortic regurgitation     Myocardial infarction ([facility] [address] ) 11/2013    with 1 stent     Past Surgical History:   Procedure Laterality Date    APPENDECTOMY      BILATERAL OOPHORECTOMY      CORONARY STENT PLACEMENT  2013    after MI (x1 stent)    DILATION AND CURETTAGE OF UTERUS      ESOPHAGUS FOREIGN BODY REMOVAL N/A 1/31/2019    Procedure: REMOVAL FOREIGN BODY ESOPHAGUS;  Surgeon: Yohana Tafoya MD;  [facility]; Service: Gastroenterology    JOINT REPLACEMENT Left     knee    AK XCAPSL CTRC RMVL INSJ IO LENS PROSTH W/O ECP Left 12/19/2016    Procedure: EXTRACTION EXTRACAPSULAR CATARACT PHACO INTRAOCULAR LENS (IOL); Surgeon: Enio Borrego MD;  Location: [facility] MAIN OR;  Service: Ophthalmology    AK XCAPSL CTRC RMVL INSJ IO LENS PROSTH W/O ECP Right 6/18/2018    Procedure: EXTRACTION EXTRACAPSULAR CATARACT PHACO INTRAOCULAR LENS (IOL);   Surgeon: Nilesh Mills Brenda Scales MD;  Location: [facility] MAIN OR;  Service: Ophthalmology    TONSILLECTOMY       Social History     Substance and Sexual Activity   Alcohol Use Yes    Comment: drinks whiskey 2 x week     Social History     Substance and Sexual Activity   Drug Use No     Social History     Tobacco Use   Smoking Status Never Smoker   Smokeless Tobacco Never Used       Family History:  Family History   Problem Relation Age of Onset    Heart disease Sister         enlarged heart         Meds/Allergies     Prior to Admission medications    Medication Sig Start Date End Date Taking? Authorizing Provider   aspirin 81 MG tablet Take 81 mg by mouth 2 (two) times a day   Yes Historical Provider, MD   losartan-hydrochlorothiazide (HYZAAR) 100-25 MG per tablet take 1 tablet by mouth every morning 2/2/20  Yes Ysabel Soria MD   Multiple Vitamins-Minerals (PRESERVISION AREDS) CAPS Take by mouth 7/25/17  Yes Historical Provider, MD   omeprazole (PriLOSEC) 40 MG capsule Take 1 capsule (40 mg total) by mouth daily 1/31/19  Yes Sawyer Abbott MD   simvastatin (ZOCOR) 20 mg tablet Take 1 tablet (20 mg total) by mouth daily at bedtime 7/12/19  Yes Ysabel Soria MD   amLODIPine (NORVASC) 5 mg tablet take 1 tablet by mouth once daily  Patient not taking: Reported on 2/13/2020 7/29/19 2/13/20  Ysabel Soria MD   collagenase Yuli Larson) ointment Apply topically daily  Patient not taking: Reported on 2/13/2020 9/4/19 2/13/20  Van Whiting, DO       Allergies   Allergen Reactions    Metoprolol Other (See Comments)     According to previous medical record, you developed bradycardia and pre-syncope in 2015 while on metoprolol ER 25mg, so please DO NOT take metoprolol or other beta blocker  Vitals:    02/13/20 1048   BP: 118/80   BP Location: Right arm   Patient Position: Sitting   Cuff Size: Standard   Pulse: 70   SpO2: 98%   Weight: 59 kg (130 lb)   Height: 5' 3 5" (1 613 m)       Body mass index is 22 67 kg/m²    2 pound weight loss in approximately six months    Physical Exam:    General Appearance:  Alert, cooperative, no distress, appears stated age   Head:  Normocephalic, without obvious abnormality, atraumatic   Eyes:  PERRL, conjunctiva/corneas clear, EOM's intact,   both eyes   Ears:  Normal TM's and external ear canals, both ears   Nose: Nares normal, septum midline, mucosa normal, no drainage or sinus tenderness   Throat: Lips, mucosa, and tongue normal; teeth and gums normal   Neck: Supple, symmetrical, trachea midline, no adenopathy, thyroid: not enlarged, symmetric, no tenderness/mass/nodules, no carotid bruit or JVD   Back:   Symmetric, no curvature, ROM normal, no CVA tenderness   Lungs:   Clear to auscultation bilaterally, respirations unlabored   Chest Wall:  No tenderness or deformity   Heart:  Regular rate and rhythm, S1, S2 normal, grade 2/6 aortic systolic ejection murmur, transmitted along right sternal border and slightly to left sternal border, manubrium, and bilateral carotid arteries with additional grade 2/6 apical systolic ejection murmur radiating to left lower sternal border, mid axillary line with intact A2 and no rub or gallop   Abdomen:   Soft, non-tender, bowel sounds active all four quadrants,  no masses, no organomegaly   Extremities: Extremities normal, atraumatic, no cyanosis or edema   Pulses: 2+ and symmetric   Skin: Skin showed normal color, texture, turgor and no rashes or lesions   Lymph nodes: Cervical, supraclavicular, and axillary nodes normal   Neurologic: Normal         Cardiographics    ECG 02/13/20:    Normal sinus rhythm  Lateral repolarization variant of normal  High QRS voltage created by inadvertent double standardization calibration    IMAGING:    No Chest XR results available for this patient            LAB REVIEW:      Lab Results   Component Value Date    SODIUM 142 02/03/2019    K 3 8 02/03/2019     02/03/2019    CO2 27 02/03/2019    BUN 19 02/03/2019    CREATININE 1 17 02/03/2019 GLUF 84 02/02/2019    CALCIUM 8 5 02/03/2019    AST 9 01/31/2019    ALT 14 01/31/2019    ALKPHOS 88 01/31/2019    EGFR 39 02/03/2019     Lab Results   Component Value Date    CHOLESTEROL 151 01/23/2019    CHOLESTEROL 194 05/30/2018    CHOLESTEROL 160 07/21/2017     Lab Results   Component Value Date    HDL 59 01/23/2019    HDL 46 05/30/2018    HDL 51 07/21/2017     No results found for: LDLCHOLEST  Lab Results   Component Value Date    LDLCALC 73 01/23/2019    LDLCALC 110 (H) 05/30/2018    LDLCALC 78 07/21/2017     No components found for: [facility]  Lab Results   Component Value Date    TRIG 94 01/23/2019    TRIG 191 (H) 05/30/2018    TRIG 153 (H) 07/21/2017               Fermin Lofton MD

## 2020-07-17 DIAGNOSIS — E78.5 DYSLIPIDEMIA: ICD-10-CM

## 2020-07-17 RX ORDER — SIMVASTATIN 20 MG
TABLET ORAL
Qty: 30 TABLET | Refills: 5 | Status: SHIPPED | OUTPATIENT
Start: 2020-07-17 | End: 2021-01-01 | Stop reason: HOSPADM

## 2020-07-25 DIAGNOSIS — I10 HYPERTENSION, UNSPECIFIED TYPE: ICD-10-CM

## 2020-07-25 DIAGNOSIS — I25.2 OLD MI (MYOCARDIAL INFARCTION): ICD-10-CM

## 2020-07-25 RX ORDER — LOSARTAN POTASSIUM AND HYDROCHLOROTHIAZIDE 25; 100 MG/1; MG/1
1 TABLET ORAL EVERY MORNING
Qty: 30 TABLET | Refills: 5 | Status: SHIPPED | OUTPATIENT
Start: 2020-07-25 | End: 2021-01-01 | Stop reason: SDUPTHER

## 2021-01-01 ENCOUNTER — HOSPITAL ENCOUNTER (EMERGENCY)
Facility: HOSPITAL | Age: 86
Discharge: HOME | DRG: 871 | End: 2021-12-01
Attending: EMERGENCY MEDICINE
Payer: COMMERCIAL

## 2021-01-01 ENCOUNTER — APPOINTMENT (INPATIENT)
Dept: NON INVASIVE DIAGNOSTICS | Facility: HOSPITAL | Age: 86
DRG: 521 | End: 2021-01-01
Payer: COMMERCIAL

## 2021-01-01 ENCOUNTER — APPOINTMENT (EMERGENCY)
Dept: RADIOLOGY | Facility: HOSPITAL | Age: 86
DRG: 521 | End: 2021-01-01
Payer: COMMERCIAL

## 2021-01-01 ENCOUNTER — APPOINTMENT (INPATIENT)
Dept: RADIOLOGY | Facility: HOSPITAL | Age: 86
DRG: 521 | End: 2021-01-01
Payer: COMMERCIAL

## 2021-01-01 ENCOUNTER — TELEPHONE (OUTPATIENT)
Dept: OBGYN CLINIC | Facility: HOSPITAL | Age: 86
End: 2021-01-01

## 2021-01-01 ENCOUNTER — ANESTHESIA EVENT (INPATIENT)
Dept: PERIOP | Facility: HOSPITAL | Age: 86
DRG: 521 | End: 2021-01-01
Payer: COMMERCIAL

## 2021-01-01 ENCOUNTER — APPOINTMENT (INPATIENT)
Dept: RADIOLOGY | Facility: HOSPITAL | Age: 86
DRG: 871 | End: 2021-01-01
Payer: COMMERCIAL

## 2021-01-01 ENCOUNTER — HOSPITAL ENCOUNTER (OUTPATIENT)
Facility: HOSPITAL | Age: 86
End: 2021-12-31
Attending: INTERNAL MEDICINE | Admitting: INTERNAL MEDICINE
Payer: COMMERCIAL

## 2021-01-01 ENCOUNTER — HOSPITAL ENCOUNTER (INPATIENT)
Facility: HOSPITAL | Age: 86
LOS: 8 days | Discharge: NURSING, REHAB OR LONG-TERM CARE | DRG: 871 | End: 2021-12-12
Attending: EMERGENCY MEDICINE | Admitting: STUDENT IN AN ORGANIZED HEALTH CARE EDUCATION/TRAINING PROGRAM
Payer: COMMERCIAL

## 2021-01-01 ENCOUNTER — APPOINTMENT (EMERGENCY)
Dept: RADIOLOGY | Facility: HOSPITAL | Age: 86
DRG: 871 | End: 2021-01-01
Payer: COMMERCIAL

## 2021-01-01 ENCOUNTER — ANESTHESIA (INPATIENT)
Dept: PERIOP | Facility: HOSPITAL | Age: 86
DRG: 521 | End: 2021-01-01
Payer: COMMERCIAL

## 2021-01-01 ENCOUNTER — HOSPITAL ENCOUNTER (INPATIENT)
Facility: HOSPITAL | Age: 86
LOS: 8 days | DRG: 871 | End: 2021-12-29
Attending: EMERGENCY MEDICINE | Admitting: INTERNAL MEDICINE
Payer: COMMERCIAL

## 2021-01-01 ENCOUNTER — HOSPITAL ENCOUNTER (INPATIENT)
Facility: HOSPITAL | Age: 86
LOS: 7 days | Discharge: NURSING, REHAB OR LONG-TERM CARE | DRG: 521 | End: 2021-11-23
Attending: EMERGENCY MEDICINE | Admitting: FAMILY MEDICINE
Payer: COMMERCIAL

## 2021-01-01 VITALS
RESPIRATION RATE: 20 BRPM | OXYGEN SATURATION: 90 % | WEIGHT: 148.59 LBS | HEART RATE: 76 BPM | TEMPERATURE: 96.4 F | BODY MASS INDEX: 26.33 KG/M2 | DIASTOLIC BLOOD PRESSURE: 47 MMHG | HEIGHT: 63 IN | SYSTOLIC BLOOD PRESSURE: 110 MMHG

## 2021-01-01 VITALS
HEART RATE: 74 BPM | OXYGEN SATURATION: 99 % | WEIGHT: 127.65 LBS | RESPIRATION RATE: 18 BRPM | BODY MASS INDEX: 22.62 KG/M2 | DIASTOLIC BLOOD PRESSURE: 55 MMHG | HEIGHT: 63 IN | TEMPERATURE: 97.3 F | SYSTOLIC BLOOD PRESSURE: 159 MMHG

## 2021-01-01 VITALS
HEART RATE: 76 BPM | DIASTOLIC BLOOD PRESSURE: 75 MMHG | SYSTOLIC BLOOD PRESSURE: 157 MMHG | TEMPERATURE: 96.3 F | HEIGHT: 63 IN | RESPIRATION RATE: 19 BRPM | BODY MASS INDEX: 26.45 KG/M2 | WEIGHT: 149.25 LBS | OXYGEN SATURATION: 89 %

## 2021-01-01 VITALS
DIASTOLIC BLOOD PRESSURE: 70 MMHG | OXYGEN SATURATION: 99 % | TEMPERATURE: 97.4 F | HEART RATE: 88 BPM | RESPIRATION RATE: 16 BRPM | SYSTOLIC BLOOD PRESSURE: 165 MMHG

## 2021-01-01 VITALS
RESPIRATION RATE: 17 BRPM | OXYGEN SATURATION: 94 % | SYSTOLIC BLOOD PRESSURE: 164 MMHG | TEMPERATURE: 98.1 F | HEART RATE: 86 BPM | DIASTOLIC BLOOD PRESSURE: 65 MMHG

## 2021-01-01 DIAGNOSIS — S73.005A CLOSED DISLOCATION OF LEFT HIP (HCC): Primary | ICD-10-CM

## 2021-01-01 DIAGNOSIS — S72.002A CLOSED FRACTURE OF LEFT HIP, INITIAL ENCOUNTER (HCC): Primary | ICD-10-CM

## 2021-01-01 DIAGNOSIS — B35.1 ONYCHOMYCOSIS: ICD-10-CM

## 2021-01-01 DIAGNOSIS — I63.9 ACUTE CVA (CEREBROVASCULAR ACCIDENT) (HCC): ICD-10-CM

## 2021-01-01 DIAGNOSIS — D72.829 LEUKOCYTOSIS: ICD-10-CM

## 2021-01-01 DIAGNOSIS — J18.9 PNEUMONIA: ICD-10-CM

## 2021-01-01 DIAGNOSIS — S72.009A FEMORAL NECK FRACTURE (HCC): ICD-10-CM

## 2021-01-01 DIAGNOSIS — N39.0 UTI (URINARY TRACT INFECTION): ICD-10-CM

## 2021-01-01 DIAGNOSIS — R65.20 SEVERE SEPSIS (HCC): ICD-10-CM

## 2021-01-01 DIAGNOSIS — A41.9 SEVERE SEPSIS (HCC): ICD-10-CM

## 2021-01-01 DIAGNOSIS — J18.9 PNEUMONIA: Primary | ICD-10-CM

## 2021-01-01 DIAGNOSIS — R29.90 STROKE-LIKE SYMPTOMS: ICD-10-CM

## 2021-01-01 DIAGNOSIS — Z23 ENCOUNTER FOR IMMUNIZATION: ICD-10-CM

## 2021-01-01 DIAGNOSIS — J96.00 ACUTE RESPIRATORY FAILURE (HCC): ICD-10-CM

## 2021-01-01 DIAGNOSIS — J96.90 RESPIRATORY FAILURE (HCC): ICD-10-CM

## 2021-01-01 DIAGNOSIS — D64.9 ANEMIA: ICD-10-CM
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
## 2021-01-01 DIAGNOSIS — I25.2 OLD MI (MYOCARDIAL INFARCTION): ICD-10-CM

## 2021-01-01 DIAGNOSIS — A41.9 SEPSIS (HCC): ICD-10-CM

## 2021-01-01 DIAGNOSIS — R29.898 LEFT ARM WEAKNESS: ICD-10-CM

## 2021-01-01 DIAGNOSIS — R41.82 AMS (ALTERED MENTAL STATUS): Primary | ICD-10-CM

## 2021-01-01 DIAGNOSIS — I48.91 ATRIAL FIBRILLATION WITH RAPID VENTRICULAR RESPONSE (HCC): ICD-10-CM

## 2021-01-01 DIAGNOSIS — J69.0 ASPIRATION PNEUMONIA DUE TO REGURGITATED FOOD, UNSPECIFIED LATERALITY, UNSPECIFIED PART OF LUNG (HCC): ICD-10-CM

## 2021-01-01 DIAGNOSIS — Z01.810 ENCOUNTER FOR PRE-OPERATIVE CARDIOVASCULAR CLEARANCE: ICD-10-CM

## 2021-01-01 DIAGNOSIS — I10 HYPERTENSION, UNSPECIFIED TYPE: ICD-10-CM

## 2021-01-01 DIAGNOSIS — S72.002A CLOSED FRACTURE OF LEFT HIP, INITIAL ENCOUNTER (HCC): ICD-10-CM

## 2021-01-01 LAB
25(OH)D2 SERPL-MCNC: <1 NG/ML
25(OH)D3 SERPL-MCNC: 5.5 NG/ML
25(OH)D3+25(OH)D2 SERPL-MCNC: 5.5 NG/ML
2HR DELTA HS TROPONIN: 1 NG/L
2HR DELTA HS TROPONIN: 7 NG/L
4HR DELTA HS TROPONIN: -2 NG/L
4HR DELTA HS TROPONIN: 24 NG/L
ABO GROUP BLD BPU: NORMAL
ABO GROUP BLD: NORMAL
ABO GROUP BLD: NORMAL
ALBUMIN SERPL BCP-MCNC: 1.5 G/DL (ref 3.5–5)
ALBUMIN SERPL BCP-MCNC: 2 G/DL (ref 3.5–5)
ALBUMIN SERPL BCP-MCNC: 2.2 G/DL (ref 3.5–5)
ALBUMIN SERPL BCP-MCNC: 2.6 G/DL (ref 3.5–5)
ALBUMIN SERPL BCP-MCNC: 3.2 G/DL (ref 3.5–5)
ALP SERPL-CCNC: 72 U/L (ref 46–116)
ALP SERPL-CCNC: 77 U/L (ref 46–116)
ALP SERPL-CCNC: 87 U/L (ref 46–116)
ALP SERPL-CCNC: 87 U/L (ref 46–116)
ALP SERPL-CCNC: 99 U/L (ref 46–116)
ALT SERPL W P-5'-P-CCNC: 17 U/L (ref 12–78)
ALT SERPL W P-5'-P-CCNC: 17 U/L (ref 12–78)
ALT SERPL W P-5'-P-CCNC: 22 U/L (ref 12–78)
ALT SERPL W P-5'-P-CCNC: 22 U/L (ref 12–78)
ALT SERPL W P-5'-P-CCNC: 24 U/L (ref 12–78)
ANION GAP SERPL CALCULATED.3IONS-SCNC: 10 MMOL/L (ref 4–13)
ANION GAP SERPL CALCULATED.3IONS-SCNC: 11 MMOL/L (ref 4–13)
ANION GAP SERPL CALCULATED.3IONS-SCNC: 12 MMOL/L (ref 4–13)
ANION GAP SERPL CALCULATED.3IONS-SCNC: 12 MMOL/L (ref 4–13)
ANION GAP SERPL CALCULATED.3IONS-SCNC: 14 MMOL/L (ref 4–13)
ANION GAP SERPL CALCULATED.3IONS-SCNC: 14 MMOL/L (ref 4–13)
ANION GAP SERPL CALCULATED.3IONS-SCNC: 5 MMOL/L (ref 4–13)
ANION GAP SERPL CALCULATED.3IONS-SCNC: 6 MMOL/L (ref 4–13)
ANION GAP SERPL CALCULATED.3IONS-SCNC: 7 MMOL/L (ref 4–13)
ANION GAP SERPL CALCULATED.3IONS-SCNC: 7 MMOL/L (ref 4–13)
ANION GAP SERPL CALCULATED.3IONS-SCNC: 8 MMOL/L (ref 4–13)
ANION GAP SERPL CALCULATED.3IONS-SCNC: 8 MMOL/L (ref 4–13)
ANION GAP SERPL CALCULATED.3IONS-SCNC: 9 MMOL/L (ref 4–13)
ANISOCYTOSIS BLD QL SMEAR: PRESENT
AORTIC ROOT: 2.6 CM
AORTIC VALVE MEAN VELOCITY: 18.6 M/S
APICAL FOUR CHAMBER EJECTION FRACTION: 57 %
APTT PPP: 125 SECONDS (ref 23–37)
APTT PPP: 31 SECONDS (ref 23–37)
APTT PPP: 32 SECONDS (ref 23–37)
APTT PPP: 44 SECONDS (ref 23–37)
APTT PPP: 46 SECONDS (ref 23–37)
APTT PPP: 47 SECONDS (ref 23–37)
APTT PPP: 94 SECONDS (ref 23–37)
ARTERIAL PATENCY WRIST A: YES
AST SERPL W P-5'-P-CCNC: 16 U/L (ref 5–45)
AST SERPL W P-5'-P-CCNC: 20 U/L (ref 5–45)
AST SERPL W P-5'-P-CCNC: 22 U/L (ref 5–45)
AST SERPL W P-5'-P-CCNC: 33 U/L (ref 5–45)
AST SERPL W P-5'-P-CCNC: 37 U/L (ref 5–45)
ATRIAL RATE: 107 BPM
ATRIAL RATE: 110 BPM
ATRIAL RATE: 111 BPM
ATRIAL RATE: 113 BPM
ATRIAL RATE: 119 BPM
ATRIAL RATE: 72 BPM
ATRIAL RATE: 73 BPM
ATRIAL RATE: 74 BPM
ATRIAL RATE: 75 BPM
ATRIAL RATE: 75 BPM
ATRIAL RATE: 76 BPM
ATRIAL RATE: 80 BPM
ATRIAL RATE: 82 BPM
ATRIAL RATE: 85 BPM
ATRIAL RATE: 93 BPM
AV AREA BY CONTINUOUS VTI: 1.1 CM2
AV AREA PEAK VELOCITY: 1.2 CM2
AV LVOT MEAN GRADIENT: 3 MMHG
AV LVOT PEAK GRADIENT: 5 MMHG
AV MEAN GRADIENT: 17 MMHG
AV PEAK GRADIENT: 30 MMHG
AV VALVE AREA: 1.05 CM2
BACTERIA BLD CULT: ABNORMAL
BACTERIA BLD CULT: NORMAL
BACTERIA UR CULT: ABNORMAL
BACTERIA UR QL AUTO: ABNORMAL /HPF
BACTERIA UR QL AUTO: ABNORMAL /HPF
BACTERIA WND AEROBE CULT: NORMAL
BASE EX.OXY STD BLDV CALC-SCNC: 70.3 % (ref 60–80)
BASE EXCESS BLDA CALC-SCNC: -2.8 MMOL/L
BASE EXCESS BLDV CALC-SCNC: -2.3 MMOL/L
BASOPHILS # BLD AUTO: 0.03 THOUSANDS/ΜL (ref 0–0.1)
BASOPHILS # BLD AUTO: 0.04 THOUSANDS/ΜL (ref 0–0.1)
BASOPHILS # BLD AUTO: 0.05 THOUSANDS/ΜL (ref 0–0.1)
BASOPHILS # BLD AUTO: 0.07 THOUSANDS/ΜL (ref 0–0.1)
BASOPHILS # BLD AUTO: 0.08 THOUSANDS/ΜL (ref 0–0.1)
BASOPHILS # BLD MANUAL: 0 THOUSAND/UL (ref 0–0.1)
BASOPHILS # BLD MANUAL: 0.16 THOUSAND/UL (ref 0–0.1)
BASOPHILS NFR BLD AUTO: 0 % (ref 0–1)
BASOPHILS NFR MAR MANUAL: 0 % (ref 0–1)
BASOPHILS NFR MAR MANUAL: 1 % (ref 0–1)
BILIRUB SERPL-MCNC: 0.46 MG/DL (ref 0.2–1)
BILIRUB SERPL-MCNC: 0.49 MG/DL (ref 0.2–1)
BILIRUB SERPL-MCNC: 0.56 MG/DL (ref 0.2–1)
BILIRUB SERPL-MCNC: 0.66 MG/DL (ref 0.2–1)
BILIRUB SERPL-MCNC: 0.8 MG/DL (ref 0.2–1)
BILIRUB UR QL STRIP: NEGATIVE
BILIRUB UR QL STRIP: NEGATIVE
BLD GP AB SCN SERPL QL: NEGATIVE
BLD GP AB SCN SERPL QL: NEGATIVE
BODY TEMPERATURE: 97.5 DEGREES FEHRENHEIT
BPU ID: NORMAL
BUN SERPL-MCNC: 10 MG/DL (ref 5–25)
BUN SERPL-MCNC: 11 MG/DL (ref 5–25)
BUN SERPL-MCNC: 14 MG/DL (ref 5–25)
BUN SERPL-MCNC: 15 MG/DL (ref 5–25)
BUN SERPL-MCNC: 16 MG/DL (ref 5–25)
BUN SERPL-MCNC: 16 MG/DL (ref 5–25)
BUN SERPL-MCNC: 17 MG/DL (ref 5–25)
BUN SERPL-MCNC: 18 MG/DL (ref 5–25)
BUN SERPL-MCNC: 18 MG/DL (ref 5–25)
BUN SERPL-MCNC: 19 MG/DL (ref 5–25)
BUN SERPL-MCNC: 20 MG/DL (ref 5–25)
BUN SERPL-MCNC: 21 MG/DL (ref 5–25)
BUN SERPL-MCNC: 25 MG/DL (ref 5–25)
BUN SERPL-MCNC: 25 MG/DL (ref 5–25)
BUN SERPL-MCNC: 27 MG/DL (ref 5–25)
BUN SERPL-MCNC: 28 MG/DL (ref 5–25)
BUN SERPL-MCNC: 31 MG/DL (ref 5–25)
BUN SERPL-MCNC: 32 MG/DL (ref 5–25)
CALCIUM ALBUM COR SERPL-MCNC: 10.2 MG/DL (ref 8.3–10.1)
CALCIUM ALBUM COR SERPL-MCNC: 10.4 MG/DL (ref 8.3–10.1)
CALCIUM ALBUM COR SERPL-MCNC: 9.7 MG/DL (ref 8.3–10.1)
CALCIUM ALBUM COR SERPL-MCNC: 9.7 MG/DL (ref 8.3–10.1)
CALCIUM ALBUM COR SERPL-MCNC: 9.8 MG/DL (ref 8.3–10.1)
CALCIUM SERPL-MCNC: 7.5 MG/DL (ref 8.3–10.1)
CALCIUM SERPL-MCNC: 7.8 MG/DL (ref 8.3–10.1)
CALCIUM SERPL-MCNC: 7.8 MG/DL (ref 8.3–10.1)
CALCIUM SERPL-MCNC: 8 MG/DL (ref 8.3–10.1)
CALCIUM SERPL-MCNC: 8.1 MG/DL (ref 8.3–10.1)
CALCIUM SERPL-MCNC: 8.2 MG/DL (ref 8.3–10.1)
CALCIUM SERPL-MCNC: 8.3 MG/DL (ref 8.3–10.1)
CALCIUM SERPL-MCNC: 8.3 MG/DL (ref 8.3–10.1)
CALCIUM SERPL-MCNC: 8.4 MG/DL (ref 8.3–10.1)
CALCIUM SERPL-MCNC: 8.4 MG/DL (ref 8.3–10.1)
CALCIUM SERPL-MCNC: 8.6 MG/DL (ref 8.3–10.1)
CALCIUM SERPL-MCNC: 8.7 MG/DL (ref 8.3–10.1)
CALCIUM SERPL-MCNC: 8.7 MG/DL (ref 8.3–10.1)
CALCIUM SERPL-MCNC: 8.8 MG/DL (ref 8.3–10.1)
CALCIUM SERPL-MCNC: 9 MG/DL (ref 8.3–10.1)
CALCIUM SERPL-MCNC: 9 MG/DL (ref 8.3–10.1)
CALCIUM SERPL-MCNC: 9.1 MG/DL (ref 8.3–10.1)
CARDIAC TROPONIN I PNL SERPL HS: 14 NG/L
CARDIAC TROPONIN I PNL SERPL HS: 16 NG/L
CARDIAC TROPONIN I PNL SERPL HS: 16 NG/L
CARDIAC TROPONIN I PNL SERPL HS: 17 NG/L
CARDIAC TROPONIN I PNL SERPL HS: 23 NG/L
CARDIAC TROPONIN I PNL SERPL HS: 40 NG/L
CHLORIDE SERPL-SCNC: 101 MMOL/L (ref 100–108)
CHLORIDE SERPL-SCNC: 102 MMOL/L (ref 100–108)
CHLORIDE SERPL-SCNC: 103 MMOL/L (ref 100–108)
CHLORIDE SERPL-SCNC: 103 MMOL/L (ref 100–108)
CHLORIDE SERPL-SCNC: 104 MMOL/L (ref 100–108)
CHLORIDE SERPL-SCNC: 105 MMOL/L (ref 100–108)
CHLORIDE SERPL-SCNC: 106 MMOL/L (ref 100–108)
CHLORIDE SERPL-SCNC: 107 MMOL/L (ref 100–108)
CHLORIDE SERPL-SCNC: 108 MMOL/L (ref 100–108)
CHLORIDE SERPL-SCNC: 108 MMOL/L (ref 100–108)
CHLORIDE SERPL-SCNC: 109 MMOL/L (ref 100–108)
CHLORIDE SERPL-SCNC: 110 MMOL/L (ref 100–108)
CHLORIDE SERPL-SCNC: 111 MMOL/L (ref 100–108)
CHLORIDE SERPL-SCNC: 111 MMOL/L (ref 100–108)
CHLORIDE SERPL-SCNC: 113 MMOL/L (ref 100–108)
CHLORIDE SERPL-SCNC: 114 MMOL/L (ref 100–108)
CHOLEST SERPL-MCNC: 94 MG/DL
CK MB SERPL-MCNC: 2 % (ref 0–2.5)
CK MB SERPL-MCNC: 2.8 NG/ML (ref 0–5)
CK SERPL-CCNC: 137 U/L (ref 26–192)
CLARITY UR: ABNORMAL
CLARITY UR: CLEAR
CO2 SERPL-SCNC: 20 MMOL/L (ref 21–32)
CO2 SERPL-SCNC: 22 MMOL/L (ref 21–32)
CO2 SERPL-SCNC: 23 MMOL/L (ref 21–32)
CO2 SERPL-SCNC: 24 MMOL/L (ref 21–32)
CO2 SERPL-SCNC: 24 MMOL/L (ref 21–32)
CO2 SERPL-SCNC: 25 MMOL/L (ref 21–32)
CO2 SERPL-SCNC: 25 MMOL/L (ref 21–32)
CO2 SERPL-SCNC: 26 MMOL/L (ref 21–32)
CO2 SERPL-SCNC: 27 MMOL/L (ref 21–32)
CO2 SERPL-SCNC: 28 MMOL/L (ref 21–32)
COLOR UR: YELLOW
COLOR UR: YELLOW
CREAT SERPL-MCNC: 0.83 MG/DL (ref 0.6–1.3)
CREAT SERPL-MCNC: 0.83 MG/DL (ref 0.6–1.3)
CREAT SERPL-MCNC: 0.89 MG/DL (ref 0.6–1.3)
CREAT SERPL-MCNC: 0.89 MG/DL (ref 0.6–1.3)
CREAT SERPL-MCNC: 0.9 MG/DL (ref 0.6–1.3)
CREAT SERPL-MCNC: 0.91 MG/DL (ref 0.6–1.3)
CREAT SERPL-MCNC: 0.93 MG/DL (ref 0.6–1.3)
CREAT SERPL-MCNC: 0.96 MG/DL (ref 0.6–1.3)
CREAT SERPL-MCNC: 1.07 MG/DL (ref 0.6–1.3)
CREAT SERPL-MCNC: 1.08 MG/DL (ref 0.6–1.3)
CREAT SERPL-MCNC: 1.09 MG/DL (ref 0.6–1.3)
CREAT SERPL-MCNC: 1.11 MG/DL (ref 0.6–1.3)
CREAT SERPL-MCNC: 1.12 MG/DL (ref 0.6–1.3)
CREAT SERPL-MCNC: 1.15 MG/DL (ref 0.6–1.3)
CREAT SERPL-MCNC: 1.17 MG/DL (ref 0.6–1.3)
CREAT SERPL-MCNC: 1.22 MG/DL (ref 0.6–1.3)
CREAT SERPL-MCNC: 1.28 MG/DL (ref 0.6–1.3)
CREAT SERPL-MCNC: 1.37 MG/DL (ref 0.6–1.3)
CREAT SERPL-MCNC: 1.37 MG/DL (ref 0.6–1.3)
CREAT SERPL-MCNC: 1.54 MG/DL (ref 0.6–1.3)
CROSSMATCH: NORMAL
DOP CALC AO VTI: 62.25 CM
DOP CALC LVOT AREA: 2.83 CM2
DOP CALC LVOT DIAMETER: 1.9 CM
DOP CALC LVOT PEAK VEL VTI: 23.11 CM
DOP CALC LVOT PEAK VEL: 1.14 M/S
DOP CALC LVOT STROKE INDEX: 41 ML/M2
DOP CALC LVOT STROKE VOLUME: 65.49 CM3
E WAVE DECELERATION TIME: 206 MS
EOSINOPHIL # BLD AUTO: 0 THOUSAND/ΜL (ref 0–0.61)
EOSINOPHIL # BLD AUTO: 0.04 THOUSAND/ΜL (ref 0–0.61)
EOSINOPHIL # BLD AUTO: 0.05 THOUSAND/ΜL (ref 0–0.61)
EOSINOPHIL # BLD AUTO: 0.07 THOUSAND/ΜL (ref 0–0.61)
EOSINOPHIL # BLD AUTO: 0.07 THOUSAND/ΜL (ref 0–0.61)
EOSINOPHIL # BLD MANUAL: 0 THOUSAND/UL (ref 0–0.4)
EOSINOPHIL # BLD MANUAL: 0.16 THOUSAND/UL (ref 0–0.4)
EOSINOPHIL # BLD MANUAL: 0.18 THOUSAND/UL (ref 0–0.4)
EOSINOPHIL # BLD MANUAL: 0.2 THOUSAND/UL (ref 0–0.4)
EOSINOPHIL # BLD MANUAL: 0.25 THOUSAND/UL (ref 0–0.4)
EOSINOPHIL # BLD MANUAL: 0.43 THOUSAND/UL (ref 0–0.4)
EOSINOPHIL NFR BLD AUTO: 0 % (ref 0–6)
EOSINOPHIL NFR BLD AUTO: 1 % (ref 0–6)
EOSINOPHIL NFR BLD MANUAL: 0 % (ref 0–6)
EOSINOPHIL NFR BLD MANUAL: 1 % (ref 0–6)
EOSINOPHIL NFR BLD MANUAL: 2 % (ref 0–6)
EOSINOPHIL NFR BLD MANUAL: 2 % (ref 0–6)
ERYTHROCYTE [DISTWIDTH] IN BLOOD BY AUTOMATED COUNT: 13.2 % (ref 11.6–15.1)
ERYTHROCYTE [DISTWIDTH] IN BLOOD BY AUTOMATED COUNT: 13.5 % (ref 11.6–15.1)
ERYTHROCYTE [DISTWIDTH] IN BLOOD BY AUTOMATED COUNT: 13.5 % (ref 11.6–15.1)
ERYTHROCYTE [DISTWIDTH] IN BLOOD BY AUTOMATED COUNT: 13.7 % (ref 11.6–15.1)
ERYTHROCYTE [DISTWIDTH] IN BLOOD BY AUTOMATED COUNT: 14.8 % (ref 11.6–15.1)
ERYTHROCYTE [DISTWIDTH] IN BLOOD BY AUTOMATED COUNT: 15.1 % (ref 11.6–15.1)
ERYTHROCYTE [DISTWIDTH] IN BLOOD BY AUTOMATED COUNT: 15.2 % (ref 11.6–15.1)
ERYTHROCYTE [DISTWIDTH] IN BLOOD BY AUTOMATED COUNT: 15.3 % (ref 11.6–15.1)
ERYTHROCYTE [DISTWIDTH] IN BLOOD BY AUTOMATED COUNT: 16.1 % (ref 11.6–15.1)
ERYTHROCYTE [DISTWIDTH] IN BLOOD BY AUTOMATED COUNT: 16.1 % (ref 11.6–15.1)
ERYTHROCYTE [DISTWIDTH] IN BLOOD BY AUTOMATED COUNT: 16.2 % (ref 11.6–15.1)
ERYTHROCYTE [DISTWIDTH] IN BLOOD BY AUTOMATED COUNT: 16.6 % (ref 11.6–15.1)
ERYTHROCYTE [DISTWIDTH] IN BLOOD BY AUTOMATED COUNT: 16.8 % (ref 11.6–15.1)
ERYTHROCYTE [DISTWIDTH] IN BLOOD BY AUTOMATED COUNT: 16.8 % (ref 11.6–15.1)
ERYTHROCYTE [DISTWIDTH] IN BLOOD BY AUTOMATED COUNT: 16.9 % (ref 11.6–15.1)
ERYTHROCYTE [DISTWIDTH] IN BLOOD BY AUTOMATED COUNT: 17.1 % (ref 11.6–15.1)
ERYTHROCYTE [DISTWIDTH] IN BLOOD BY AUTOMATED COUNT: 17.3 % (ref 11.6–15.1)
ERYTHROCYTE [DISTWIDTH] IN BLOOD BY AUTOMATED COUNT: 17.4 % (ref 11.6–15.1)
ERYTHROCYTE [DISTWIDTH] IN BLOOD BY AUTOMATED COUNT: 17.5 % (ref 11.6–15.1)
ERYTHROCYTE [DISTWIDTH] IN BLOOD BY AUTOMATED COUNT: 17.5 % (ref 11.6–15.1)
ERYTHROCYTE [DISTWIDTH] IN BLOOD BY AUTOMATED COUNT: 17.7 % (ref 11.6–15.1)
ERYTHROCYTE [DISTWIDTH] IN BLOOD BY AUTOMATED COUNT: 17.8 % (ref 11.6–15.1)
ERYTHROCYTE [DISTWIDTH] IN BLOOD BY AUTOMATED COUNT: 18 % (ref 11.6–15.1)
EST. AVERAGE GLUCOSE BLD GHB EST-MCNC: 103 MG/DL
FERRITIN SERPL-MCNC: 795 NG/ML (ref 8–388)
FLUAV RNA RESP QL NAA+PROBE: NEGATIVE
FLUBV RNA RESP QL NAA+PROBE: NEGATIVE
FOLATE SERPL-MCNC: 5.5 NG/ML (ref 3.1–17.5)
FRACTIONAL SHORTENING: 28 % (ref 28–44)
GFR SERPL CREATININE-BSD FRML MDRD: 28 ML/MIN/1.73SQ M
GFR SERPL CREATININE-BSD FRML MDRD: 32 ML/MIN/1.73SQ M
GFR SERPL CREATININE-BSD FRML MDRD: 32 ML/MIN/1.73SQ M
GFR SERPL CREATININE-BSD FRML MDRD: 34 ML/MIN/1.73SQ M
GFR SERPL CREATININE-BSD FRML MDRD: 37 ML/MIN/1.73SQ M
GFR SERPL CREATININE-BSD FRML MDRD: 39 ML/MIN/1.73SQ M
GFR SERPL CREATININE-BSD FRML MDRD: 39 ML/MIN/1.73SQ M
GFR SERPL CREATININE-BSD FRML MDRD: 40 ML/MIN/1.73SQ M
GFR SERPL CREATININE-BSD FRML MDRD: 41 ML/MIN/1.73SQ M
GFR SERPL CREATININE-BSD FRML MDRD: 42 ML/MIN/1.73SQ M
GFR SERPL CREATININE-BSD FRML MDRD: 43 ML/MIN/1.73SQ M
GFR SERPL CREATININE-BSD FRML MDRD: 43 ML/MIN/1.73SQ M
GFR SERPL CREATININE-BSD FRML MDRD: 49 ML/MIN/1.73SQ M
GFR SERPL CREATININE-BSD FRML MDRD: 51 ML/MIN/1.73SQ M
GFR SERPL CREATININE-BSD FRML MDRD: 53 ML/MIN/1.73SQ M
GFR SERPL CREATININE-BSD FRML MDRD: 53 ML/MIN/1.73SQ M
GFR SERPL CREATININE-BSD FRML MDRD: 54 ML/MIN/1.73SQ M
GFR SERPL CREATININE-BSD FRML MDRD: 54 ML/MIN/1.73SQ M
GFR SERPL CREATININE-BSD FRML MDRD: 58 ML/MIN/1.73SQ M
GFR SERPL CREATININE-BSD FRML MDRD: 59 ML/MIN/1.73SQ M
GIANT PLATELETS BLD QL SMEAR: PRESENT
GLUCOSE SERPL-MCNC: 109 MG/DL (ref 65–140)
GLUCOSE SERPL-MCNC: 110 MG/DL (ref 65–140)
GLUCOSE SERPL-MCNC: 112 MG/DL (ref 65–140)
GLUCOSE SERPL-MCNC: 113 MG/DL (ref 65–140)
GLUCOSE SERPL-MCNC: 115 MG/DL (ref 65–140)
GLUCOSE SERPL-MCNC: 116 MG/DL (ref 65–140)
GLUCOSE SERPL-MCNC: 117 MG/DL (ref 65–140)
GLUCOSE SERPL-MCNC: 118 MG/DL (ref 65–140)
GLUCOSE SERPL-MCNC: 118 MG/DL (ref 65–140)
GLUCOSE SERPL-MCNC: 125 MG/DL (ref 65–140)
GLUCOSE SERPL-MCNC: 129 MG/DL (ref 65–140)
GLUCOSE SERPL-MCNC: 160 MG/DL (ref 65–140)
GLUCOSE SERPL-MCNC: 50 MG/DL (ref 65–140)
GLUCOSE SERPL-MCNC: 67 MG/DL (ref 65–140)
GLUCOSE SERPL-MCNC: 70 MG/DL (ref 65–140)
GLUCOSE SERPL-MCNC: 76 MG/DL (ref 65–140)
GLUCOSE SERPL-MCNC: 78 MG/DL (ref 65–140)
GLUCOSE SERPL-MCNC: 83 MG/DL (ref 65–140)
GLUCOSE SERPL-MCNC: 88 MG/DL (ref 65–140)
GLUCOSE SERPL-MCNC: 91 MG/DL (ref 65–140)
GLUCOSE SERPL-MCNC: 92 MG/DL (ref 65–140)
GLUCOSE SERPL-MCNC: 92 MG/DL (ref 65–140)
GLUCOSE SERPL-MCNC: 94 MG/DL (ref 65–140)
GLUCOSE SERPL-MCNC: 94 MG/DL (ref 65–140)
GLUCOSE SERPL-MCNC: 96 MG/DL (ref 65–140)
GLUCOSE SERPL-MCNC: 98 MG/DL (ref 65–140)
GLUCOSE UR STRIP-MCNC: NEGATIVE MG/DL
GLUCOSE UR STRIP-MCNC: NEGATIVE MG/DL
GRAM STN SPEC: ABNORMAL
GRAM STN SPEC: NORMAL
HBA1C MFR BLD: 5.2 %
HCO3 BLDA-SCNC: 19.8 MMOL/L (ref 22–28)
HCO3 BLDV-SCNC: 23 MMOL/L (ref 24–30)
HCT VFR BLD AUTO: 20.3 % (ref 34.8–46.1)
HCT VFR BLD AUTO: 21.7 % (ref 34.8–46.1)
HCT VFR BLD AUTO: 22.8 % (ref 34.8–46.1)
HCT VFR BLD AUTO: 24.3 % (ref 34.8–46.1)
HCT VFR BLD AUTO: 25.4 % (ref 34.8–46.1)
HCT VFR BLD AUTO: 26.1 % (ref 34.8–46.1)
HCT VFR BLD AUTO: 27 % (ref 34.8–46.1)
HCT VFR BLD AUTO: 27.1 % (ref 34.8–46.1)
HCT VFR BLD AUTO: 27.2 % (ref 34.8–46.1)
HCT VFR BLD AUTO: 27.6 % (ref 34.8–46.1)
HCT VFR BLD AUTO: 27.8 % (ref 34.8–46.1)
HCT VFR BLD AUTO: 28.1 % (ref 34.8–46.1)
HCT VFR BLD AUTO: 29.1 % (ref 34.8–46.1)
HCT VFR BLD AUTO: 30.1 % (ref 34.8–46.1)
HCT VFR BLD AUTO: 30.2 % (ref 34.8–46.1)
HCT VFR BLD AUTO: 30.2 % (ref 34.8–46.1)
HCT VFR BLD AUTO: 30.7 % (ref 34.8–46.1)
HCT VFR BLD AUTO: 31.7 % (ref 34.8–46.1)
HCT VFR BLD AUTO: 31.8 % (ref 34.8–46.1)
HCT VFR BLD AUTO: 32 % (ref 34.8–46.1)
HCT VFR BLD AUTO: 32 % (ref 34.8–46.1)
HCT VFR BLD AUTO: 32.5 % (ref 34.8–46.1)
HCT VFR BLD AUTO: 33 % (ref 34.8–46.1)
HCT VFR BLD AUTO: 33.5 % (ref 34.8–46.1)
HCT VFR BLD AUTO: 33.7 % (ref 34.8–46.1)
HCT VFR BLD AUTO: 36.6 % (ref 34.8–46.1)
HCT VFR BLD AUTO: 38.9 % (ref 34.8–46.1)
HDLC SERPL-MCNC: 38 MG/DL
HGB BLD-MCNC: 10 G/DL (ref 11.5–15.4)
HGB BLD-MCNC: 10.2 G/DL (ref 11.5–15.4)
HGB BLD-MCNC: 10.2 G/DL (ref 11.5–15.4)
HGB BLD-MCNC: 10.3 G/DL (ref 11.5–15.4)
HGB BLD-MCNC: 10.6 G/DL (ref 11.5–15.4)
HGB BLD-MCNC: 10.6 G/DL (ref 11.5–15.4)
HGB BLD-MCNC: 11.8 G/DL (ref 11.5–15.4)
HGB BLD-MCNC: 12 G/DL (ref 11.5–15.4)
HGB BLD-MCNC: 6.2 G/DL (ref 11.5–15.4)
HGB BLD-MCNC: 6.9 G/DL (ref 11.5–15.4)
HGB BLD-MCNC: 7.1 G/DL (ref 11.5–15.4)
HGB BLD-MCNC: 7.6 G/DL (ref 11.5–15.4)
HGB BLD-MCNC: 8 G/DL (ref 11.5–15.4)
HGB BLD-MCNC: 8.4 G/DL (ref 11.5–15.4)
HGB BLD-MCNC: 8.5 G/DL (ref 11.5–15.4)
HGB BLD-MCNC: 8.6 G/DL (ref 11.5–15.4)
HGB BLD-MCNC: 8.6 G/DL (ref 11.5–15.4)
HGB BLD-MCNC: 8.7 G/DL (ref 11.5–15.4)
HGB BLD-MCNC: 8.7 G/DL (ref 11.5–15.4)
HGB BLD-MCNC: 8.8 G/DL (ref 11.5–15.4)
HGB BLD-MCNC: 8.8 G/DL (ref 11.5–15.4)
HGB BLD-MCNC: 9.1 G/DL (ref 11.5–15.4)
HGB BLD-MCNC: 9.6 G/DL (ref 11.5–15.4)
HGB BLD-MCNC: 9.8 G/DL (ref 11.5–15.4)
HGB BLD-MCNC: 9.9 G/DL (ref 11.5–15.4)
HGB UR QL STRIP.AUTO: ABNORMAL
HGB UR QL STRIP.AUTO: NEGATIVE
IMM GRANULOCYTES # BLD AUTO: 0.14 THOUSAND/UL (ref 0–0.2)
IMM GRANULOCYTES # BLD AUTO: 0.3 THOUSAND/UL (ref 0–0.2)
IMM GRANULOCYTES # BLD AUTO: 0.31 THOUSAND/UL (ref 0–0.2)
IMM GRANULOCYTES # BLD AUTO: >0.5 THOUSAND/UL (ref 0–0.2)
IMM GRANULOCYTES # BLD AUTO: >0.5 THOUSAND/UL (ref 0–0.2)
IMM GRANULOCYTES NFR BLD AUTO: 1 % (ref 0–2)
IMM GRANULOCYTES NFR BLD AUTO: 2 % (ref 0–2)
IMM GRANULOCYTES NFR BLD AUTO: 2 % (ref 0–2)
IMM GRANULOCYTES NFR BLD AUTO: 3 % (ref 0–2)
IMM GRANULOCYTES NFR BLD AUTO: 6 % (ref 0–2)
INR PPP: 0.99 (ref 0.84–1.19)
INR PPP: 1.16 (ref 0.84–1.19)
INR PPP: 1.25 (ref 0.84–1.19)
INR PPP: 1.3 (ref 0.84–1.19)
INTERVENTRICULAR SEPTUM IN DIASTOLE (PARASTERNAL SHORT AXIS VIEW): 1.2 CM
IRON SATN MFR SERPL: 13 % (ref 15–50)
IRON SERPL-MCNC: 18 UG/DL (ref 50–170)
KETONES UR STRIP-MCNC: ABNORMAL MG/DL
KETONES UR STRIP-MCNC: NEGATIVE MG/DL
L PNEUMO1 AG UR QL IA.RAPID: NEGATIVE
LACTATE SERPL-SCNC: 1.7 MMOL/L (ref 0.5–2)
LACTATE SERPL-SCNC: 2.5 MMOL/L (ref 0.5–2)
LACTATE SERPL-SCNC: 3.2 MMOL/L (ref 0.5–2)
LACTATE SERPL-SCNC: 3.4 MMOL/L (ref 0.5–2)
LACTATE SERPL-SCNC: 3.5 MMOL/L (ref 0.5–2)
LACTATE SERPL-SCNC: 4.3 MMOL/L (ref 0.5–2)
LACTATE SERPL-SCNC: 4.4 MMOL/L (ref 0.5–2)
LDLC SERPL CALC-MCNC: 32 MG/DL (ref 0–100)
LEFT INTERNAL DIMENSION IN SYSTOLE: 2.6 CM (ref 2.1–4)
LEFT VENTRICULAR INTERNAL DIMENSION IN DIASTOLE: 3.6 CM (ref 3.69–5.49)
LEFT VENTRICULAR POSTERIOR WALL IN END DIASTOLE: 1.2 CM
LEFT VENTRICULAR STROKE VOLUME: 28 ML
LEUKOCYTE ESTERASE UR QL STRIP: ABNORMAL
LEUKOCYTE ESTERASE UR QL STRIP: NEGATIVE
LG PLATELETS BLD QL SMEAR: PRESENT
LYMPHOCYTES # BLD AUTO: 0.78 THOUSAND/UL (ref 0.6–4.47)
LYMPHOCYTES # BLD AUTO: 0.85 THOUSANDS/ΜL (ref 0.6–4.47)
LYMPHOCYTES # BLD AUTO: 0.92 THOUSANDS/ΜL (ref 0.6–4.47)
LYMPHOCYTES # BLD AUTO: 0.98 THOUSANDS/ΜL (ref 0.6–4.47)
LYMPHOCYTES # BLD AUTO: 1.03 THOUSAND/UL (ref 0.6–4.47)
LYMPHOCYTES # BLD AUTO: 1.07 THOUSAND/UL (ref 0.6–4.47)
LYMPHOCYTES # BLD AUTO: 1.07 THOUSANDS/ΜL (ref 0.6–4.47)
LYMPHOCYTES # BLD AUTO: 1.15 THOUSANDS/ΜL (ref 0.6–4.47)
LYMPHOCYTES # BLD AUTO: 1.24 THOUSAND/UL (ref 0.6–4.47)
LYMPHOCYTES # BLD AUTO: 1.4 THOUSAND/UL (ref 0.6–4.47)
LYMPHOCYTES # BLD AUTO: 1.49 THOUSAND/UL (ref 0.6–4.47)
LYMPHOCYTES # BLD AUTO: 1.69 THOUSAND/UL (ref 0.6–4.47)
LYMPHOCYTES # BLD AUTO: 1.83 THOUSAND/UL (ref 0.6–4.47)
LYMPHOCYTES # BLD AUTO: 1.87 THOUSAND/UL (ref 0.6–4.47)
LYMPHOCYTES # BLD AUTO: 1.93 THOUSAND/UL (ref 0.6–4.47)
LYMPHOCYTES # BLD AUTO: 12 % (ref 14–44)
LYMPHOCYTES # BLD AUTO: 2 % (ref 14–44)
LYMPHOCYTES # BLD AUTO: 3 % (ref 14–44)
LYMPHOCYTES # BLD AUTO: 3 % (ref 14–44)
LYMPHOCYTES # BLD AUTO: 5 % (ref 14–44)
LYMPHOCYTES # BLD AUTO: 5 % (ref 14–44)
LYMPHOCYTES # BLD AUTO: 7 % (ref 14–44)
LYMPHOCYTES # BLD AUTO: 8 % (ref 14–44)
LYMPHOCYTES # BLD AUTO: 8 % (ref 14–44)
LYMPHOCYTES # BLD AUTO: 9 % (ref 14–44)
LYMPHOCYTES # BLD AUTO: 9 % (ref 14–44)
LYMPHOCYTES NFR BLD AUTO: 2 % (ref 14–44)
LYMPHOCYTES NFR BLD AUTO: 6 % (ref 14–44)
LYMPHOCYTES NFR BLD AUTO: 6 % (ref 14–44)
LYMPHOCYTES NFR BLD AUTO: 7 % (ref 14–44)
LYMPHOCYTES NFR BLD AUTO: 8 % (ref 14–44)
MACROCYTES BLD QL AUTO: PRESENT
MAGNESIUM SERPL-MCNC: 1.7 MG/DL (ref 1.6–2.6)
MAGNESIUM SERPL-MCNC: 1.8 MG/DL (ref 1.6–2.6)
MAGNESIUM SERPL-MCNC: 1.8 MG/DL (ref 1.6–2.6)
MAGNESIUM SERPL-MCNC: 1.9 MG/DL (ref 1.6–2.6)
MAGNESIUM SERPL-MCNC: 1.9 MG/DL (ref 1.6–2.6)
MAGNESIUM SERPL-MCNC: 2 MG/DL (ref 1.6–2.6)
MAGNESIUM SERPL-MCNC: 2.1 MG/DL (ref 1.6–2.6)
MCH RBC QN AUTO: 29.8 PG (ref 26.8–34.3)
MCH RBC QN AUTO: 30.2 PG (ref 26.8–34.3)
MCH RBC QN AUTO: 30.2 PG (ref 26.8–34.3)
MCH RBC QN AUTO: 30.6 PG (ref 26.8–34.3)
MCH RBC QN AUTO: 30.7 PG (ref 26.8–34.3)
MCH RBC QN AUTO: 30.7 PG (ref 26.8–34.3)
MCH RBC QN AUTO: 30.9 PG (ref 26.8–34.3)
MCH RBC QN AUTO: 31 PG (ref 26.8–34.3)
MCH RBC QN AUTO: 31 PG (ref 26.8–34.3)
MCH RBC QN AUTO: 31.1 PG (ref 26.8–34.3)
MCH RBC QN AUTO: 31.2 PG (ref 26.8–34.3)
MCH RBC QN AUTO: 31.3 PG (ref 26.8–34.3)
MCH RBC QN AUTO: 31.4 PG (ref 26.8–34.3)
MCH RBC QN AUTO: 31.6 PG (ref 26.8–34.3)
MCH RBC QN AUTO: 31.6 PG (ref 26.8–34.3)
MCH RBC QN AUTO: 31.7 PG (ref 26.8–34.3)
MCH RBC QN AUTO: 31.9 PG (ref 26.8–34.3)
MCH RBC QN AUTO: 32 PG (ref 26.8–34.3)
MCH RBC QN AUTO: 32.5 PG (ref 26.8–34.3)
MCH RBC QN AUTO: 32.6 PG (ref 26.8–34.3)
MCHC RBC AUTO-ENTMCNC: 29.1 G/DL (ref 31.4–37.4)
MCHC RBC AUTO-ENTMCNC: 29.6 G/DL (ref 31.4–37.4)
MCHC RBC AUTO-ENTMCNC: 30.2 G/DL (ref 31.4–37.4)
MCHC RBC AUTO-ENTMCNC: 30.3 G/DL (ref 31.4–37.4)
MCHC RBC AUTO-ENTMCNC: 30.3 G/DL (ref 31.4–37.4)
MCHC RBC AUTO-ENTMCNC: 30.5 G/DL (ref 31.4–37.4)
MCHC RBC AUTO-ENTMCNC: 30.9 G/DL (ref 31.4–37.4)
MCHC RBC AUTO-ENTMCNC: 31 G/DL (ref 31.4–37.4)
MCHC RBC AUTO-ENTMCNC: 31.1 G/DL (ref 31.4–37.4)
MCHC RBC AUTO-ENTMCNC: 31.2 G/DL (ref 31.4–37.4)
MCHC RBC AUTO-ENTMCNC: 31.3 G/DL (ref 31.4–37.4)
MCHC RBC AUTO-ENTMCNC: 31.4 G/DL (ref 31.4–37.4)
MCHC RBC AUTO-ENTMCNC: 31.5 G/DL (ref 31.4–37.4)
MCHC RBC AUTO-ENTMCNC: 31.6 G/DL (ref 31.4–37.4)
MCHC RBC AUTO-ENTMCNC: 31.8 G/DL (ref 31.4–37.4)
MCHC RBC AUTO-ENTMCNC: 31.9 G/DL (ref 31.4–37.4)
MCHC RBC AUTO-ENTMCNC: 32.1 G/DL (ref 31.4–37.4)
MCHC RBC AUTO-ENTMCNC: 32.2 G/DL (ref 31.4–37.4)
MCHC RBC AUTO-ENTMCNC: 32.5 G/DL (ref 31.4–37.4)
MCHC RBC AUTO-ENTMCNC: 32.6 G/DL (ref 31.4–37.4)
MCHC RBC AUTO-ENTMCNC: 32.6 G/DL (ref 31.4–37.4)
MCHC RBC AUTO-ENTMCNC: 32.8 G/DL (ref 31.4–37.4)
MCHC RBC AUTO-ENTMCNC: 32.8 G/DL (ref 31.4–37.4)
MCV RBC AUTO: 100 FL (ref 82–98)
MCV RBC AUTO: 101 FL (ref 82–98)
MCV RBC AUTO: 102 FL (ref 82–98)
MCV RBC AUTO: 103 FL (ref 82–98)
MCV RBC AUTO: 105 FL (ref 82–98)
MCV RBC AUTO: 107 FL (ref 82–98)
MCV RBC AUTO: 96 FL (ref 82–98)
MCV RBC AUTO: 97 FL (ref 82–98)
MCV RBC AUTO: 98 FL (ref 82–98)
MCV RBC AUTO: 99 FL (ref 82–98)
METAMYELOCYTES NFR BLD MANUAL: 1 % (ref 0–1)
METAMYELOCYTES NFR BLD MANUAL: 4 % (ref 0–1)
MONOCYTES # BLD AUTO: 1.47 THOUSAND/UL (ref 0–1.22)
MONOCYTES # BLD AUTO: 1.93 THOUSAND/UL (ref 0–1.22)
MONOCYTES # BLD AUTO: 2.05 THOUSAND/UL (ref 0–1.22)
MONOCYTES # BLD AUTO: 2.12 THOUSAND/ΜL (ref 0.17–1.22)
MONOCYTES # BLD AUTO: 2.13 THOUSAND/UL (ref 0–1.22)
MONOCYTES # BLD AUTO: 2.34 THOUSAND/UL (ref 0–1.22)
MONOCYTES # BLD AUTO: 2.48 THOUSAND/UL (ref 0–1.22)
MONOCYTES # BLD AUTO: 2.61 THOUSAND/ΜL (ref 0.17–1.22)
MONOCYTES # BLD AUTO: 2.64 THOUSAND/UL (ref 0–1.22)
MONOCYTES # BLD AUTO: 2.98 THOUSAND/UL (ref 0–1.22)
MONOCYTES # BLD AUTO: 2.98 THOUSAND/ΜL (ref 0.17–1.22)
MONOCYTES # BLD AUTO: 3.36 THOUSAND/ΜL (ref 0.17–1.22)
MONOCYTES # BLD AUTO: 3.38 THOUSAND/ΜL (ref 0.17–1.22)
MONOCYTES # BLD AUTO: 4.26 THOUSAND/UL (ref 0–1.22)
MONOCYTES # BLD AUTO: 5.01 THOUSAND/UL (ref 0–1.22)
MONOCYTES NFR BLD AUTO: 14 % (ref 4–12)
MONOCYTES NFR BLD AUTO: 17 % (ref 4–12)
MONOCYTES NFR BLD AUTO: 19 % (ref 4–12)
MONOCYTES NFR BLD AUTO: 22 % (ref 4–12)
MONOCYTES NFR BLD AUTO: 9 % (ref 4–12)
MONOCYTES NFR BLD: 10 % (ref 4–12)
MONOCYTES NFR BLD: 11 % (ref 4–12)
MONOCYTES NFR BLD: 13 % (ref 4–12)
MONOCYTES NFR BLD: 14 % (ref 4–12)
MONOCYTES NFR BLD: 14 % (ref 4–12)
MONOCYTES NFR BLD: 15 % (ref 4–12)
MONOCYTES NFR BLD: 17 % (ref 4–12)
MONOCYTES NFR BLD: 7 % (ref 4–12)
MONOCYTES NFR BLD: 9 % (ref 4–12)
MRSA NOSE QL CULT: NORMAL
MV E'TISSUE VEL-LAT: 7 CM/S
MV E'TISSUE VEL-SEP: 6 CM/S
MV PEAK A VEL: 1.18 M/S
MV PEAK E VEL: 81 CM/S
MYELOCYTES NFR BLD MANUAL: 1 % (ref 0–1)
MYELOCYTES NFR BLD MANUAL: 7 % (ref 0–1)
NEUTROPHILS # BLD AUTO: 10.87 THOUSANDS/ΜL (ref 1.85–7.62)
NEUTROPHILS # BLD AUTO: 12.04 THOUSANDS/ΜL (ref 1.85–7.62)
NEUTROPHILS # BLD AUTO: 12.13 THOUSANDS/ΜL (ref 1.85–7.62)
NEUTROPHILS # BLD AUTO: 30.32 THOUSANDS/ΜL (ref 1.85–7.62)
NEUTROPHILS # BLD AUTO: 9.73 THOUSANDS/ΜL (ref 1.85–7.62)
NEUTROPHILS # BLD MANUAL: 12.1 THOUSAND/UL (ref 1.85–7.62)
NEUTROPHILS # BLD MANUAL: 15.23 THOUSAND/UL (ref 1.85–7.62)
NEUTROPHILS # BLD MANUAL: 17.18 THOUSAND/UL (ref 1.85–7.62)
NEUTROPHILS # BLD MANUAL: 17.24 THOUSAND/UL (ref 1.85–7.62)
NEUTROPHILS # BLD MANUAL: 17.64 THOUSAND/UL (ref 1.85–7.62)
NEUTROPHILS # BLD MANUAL: 17.91 THOUSAND/UL (ref 1.85–7.62)
NEUTROPHILS # BLD MANUAL: 20.36 THOUSAND/UL (ref 1.85–7.62)
NEUTROPHILS # BLD MANUAL: 29.01 THOUSAND/UL (ref 1.85–7.62)
NEUTROPHILS # BLD MANUAL: 33.71 THOUSAND/UL (ref 1.85–7.62)
NEUTROPHILS # BLD MANUAL: 9.83 THOUSAND/UL (ref 1.85–7.62)
NEUTS BAND NFR BLD MANUAL: 1 % (ref 0–8)
NEUTS BAND NFR BLD MANUAL: 1 % (ref 0–8)
NEUTS BAND NFR BLD MANUAL: 2 % (ref 0–8)
NEUTS BAND NFR BLD MANUAL: 2 % (ref 0–8)
NEUTS BAND NFR BLD MANUAL: 3 % (ref 0–8)
NEUTS BAND NFR BLD MANUAL: 4 % (ref 0–8)
NEUTS BAND NFR BLD MANUAL: 5 % (ref 0–8)
NEUTS BAND NFR BLD MANUAL: 7 % (ref 0–8)
NEUTS BAND NFR BLD MANUAL: 8 % (ref 0–8)
NEUTS SEG NFR BLD AUTO: 63 % (ref 43–75)
NEUTS SEG NFR BLD AUTO: 67 % (ref 43–75)
NEUTS SEG NFR BLD AUTO: 70 % (ref 43–75)
NEUTS SEG NFR BLD AUTO: 71 % (ref 43–75)
NEUTS SEG NFR BLD AUTO: 71 % (ref 43–75)
NEUTS SEG NFR BLD AUTO: 74 % (ref 43–75)
NEUTS SEG NFR BLD AUTO: 74 % (ref 43–75)
NEUTS SEG NFR BLD AUTO: 77 % (ref 43–75)
NEUTS SEG NFR BLD AUTO: 78 % (ref 43–75)
NEUTS SEG NFR BLD AUTO: 78 % (ref 43–75)
NEUTS SEG NFR BLD AUTO: 79 % (ref 43–75)
NEUTS SEG NFR BLD AUTO: 80 % (ref 43–75)
NEUTS SEG NFR BLD AUTO: 80 % (ref 43–75)
NEUTS SEG NFR BLD AUTO: 81 % (ref 43–75)
NEUTS SEG NFR BLD AUTO: 82 % (ref 43–75)
NEUTS SEG NFR BLD AUTO: 86 % (ref 43–75)
NITRITE UR QL STRIP: NEGATIVE
NITRITE UR QL STRIP: POSITIVE
NON-SQ EPI CELLS URNS QL MICRO: ABNORMAL /HPF
NON-SQ EPI CELLS URNS QL MICRO: ABNORMAL /HPF
NRBC BLD AUTO-RTO: 0 /100 WBCS
NT-PROBNP SERPL-MCNC: 3249 PG/ML
O2 CT BLDA-SCNC: 16.3 ML/DL (ref 16–23)
O2 CT BLDV-SCNC: 7.1 ML/DL
OXYHGB MFR BLDA: 98.2 % (ref 94–97)
P AXIS: 44 DEGREES
P AXIS: 67 DEGREES
P AXIS: 76 DEGREES
P AXIS: 78 DEGREES
P AXIS: 79 DEGREES
P AXIS: 83 DEGREES
P AXIS: 84 DEGREES
P AXIS: 88 DEGREES
P AXIS: 89 DEGREES
P AXIS: 92 DEGREES
P AXIS: 98 DEGREES
P AXIS: 99 DEGREES
PATHOLOGY REVIEW: NO
PCO2 BLDA: 27.9 MM HG (ref 36–44)
PCO2 BLDV: 42 MM HG (ref 42–50)
PCO2 TEMP ADJ BLDA: 27.2 MM HG (ref 36–44)
PH BLD: 7.48 [PH] (ref 7.35–7.45)
PH BLDA: 7.47 [PH] (ref 7.35–7.45)
PH BLDV: 7.36 [PH] (ref 7.3–7.4)
PH UR STRIP.AUTO: 5 [PH]
PH UR STRIP.AUTO: 6.5 [PH]
PHOSPHATE SERPL-MCNC: 2.1 MG/DL (ref 2.3–4.1)
PHOSPHATE SERPL-MCNC: 2.5 MG/DL (ref 2.3–4.1)
PHOSPHATE SERPL-MCNC: 2.6 MG/DL (ref 2.3–4.1)
PHOSPHATE SERPL-MCNC: 3.1 MG/DL (ref 2.3–4.1)
PHOSPHATE SERPL-MCNC: 3.2 MG/DL (ref 2.3–4.1)
PHOSPHATE SERPL-MCNC: 3.3 MG/DL (ref 2.3–4.1)
PLATELET # BLD AUTO: 129 THOUSANDS/UL (ref 149–390)
PLATELET # BLD AUTO: 143 THOUSANDS/UL (ref 149–390)
PLATELET # BLD AUTO: 145 THOUSANDS/UL (ref 149–390)
PLATELET # BLD AUTO: 153 THOUSANDS/UL (ref 149–390)
PLATELET # BLD AUTO: 154 THOUSANDS/UL (ref 149–390)
PLATELET # BLD AUTO: 160 THOUSANDS/UL (ref 149–390)
PLATELET # BLD AUTO: 173 THOUSANDS/UL (ref 149–390)
PLATELET # BLD AUTO: 181 THOUSANDS/UL (ref 149–390)
PLATELET # BLD AUTO: 186 THOUSANDS/UL (ref 149–390)
PLATELET # BLD AUTO: 205 THOUSANDS/UL (ref 149–390)
PLATELET # BLD AUTO: 206 THOUSANDS/UL (ref 149–390)
PLATELET # BLD AUTO: 206 THOUSANDS/UL (ref 149–390)
PLATELET # BLD AUTO: 207 THOUSANDS/UL (ref 149–390)
PLATELET # BLD AUTO: 208 THOUSANDS/UL (ref 149–390)
PLATELET # BLD AUTO: 209 THOUSANDS/UL (ref 149–390)
PLATELET # BLD AUTO: 212 THOUSANDS/UL (ref 149–390)
PLATELET # BLD AUTO: 223 THOUSANDS/UL (ref 149–390)
PLATELET # BLD AUTO: 225 THOUSANDS/UL (ref 149–390)
PLATELET # BLD AUTO: 233 THOUSANDS/UL (ref 149–390)
PLATELET # BLD AUTO: 244 THOUSANDS/UL (ref 149–390)
PLATELET # BLD AUTO: 245 THOUSANDS/UL (ref 149–390)
PLATELET # BLD AUTO: 256 THOUSANDS/UL (ref 149–390)
PLATELET # BLD AUTO: 257 THOUSANDS/UL (ref 149–390)
PLATELET # BLD AUTO: 274 THOUSANDS/UL (ref 149–390)
PLATELET # BLD AUTO: 367 THOUSANDS/UL (ref 149–390)
PLATELET BLD QL SMEAR: ABNORMAL
PLATELET BLD QL SMEAR: ADEQUATE
PMV BLD AUTO: 10 FL (ref 8.9–12.7)
PMV BLD AUTO: 10.1 FL (ref 8.9–12.7)
PMV BLD AUTO: 10.3 FL (ref 8.9–12.7)
PMV BLD AUTO: 10.4 FL (ref 8.9–12.7)
PMV BLD AUTO: 10.4 FL (ref 8.9–12.7)
PMV BLD AUTO: 10.5 FL (ref 8.9–12.7)
PMV BLD AUTO: 10.6 FL (ref 8.9–12.7)
PMV BLD AUTO: 10.7 FL (ref 8.9–12.7)
PMV BLD AUTO: 10.8 FL (ref 8.9–12.7)
PMV BLD AUTO: 10.8 FL (ref 8.9–12.7)
PMV BLD AUTO: 10.9 FL (ref 8.9–12.7)
PMV BLD AUTO: 11 FL (ref 8.9–12.7)
PMV BLD AUTO: 11.1 FL (ref 8.9–12.7)
PMV BLD AUTO: 11.2 FL (ref 8.9–12.7)
PMV BLD AUTO: 11.3 FL (ref 8.9–12.7)
PMV BLD AUTO: 11.7 FL (ref 8.9–12.7)
PMV BLD AUTO: 12 FL (ref 8.9–12.7)
PMV BLD AUTO: 12 FL (ref 8.9–12.7)
PMV BLD AUTO: 12.3 FL (ref 8.9–12.7)
PMV BLD AUTO: 12.3 FL (ref 8.9–12.7)
PMV BLD AUTO: 9.9 FL (ref 8.9–12.7)
PO2 BLD: 115.5 MM HG (ref 75–129)
PO2 BLDA: 119.2 MM HG (ref 75–129)
PO2 BLDV: 45 MM HG (ref 35–45)
POLYCHROMASIA BLD QL SMEAR: PRESENT
POTASSIUM SERPL-SCNC: 2.5 MMOL/L (ref 3.5–5.3)
POTASSIUM SERPL-SCNC: 3.1 MMOL/L (ref 3.5–5.3)
POTASSIUM SERPL-SCNC: 3.2 MMOL/L (ref 3.5–5.3)
POTASSIUM SERPL-SCNC: 3.3 MMOL/L (ref 3.5–5.3)
POTASSIUM SERPL-SCNC: 3.3 MMOL/L (ref 3.5–5.3)
POTASSIUM SERPL-SCNC: 3.4 MMOL/L (ref 3.5–5.3)
POTASSIUM SERPL-SCNC: 3.5 MMOL/L (ref 3.5–5.3)
POTASSIUM SERPL-SCNC: 3.8 MMOL/L (ref 3.5–5.3)
POTASSIUM SERPL-SCNC: 3.9 MMOL/L (ref 3.5–5.3)
POTASSIUM SERPL-SCNC: 4 MMOL/L (ref 3.5–5.3)
POTASSIUM SERPL-SCNC: 4.1 MMOL/L (ref 3.5–5.3)
POTASSIUM SERPL-SCNC: 4.1 MMOL/L (ref 3.5–5.3)
POTASSIUM SERPL-SCNC: 4.3 MMOL/L (ref 3.5–5.3)
POTASSIUM SERPL-SCNC: 4.4 MMOL/L (ref 3.5–5.3)
PR INTERVAL: 176 MS
PR INTERVAL: 186 MS
PR INTERVAL: 190 MS
PR INTERVAL: 192 MS
PR INTERVAL: 192 MS
PR INTERVAL: 200 MS
PR INTERVAL: 202 MS
PR INTERVAL: 204 MS
PR INTERVAL: 208 MS
PROCALCITONIN SERPL-MCNC: 0.35 NG/ML
PROCALCITONIN SERPL-MCNC: 0.37 NG/ML
PROCALCITONIN SERPL-MCNC: 0.42 NG/ML
PROCALCITONIN SERPL-MCNC: 0.43 NG/ML
PROCALCITONIN SERPL-MCNC: 0.62 NG/ML
PROCALCITONIN SERPL-MCNC: 0.88 NG/ML
PROCALCITONIN SERPL-MCNC: 1.49 NG/ML
PROCALCITONIN SERPL-MCNC: 1.49 NG/ML
PROCALCITONIN SERPL-MCNC: 9.37 NG/ML
PROCALCITONIN SERPL-MCNC: 9.74 NG/ML
PROT SERPL-MCNC: 5 G/DL (ref 6.4–8.2)
PROT SERPL-MCNC: 5.3 G/DL (ref 6.4–8.2)
PROT SERPL-MCNC: 5.8 G/DL (ref 6.4–8.2)
PROT SERPL-MCNC: 5.9 G/DL (ref 6.4–8.2)
PROT SERPL-MCNC: 6.2 G/DL (ref 6.4–8.2)
PROT UR STRIP-MCNC: NEGATIVE MG/DL
PROT UR STRIP-MCNC: NEGATIVE MG/DL
PROTHROMBIN TIME: 12.9 SECONDS (ref 11.6–14.5)
PROTHROMBIN TIME: 14.6 SECONDS (ref 11.6–14.5)
PROTHROMBIN TIME: 15.4 SECONDS (ref 11.6–14.5)
PROTHROMBIN TIME: 15.9 SECONDS (ref 11.6–14.5)
QRS AXIS: 28 DEGREES
QRS AXIS: 29 DEGREES
QRS AXIS: 30 DEGREES
QRS AXIS: 31 DEGREES
QRS AXIS: 35 DEGREES
QRS AXIS: 36 DEGREES
QRS AXIS: 37 DEGREES
QRS AXIS: 38 DEGREES
QRS AXIS: 39 DEGREES
QRS AXIS: 40 DEGREES
QRS AXIS: 41 DEGREES
QRS AXIS: 41 DEGREES
QRS AXIS: 42 DEGREES
QRS AXIS: 43 DEGREES
QRS AXIS: 46 DEGREES
QRS AXIS: 47 DEGREES
QRSD INTERVAL: 66 MS
QRSD INTERVAL: 68 MS
QRSD INTERVAL: 70 MS
QRSD INTERVAL: 70 MS
QRSD INTERVAL: 72 MS
QRSD INTERVAL: 74 MS
QRSD INTERVAL: 78 MS
QRSD INTERVAL: 78 MS
QRSD INTERVAL: 80 MS
QRSD INTERVAL: 88 MS
QRSD INTERVAL: 92 MS
QT INTERVAL: 300 MS
QT INTERVAL: 306 MS
QT INTERVAL: 312 MS
QT INTERVAL: 320 MS
QT INTERVAL: 322 MS
QT INTERVAL: 326 MS
QT INTERVAL: 330 MS
QT INTERVAL: 336 MS
QT INTERVAL: 350 MS
QT INTERVAL: 354 MS
QT INTERVAL: 360 MS
QT INTERVAL: 360 MS
QT INTERVAL: 366 MS
QT INTERVAL: 368 MS
QT INTERVAL: 370 MS
QT INTERVAL: 382 MS
QTC INTERVAL: 375 MS
QTC INTERVAL: 389 MS
QTC INTERVAL: 399 MS
QTC INTERVAL: 400 MS
QTC INTERVAL: 402 MS
QTC INTERVAL: 408 MS
QTC INTERVAL: 410 MS
QTC INTERVAL: 414 MS
QTC INTERVAL: 416 MS
QTC INTERVAL: 416 MS
QTC INTERVAL: 419 MS
QTC INTERVAL: 426 MS
QTC INTERVAL: 435 MS
QTC INTERVAL: 446 MS
QTC INTERVAL: 456 MS
QTC INTERVAL: 464 MS
RBC # BLD AUTO: 1.9 MILLION/UL (ref 3.81–5.12)
RBC # BLD AUTO: 2.25 MILLION/UL (ref 3.81–5.12)
RBC # BLD AUTO: 2.4 MILLION/UL (ref 3.81–5.12)
RBC # BLD AUTO: 2.71 MILLION/UL (ref 3.81–5.12)
RBC # BLD AUTO: 2.74 MILLION/UL (ref 3.81–5.12)
RBC # BLD AUTO: 2.76 MILLION/UL (ref 3.81–5.12)
RBC # BLD AUTO: 2.77 MILLION/UL (ref 3.81–5.12)
RBC # BLD AUTO: 2.79 MILLION/UL (ref 3.81–5.12)
RBC # BLD AUTO: 2.81 MILLION/UL (ref 3.81–5.12)
RBC # BLD AUTO: 2.81 MILLION/UL (ref 3.81–5.12)
RBC # BLD AUTO: 2.83 MILLION/UL (ref 3.81–5.12)
RBC # BLD AUTO: 2.94 MILLION/UL (ref 3.81–5.12)
RBC # BLD AUTO: 3.07 MILLION/UL (ref 3.81–5.12)
RBC # BLD AUTO: 3.09 MILLION/UL (ref 3.81–5.12)
RBC # BLD AUTO: 3.15 MILLION/UL (ref 3.81–5.12)
RBC # BLD AUTO: 3.18 MILLION/UL (ref 3.81–5.12)
RBC # BLD AUTO: 3.22 MILLION/UL (ref 3.81–5.12)
RBC # BLD AUTO: 3.22 MILLION/UL (ref 3.81–5.12)
RBC # BLD AUTO: 3.26 MILLION/UL (ref 3.81–5.12)
RBC # BLD AUTO: 3.29 MILLION/UL (ref 3.81–5.12)
RBC # BLD AUTO: 3.3 MILLION/UL (ref 3.81–5.12)
RBC # BLD AUTO: 3.35 MILLION/UL (ref 3.81–5.12)
RBC # BLD AUTO: 3.41 MILLION/UL (ref 3.81–5.12)
RBC # BLD AUTO: 3.69 MILLION/UL (ref 3.81–5.12)
RBC # BLD AUTO: 3.91 MILLION/UL (ref 3.81–5.12)
RBC #/AREA URNS AUTO: ABNORMAL /HPF
RBC #/AREA URNS AUTO: ABNORMAL /HPF
RBC MORPH BLD: NORMAL
RBC MORPH BLD: PRESENT
RH BLD: POSITIVE
RH BLD: POSITIVE
RSV RNA RESP QL NAA+PROBE: NEGATIVE
S PNEUM AG UR QL: NEGATIVE
SARS-COV-2 RNA RESP QL NAA+PROBE: NEGATIVE
SL CV PED ECHO LEFT VENTRICLE DIASTOLIC VOLUME (MOD BIPLANE) 2D: 53 ML
SL CV PED ECHO LEFT VENTRICLE SYSTOLIC VOLUME (MOD BIPLANE) 2D: 25 ML
SODIUM SERPL-SCNC: 135 MMOL/L (ref 136–145)
SODIUM SERPL-SCNC: 136 MMOL/L (ref 136–145)
SODIUM SERPL-SCNC: 136 MMOL/L (ref 136–145)
SODIUM SERPL-SCNC: 137 MMOL/L (ref 136–145)
SODIUM SERPL-SCNC: 137 MMOL/L (ref 136–145)
SODIUM SERPL-SCNC: 138 MMOL/L (ref 136–145)
SODIUM SERPL-SCNC: 139 MMOL/L (ref 136–145)
SODIUM SERPL-SCNC: 139 MMOL/L (ref 136–145)
SODIUM SERPL-SCNC: 140 MMOL/L (ref 136–145)
SODIUM SERPL-SCNC: 142 MMOL/L (ref 136–145)
SODIUM SERPL-SCNC: 142 MMOL/L (ref 136–145)
SODIUM SERPL-SCNC: 143 MMOL/L (ref 136–145)
SODIUM SERPL-SCNC: 144 MMOL/L (ref 136–145)
SODIUM SERPL-SCNC: 145 MMOL/L (ref 136–145)
SODIUM SERPL-SCNC: 146 MMOL/L (ref 136–145)
SODIUM SERPL-SCNC: 147 MMOL/L (ref 136–145)
SODIUM SERPL-SCNC: 148 MMOL/L (ref 136–145)
SODIUM SERPL-SCNC: 148 MMOL/L (ref 136–145)
SODIUM SERPL-SCNC: 151 MMOL/L (ref 136–145)
SP GR UR STRIP.AUTO: 1.02 (ref 1–1.03)
SP GR UR STRIP.AUTO: 1.02 (ref 1–1.03)
SPECIMEN EXPIRATION DATE: NORMAL
SPECIMEN EXPIRATION DATE: NORMAL
SPECIMEN SOURCE: ABNORMAL
T WAVE AXIS: -10 DEGREES
T WAVE AXIS: -18 DEGREES
T WAVE AXIS: -62 DEGREES
T WAVE AXIS: 108 DEGREES
T WAVE AXIS: 29 DEGREES
T WAVE AXIS: 30 DEGREES
T WAVE AXIS: 30 DEGREES
T WAVE AXIS: 32 DEGREES
T WAVE AXIS: 44 DEGREES
T WAVE AXIS: 46 DEGREES
T WAVE AXIS: 46 DEGREES
T WAVE AXIS: 64 DEGREES
T WAVE AXIS: 64 DEGREES
T WAVE AXIS: 66 DEGREES
T WAVE AXIS: 66 DEGREES
T WAVE AXIS: 73 DEGREES
TIBC SERPL-MCNC: 144 UG/DL (ref 250–450)
TOTAL CELLS COUNTED SPEC: 100
TRICUSPID VALVE PEAK REGURGITATION VELOCITY: 3.45 M/S
TRICUSPID VALVE S': 1.1 CM/S
TRIGL SERPL-MCNC: 118 MG/DL
TV PEAK GRADIENT: 47 MMHG
UNIT DISPENSE STATUS: NORMAL
UNIT PRODUCT CODE: NORMAL
UNIT PRODUCT VOLUME: 350 ML
UNIT RH: NORMAL
UROBILINOGEN UR QL STRIP.AUTO: 0.2 E.U./DL
UROBILINOGEN UR QL STRIP.AUTO: 0.2 E.U./DL
VANCOMYCIN SERPL-MCNC: 2.3 UG/ML
VARIANT LYMPHS # BLD AUTO: 1 %
VENTRICULAR RATE: 107 BPM
VENTRICULAR RATE: 110 BPM
VENTRICULAR RATE: 111 BPM
VENTRICULAR RATE: 113 BPM
VENTRICULAR RATE: 122 BPM
VENTRICULAR RATE: 122 BPM
VENTRICULAR RATE: 72 BPM
VENTRICULAR RATE: 73 BPM
VENTRICULAR RATE: 74 BPM
VENTRICULAR RATE: 75 BPM
VENTRICULAR RATE: 75 BPM
VENTRICULAR RATE: 76 BPM
VENTRICULAR RATE: 80 BPM
VENTRICULAR RATE: 82 BPM
VENTRICULAR RATE: 85 BPM
VENTRICULAR RATE: 93 BPM
VIT B12 SERPL-MCNC: 426 PG/ML (ref 100–900)
WBC # BLD AUTO: 13.79 THOUSAND/UL (ref 4.31–10.16)
WBC # BLD AUTO: 14.38 THOUSAND/UL (ref 4.31–10.16)
WBC # BLD AUTO: 14.69 THOUSAND/UL (ref 4.31–10.16)
WBC # BLD AUTO: 14.95 THOUSAND/UL (ref 4.31–10.16)
WBC # BLD AUTO: 15.47 THOUSAND/UL (ref 4.31–10.16)
WBC # BLD AUTO: 15.49 THOUSAND/UL (ref 4.31–10.16)
WBC # BLD AUTO: 15.6 THOUSAND/UL (ref 4.31–10.16)
WBC # BLD AUTO: 17 THOUSAND/UL (ref 4.31–10.16)
WBC # BLD AUTO: 17.49 THOUSAND/UL (ref 4.31–10.16)
WBC # BLD AUTO: 17.54 THOUSAND/UL (ref 4.31–10.16)
WBC # BLD AUTO: 18.31 THOUSAND/UL (ref 4.31–10.16)
WBC # BLD AUTO: 18.86 THOUSAND/UL (ref 4.31–10.16)
WBC # BLD AUTO: 18.92 THOUSAND/UL (ref 4.31–10.16)
WBC # BLD AUTO: 20.3 THOUSAND/UL (ref 4.31–10.16)
WBC # BLD AUTO: 20.52 THOUSAND/UL (ref 4.31–10.16)
WBC # BLD AUTO: 21.07 THOUSAND/UL (ref 4.31–10.16)
WBC # BLD AUTO: 21.25 THOUSAND/UL (ref 4.31–10.16)
WBC # BLD AUTO: 21.47 THOUSAND/UL (ref 4.31–10.16)
WBC # BLD AUTO: 23.49 THOUSAND/UL (ref 4.31–10.16)
WBC # BLD AUTO: 23.89 THOUSAND/UL (ref 4.31–10.16)
WBC # BLD AUTO: 24.83 THOUSAND/UL (ref 4.31–10.16)
WBC # BLD AUTO: 25.78 THOUSAND/UL (ref 4.31–10.16)
WBC # BLD AUTO: 35.69 THOUSAND/UL (ref 4.31–10.16)
WBC # BLD AUTO: 35.82 THOUSAND/UL (ref 4.31–10.16)
WBC # BLD AUTO: 38.75 THOUSAND/UL (ref 4.31–10.16)
WBC #/AREA URNS AUTO: ABNORMAL /HPF
WBC #/AREA URNS AUTO: ABNORMAL /HPF
Z-SCORE OF LEFT VENTRICULAR DIMENSION IN END SYSTOLE: -2.23

## 2021-01-01 PROCEDURE — 99239 HOSP IP/OBS DSCHRG MGMT >30: CPT | Performed by: INTERNAL MEDICINE

## 2021-01-01 PROCEDURE — 82805 BLOOD GASES W/O2 SATURATION: CPT | Performed by: NURSE PRACTITIONER

## 2021-01-01 PROCEDURE — 99233 SBSQ HOSP IP/OBS HIGH 50: CPT | Performed by: NURSE PRACTITIONER

## 2021-01-01 PROCEDURE — 87081 CULTURE SCREEN ONLY: CPT | Performed by: STUDENT IN AN ORGANIZED HEALTH CARE EDUCATION/TRAINING PROGRAM

## 2021-01-01 PROCEDURE — 96376 TX/PRO/DX INJ SAME DRUG ADON: CPT

## 2021-01-01 PROCEDURE — 85007 BL SMEAR W/DIFF WBC COUNT: CPT | Performed by: STUDENT IN AN ORGANIZED HEALTH CARE EDUCATION/TRAINING PROGRAM

## 2021-01-01 PROCEDURE — 83605 ASSAY OF LACTIC ACID: CPT | Performed by: EMERGENCY MEDICINE

## 2021-01-01 PROCEDURE — 94760 N-INVAS EAR/PLS OXIMETRY 1: CPT

## 2021-01-01 PROCEDURE — 94640 AIRWAY INHALATION TREATMENT: CPT

## 2021-01-01 PROCEDURE — 85027 COMPLETE CBC AUTOMATED: CPT | Performed by: EMERGENCY MEDICINE

## 2021-01-01 PROCEDURE — 99232 SBSQ HOSP IP/OBS MODERATE 35: CPT | Performed by: INTERNAL MEDICINE

## 2021-01-01 PROCEDURE — 97530 THERAPEUTIC ACTIVITIES: CPT

## 2021-01-01 PROCEDURE — 97110 THERAPEUTIC EXERCISES: CPT

## 2021-01-01 PROCEDURE — 99024 POSTOP FOLLOW-UP VISIT: CPT | Performed by: ORTHOPAEDIC SURGERY

## 2021-01-01 PROCEDURE — 84484 ASSAY OF TROPONIN QUANT: CPT | Performed by: EMERGENCY MEDICINE

## 2021-01-01 PROCEDURE — 36415 COLL VENOUS BLD VENIPUNCTURE: CPT | Performed by: EMERGENCY MEDICINE

## 2021-01-01 PROCEDURE — 99232 SBSQ HOSP IP/OBS MODERATE 35: CPT | Performed by: FAMILY MEDICINE

## 2021-01-01 PROCEDURE — 99232 SBSQ HOSP IP/OBS MODERATE 35: CPT | Performed by: STUDENT IN AN ORGANIZED HEALTH CARE EDUCATION/TRAINING PROGRAM

## 2021-01-01 PROCEDURE — 96365 THER/PROPH/DIAG IV INF INIT: CPT

## 2021-01-01 PROCEDURE — 87086 URINE CULTURE/COLONY COUNT: CPT | Performed by: EMERGENCY MEDICINE

## 2021-01-01 PROCEDURE — 99152 MOD SED SAME PHYS/QHP 5/>YRS: CPT | Performed by: EMERGENCY MEDICINE

## 2021-01-01 PROCEDURE — 86850 RBC ANTIBODY SCREEN: CPT | Performed by: PHYSICIAN ASSISTANT

## 2021-01-01 PROCEDURE — 80048 BASIC METABOLIC PNL TOTAL CA: CPT | Performed by: PHYSICIAN ASSISTANT

## 2021-01-01 PROCEDURE — 85610 PROTHROMBIN TIME: CPT | Performed by: NURSE PRACTITIONER

## 2021-01-01 PROCEDURE — 85027 COMPLETE CBC AUTOMATED: CPT | Performed by: NURSE PRACTITIONER

## 2021-01-01 PROCEDURE — 85027 COMPLETE CBC AUTOMATED: CPT | Performed by: INTERNAL MEDICINE

## 2021-01-01 PROCEDURE — 82553 CREATINE MB FRACTION: CPT | Performed by: FAMILY MEDICINE

## 2021-01-01 PROCEDURE — 83735 ASSAY OF MAGNESIUM: CPT | Performed by: PHYSICIAN ASSISTANT

## 2021-01-01 PROCEDURE — 0241U HB NFCT DS VIR RESP RNA 4 TRGT: CPT | Performed by: INTERNAL MEDICINE

## 2021-01-01 PROCEDURE — 85027 COMPLETE CBC AUTOMATED: CPT | Performed by: STUDENT IN AN ORGANIZED HEALTH CARE EDUCATION/TRAINING PROGRAM

## 2021-01-01 PROCEDURE — 87186 SC STD MICRODIL/AGAR DIL: CPT | Performed by: EMERGENCY MEDICINE

## 2021-01-01 PROCEDURE — C1776 JOINT DEVICE (IMPLANTABLE): HCPCS | Performed by: ORTHOPAEDIC SURGERY

## 2021-01-01 PROCEDURE — 85007 BL SMEAR W/DIFF WBC COUNT: CPT | Performed by: NURSE PRACTITIONER

## 2021-01-01 PROCEDURE — 74230 X-RAY XM SWLNG FUNCJ C+: CPT

## 2021-01-01 PROCEDURE — 30233N1 TRANSFUSION OF NONAUTOLOGOUS RED BLOOD CELLS INTO PERIPHERAL VEIN, PERCUTANEOUS APPROACH: ICD-10-PCS | Performed by: STUDENT IN AN ORGANIZED HEALTH CARE EDUCATION/TRAINING PROGRAM

## 2021-01-01 PROCEDURE — 71045 X-RAY EXAM CHEST 1 VIEW: CPT

## 2021-01-01 PROCEDURE — 93010 ELECTROCARDIOGRAM REPORT: CPT | Performed by: INTERNAL MEDICINE

## 2021-01-01 PROCEDURE — 99291 CRITICAL CARE FIRST HOUR: CPT | Performed by: EMERGENCY MEDICINE

## 2021-01-01 PROCEDURE — 36430 TRANSFUSION BLD/BLD COMPNT: CPT

## 2021-01-01 PROCEDURE — 81001 URINALYSIS AUTO W/SCOPE: CPT | Performed by: EMERGENCY MEDICINE

## 2021-01-01 PROCEDURE — 83735 ASSAY OF MAGNESIUM: CPT | Performed by: STUDENT IN AN ORGANIZED HEALTH CARE EDUCATION/TRAINING PROGRAM

## 2021-01-01 PROCEDURE — 80053 COMPREHEN METABOLIC PANEL: CPT | Performed by: EMERGENCY MEDICINE

## 2021-01-01 PROCEDURE — 86850 RBC ANTIBODY SCREEN: CPT | Performed by: EMERGENCY MEDICINE

## 2021-01-01 PROCEDURE — 80048 BASIC METABOLIC PNL TOTAL CA: CPT | Performed by: NURSE PRACTITIONER

## 2021-01-01 PROCEDURE — 85025 COMPLETE CBC W/AUTO DIFF WBC: CPT | Performed by: NURSE PRACTITIONER

## 2021-01-01 PROCEDURE — 82948 REAGENT STRIP/BLOOD GLUCOSE: CPT

## 2021-01-01 PROCEDURE — 93306 TTE W/DOPPLER COMPLETE: CPT | Performed by: INTERNAL MEDICINE

## 2021-01-01 PROCEDURE — 85007 BL SMEAR W/DIFF WBC COUNT: CPT | Performed by: PHYSICIAN ASSISTANT

## 2021-01-01 PROCEDURE — NC001 PR NO CHARGE: Performed by: ORTHOPAEDIC SURGERY

## 2021-01-01 PROCEDURE — 99285 EMERGENCY DEPT VISIT HI MDM: CPT

## 2021-01-01 PROCEDURE — G1004 CDSM NDSC: HCPCS

## 2021-01-01 PROCEDURE — 73501 X-RAY EXAM HIP UNI 1 VIEW: CPT

## 2021-01-01 PROCEDURE — 92610 EVALUATE SWALLOWING FUNCTION: CPT

## 2021-01-01 PROCEDURE — 99231 SBSQ HOSP IP/OBS SF/LOW 25: CPT | Performed by: INTERNAL MEDICINE

## 2021-01-01 PROCEDURE — 87040 BLOOD CULTURE FOR BACTERIA: CPT | Performed by: EMERGENCY MEDICINE

## 2021-01-01 PROCEDURE — P9016 RBC LEUKOCYTES REDUCED: HCPCS

## 2021-01-01 PROCEDURE — 70551 MRI BRAIN STEM W/O DYE: CPT

## 2021-01-01 PROCEDURE — 84100 ASSAY OF PHOSPHORUS: CPT | Performed by: NURSE PRACTITIONER

## 2021-01-01 PROCEDURE — 86901 BLOOD TYPING SEROLOGIC RH(D): CPT | Performed by: PHYSICIAN ASSISTANT

## 2021-01-01 PROCEDURE — 94668 MNPJ CHEST WALL SBSQ: CPT

## 2021-01-01 PROCEDURE — 86900 BLOOD TYPING SEROLOGIC ABO: CPT | Performed by: PHYSICIAN ASSISTANT

## 2021-01-01 PROCEDURE — 99285 EMERGENCY DEPT VISIT HI MDM: CPT | Performed by: EMERGENCY MEDICINE

## 2021-01-01 PROCEDURE — 97167 OT EVAL HIGH COMPLEX 60 MIN: CPT

## 2021-01-01 PROCEDURE — 99233 SBSQ HOSP IP/OBS HIGH 50: CPT | Performed by: INTERNAL MEDICINE

## 2021-01-01 PROCEDURE — 85025 COMPLETE CBC W/AUTO DIFF WBC: CPT | Performed by: STUDENT IN AN ORGANIZED HEALTH CARE EDUCATION/TRAINING PROGRAM

## 2021-01-01 PROCEDURE — 84145 PROCALCITONIN (PCT): CPT | Performed by: EMERGENCY MEDICINE

## 2021-01-01 PROCEDURE — 93306 TTE W/DOPPLER COMPLETE: CPT

## 2021-01-01 PROCEDURE — 84145 PROCALCITONIN (PCT): CPT | Performed by: STUDENT IN AN ORGANIZED HEALTH CARE EDUCATION/TRAINING PROGRAM

## 2021-01-01 PROCEDURE — 83735 ASSAY OF MAGNESIUM: CPT | Performed by: NURSE PRACTITIONER

## 2021-01-01 PROCEDURE — 87205 SMEAR GRAM STAIN: CPT | Performed by: PODIATRIST

## 2021-01-01 PROCEDURE — 87449 NOS EACH ORGANISM AG IA: CPT | Performed by: STUDENT IN AN ORGANIZED HEALTH CARE EDUCATION/TRAINING PROGRAM

## 2021-01-01 PROCEDURE — 85610 PROTHROMBIN TIME: CPT | Performed by: EMERGENCY MEDICINE

## 2021-01-01 PROCEDURE — 92526 ORAL FUNCTION THERAPY: CPT

## 2021-01-01 PROCEDURE — 85018 HEMOGLOBIN: CPT | Performed by: NURSE PRACTITIONER

## 2021-01-01 PROCEDURE — 99024 POSTOP FOLLOW-UP VISIT: CPT | Performed by: PHYSICIAN ASSISTANT

## 2021-01-01 PROCEDURE — 85730 THROMBOPLASTIN TIME PARTIAL: CPT | Performed by: EMERGENCY MEDICINE

## 2021-01-01 PROCEDURE — 83605 ASSAY OF LACTIC ACID: CPT | Performed by: NURSE PRACTITIONER

## 2021-01-01 PROCEDURE — 83036 HEMOGLOBIN GLYCOSYLATED A1C: CPT | Performed by: NURSE PRACTITIONER

## 2021-01-01 PROCEDURE — 99222 1ST HOSP IP/OBS MODERATE 55: CPT | Performed by: PODIATRIST

## 2021-01-01 PROCEDURE — 0HDRXZZ EXTRACTION OF TOE NAIL, EXTERNAL APPROACH: ICD-10-PCS | Performed by: PODIATRIST

## 2021-01-01 PROCEDURE — 82746 ASSAY OF FOLIC ACID SERUM: CPT | Performed by: NURSE PRACTITIONER

## 2021-01-01 PROCEDURE — 96375 TX/PRO/DX INJ NEW DRUG ADDON: CPT

## 2021-01-01 PROCEDURE — 83735 ASSAY OF MAGNESIUM: CPT | Performed by: EMERGENCY MEDICINE

## 2021-01-01 PROCEDURE — 0SSBXZZ REPOSITION LEFT HIP JOINT, EXTERNAL APPROACH: ICD-10-PCS | Performed by: ORTHOPAEDIC SURGERY

## 2021-01-01 PROCEDURE — C9113 INJ PANTOPRAZOLE SODIUM, VIA: HCPCS | Performed by: EMERGENCY MEDICINE

## 2021-01-01 PROCEDURE — 99223 1ST HOSP IP/OBS HIGH 75: CPT | Performed by: FAMILY MEDICINE

## 2021-01-01 PROCEDURE — 97163 PT EVAL HIGH COMPLEX 45 MIN: CPT

## 2021-01-01 PROCEDURE — 83880 ASSAY OF NATRIURETIC PEPTIDE: CPT | Performed by: EMERGENCY MEDICINE

## 2021-01-01 PROCEDURE — 85007 BL SMEAR W/DIFF WBC COUNT: CPT | Performed by: EMERGENCY MEDICINE

## 2021-01-01 PROCEDURE — 85730 THROMBOPLASTIN TIME PARTIAL: CPT | Performed by: NURSE PRACTITIONER

## 2021-01-01 PROCEDURE — 82306 VITAMIN D 25 HYDROXY: CPT | Performed by: NURSE PRACTITIONER

## 2021-01-01 PROCEDURE — 86920 COMPATIBILITY TEST SPIN: CPT

## 2021-01-01 PROCEDURE — 70450 CT HEAD/BRAIN W/O DYE: CPT

## 2021-01-01 PROCEDURE — NC001 PR NO CHARGE: Performed by: NURSE PRACTITIONER

## 2021-01-01 PROCEDURE — 86900 BLOOD TYPING SEROLOGIC ABO: CPT | Performed by: EMERGENCY MEDICINE

## 2021-01-01 PROCEDURE — 80048 BASIC METABOLIC PNL TOTAL CA: CPT | Performed by: STUDENT IN AN ORGANIZED HEALTH CARE EDUCATION/TRAINING PROGRAM

## 2021-01-01 PROCEDURE — 85027 COMPLETE CBC AUTOMATED: CPT | Performed by: PHYSICIAN ASSISTANT

## 2021-01-01 PROCEDURE — 30233N1 TRANSFUSION OF NONAUTOLOGOUS RED BLOOD CELLS INTO PERIPHERAL VEIN, PERCUTANEOUS APPROACH: ICD-10-PCS | Performed by: FAMILY MEDICINE

## 2021-01-01 PROCEDURE — 80048 BASIC METABOLIC PNL TOTAL CA: CPT | Performed by: FAMILY MEDICINE

## 2021-01-01 PROCEDURE — 93005 ELECTROCARDIOGRAM TRACING: CPT

## 2021-01-01 PROCEDURE — 82805 BLOOD GASES W/O2 SATURATION: CPT | Performed by: EMERGENCY MEDICINE

## 2021-01-01 PROCEDURE — 99223 1ST HOSP IP/OBS HIGH 75: CPT | Performed by: INTERNAL MEDICINE

## 2021-01-01 PROCEDURE — 80053 COMPREHEN METABOLIC PANEL: CPT | Performed by: NURSE PRACTITIONER

## 2021-01-01 PROCEDURE — 80048 BASIC METABOLIC PNL TOTAL CA: CPT | Performed by: INTERNAL MEDICINE

## 2021-01-01 PROCEDURE — 87070 CULTURE OTHR SPECIMN AEROBIC: CPT | Performed by: PODIATRIST

## 2021-01-01 PROCEDURE — 85014 HEMATOCRIT: CPT | Performed by: NURSE PRACTITIONER

## 2021-01-01 PROCEDURE — 94664 DEMO&/EVAL PT USE INHALER: CPT

## 2021-01-01 PROCEDURE — 70496 CT ANGIOGRAPHY HEAD: CPT

## 2021-01-01 PROCEDURE — 0SRS0JA REPLACEMENT OF LEFT HIP JOINT, FEMORAL SURFACE WITH SYNTHETIC SUBSTITUTE, UNCEMENTED, OPEN APPROACH: ICD-10-PCS | Performed by: ORTHOPAEDIC SURGERY

## 2021-01-01 PROCEDURE — 84484 ASSAY OF TROPONIN QUANT: CPT | Performed by: NURSE PRACTITIONER

## 2021-01-01 PROCEDURE — 84145 PROCALCITONIN (PCT): CPT | Performed by: FAMILY MEDICINE

## 2021-01-01 PROCEDURE — 97116 GAIT TRAINING THERAPY: CPT

## 2021-01-01 PROCEDURE — 99239 HOSP IP/OBS DSCHRG MGMT >30: CPT | Performed by: STUDENT IN AN ORGANIZED HEALTH CARE EDUCATION/TRAINING PROGRAM

## 2021-01-01 PROCEDURE — 96374 THER/PROPH/DIAG INJ IV PUSH: CPT

## 2021-01-01 PROCEDURE — 85027 COMPLETE CBC AUTOMATED: CPT | Performed by: FAMILY MEDICINE

## 2021-01-01 PROCEDURE — 82607 VITAMIN B-12: CPT | Performed by: NURSE PRACTITIONER

## 2021-01-01 PROCEDURE — 83735 ASSAY OF MAGNESIUM: CPT | Performed by: INTERNAL MEDICINE

## 2021-01-01 PROCEDURE — 80202 ASSAY OF VANCOMYCIN: CPT | Performed by: NURSE PRACTITIONER

## 2021-01-01 PROCEDURE — 84100 ASSAY OF PHOSPHORUS: CPT | Performed by: INTERNAL MEDICINE

## 2021-01-01 PROCEDURE — 84145 PROCALCITONIN (PCT): CPT | Performed by: NURSE PRACTITIONER

## 2021-01-01 PROCEDURE — 86901 BLOOD TYPING SEROLOGIC RH(D): CPT | Performed by: EMERGENCY MEDICINE

## 2021-01-01 PROCEDURE — 82550 ASSAY OF CK (CPK): CPT | Performed by: FAMILY MEDICINE

## 2021-01-01 PROCEDURE — 97535 SELF CARE MNGMENT TRAINING: CPT

## 2021-01-01 PROCEDURE — 99219 PR INITIAL OBSERVATION CARE/DAY 50 MINUTES: CPT | Performed by: INTERNAL MEDICINE

## 2021-01-01 PROCEDURE — 96367 TX/PROPH/DG ADDL SEQ IV INF: CPT

## 2021-01-01 PROCEDURE — 27236 TREAT THIGH FRACTURE: CPT | Performed by: ORTHOPAEDIC SURGERY

## 2021-01-01 PROCEDURE — 99233 SBSQ HOSP IP/OBS HIGH 50: CPT | Performed by: FAMILY MEDICINE

## 2021-01-01 PROCEDURE — 83550 IRON BINDING TEST: CPT | Performed by: NURSE PRACTITIONER

## 2021-01-01 PROCEDURE — 0241U HB NFCT DS VIR RESP RNA 4 TRGT: CPT | Performed by: STUDENT IN AN ORGANIZED HEALTH CARE EDUCATION/TRAINING PROGRAM

## 2021-01-01 PROCEDURE — 73502 X-RAY EXAM HIP UNI 2-3 VIEWS: CPT

## 2021-01-01 PROCEDURE — 99223 1ST HOSP IP/OBS HIGH 75: CPT | Performed by: STUDENT IN AN ORGANIZED HEALTH CARE EDUCATION/TRAINING PROGRAM

## 2021-01-01 PROCEDURE — 0241U HB NFCT DS VIR RESP RNA 4 TRGT: CPT | Performed by: EMERGENCY MEDICINE

## 2021-01-01 PROCEDURE — 27236 TREAT THIGH FRACTURE: CPT | Performed by: PHYSICIAN ASSISTANT

## 2021-01-01 PROCEDURE — 86901 BLOOD TYPING SEROLOGIC RH(D): CPT | Performed by: NURSE PRACTITIONER

## 2021-01-01 PROCEDURE — 87081 CULTURE SCREEN ONLY: CPT | Performed by: NURSE PRACTITIONER

## 2021-01-01 PROCEDURE — 82728 ASSAY OF FERRITIN: CPT | Performed by: NURSE PRACTITIONER

## 2021-01-01 PROCEDURE — 99291 CRITICAL CARE FIRST HOUR: CPT | Performed by: NURSE PRACTITIONER

## 2021-01-01 PROCEDURE — 84145 PROCALCITONIN (PCT): CPT | Performed by: PHYSICIAN ASSISTANT

## 2021-01-01 PROCEDURE — 80053 COMPREHEN METABOLIC PANEL: CPT | Performed by: PHYSICIAN ASSISTANT

## 2021-01-01 PROCEDURE — 99222 1ST HOSP IP/OBS MODERATE 55: CPT | Performed by: PHYSICIAN ASSISTANT

## 2021-01-01 PROCEDURE — 86923 COMPATIBILITY TEST ELECTRIC: CPT

## 2021-01-01 PROCEDURE — 85730 THROMBOPLASTIN TIME PARTIAL: CPT | Performed by: INTERNAL MEDICINE

## 2021-01-01 PROCEDURE — 87077 CULTURE AEROBIC IDENTIFY: CPT | Performed by: EMERGENCY MEDICINE

## 2021-01-01 PROCEDURE — 70498 CT ANGIOGRAPHY NECK: CPT

## 2021-01-01 PROCEDURE — 94660 CPAP INITIATION&MGMT: CPT

## 2021-01-01 PROCEDURE — 73620 X-RAY EXAM OF FOOT: CPT

## 2021-01-01 PROCEDURE — 86900 BLOOD TYPING SEROLOGIC ABO: CPT | Performed by: NURSE PRACTITIONER

## 2021-01-01 PROCEDURE — G0508 CRIT CARE TELEHEA CONSULT 60: HCPCS | Performed by: PSYCHIATRY & NEUROLOGY

## 2021-01-01 PROCEDURE — 0241U HB NFCT DS VIR RESP RNA 4 TRGT: CPT | Performed by: ORTHOPAEDIC SURGERY

## 2021-01-01 PROCEDURE — 36600 WITHDRAWAL OF ARTERIAL BLOOD: CPT

## 2021-01-01 PROCEDURE — 80061 LIPID PANEL: CPT | Performed by: NURSE PRACTITIONER

## 2021-01-01 PROCEDURE — 99284 EMERGENCY DEPT VISIT MOD MDM: CPT

## 2021-01-01 PROCEDURE — 94002 VENT MGMT INPAT INIT DAY: CPT

## 2021-01-01 PROCEDURE — 36415 COLL VENOUS BLD VENIPUNCTURE: CPT | Performed by: NURSE PRACTITIONER

## 2021-01-01 PROCEDURE — 83735 ASSAY OF MAGNESIUM: CPT | Performed by: FAMILY MEDICINE

## 2021-01-01 PROCEDURE — 92611 MOTION FLUOROSCOPY/SWALLOW: CPT

## 2021-01-01 PROCEDURE — 99232 SBSQ HOSP IP/OBS MODERATE 35: CPT | Performed by: PHYSICIAN ASSISTANT

## 2021-01-01 PROCEDURE — 83540 ASSAY OF IRON: CPT | Performed by: NURSE PRACTITIONER

## 2021-01-01 DEVICE — SELF CENTERING BI-POLAR HEAD 28MM ID 47MM OD
Type: IMPLANTABLE DEVICE | Site: HIP | Status: FUNCTIONAL
Brand: SELF CENTERING

## 2021-01-01 DEVICE — ARTICUL/EZE FEMORAL HEAD DIAMETER 28MM +8.5 12/14 TAPER
Type: IMPLANTABLE DEVICE | Site: HIP | Status: FUNCTIONAL
Brand: ARTICUL/EZE

## 2021-01-01 DEVICE — CORAIL HIP SYSTEM CEMENTLESS FEMORAL STEM 12/14 AMT 135 DEGREES KA SIZE 11 HA COATED STANDARD COLLAR
Type: IMPLANTABLE DEVICE | Site: HIP | Status: FUNCTIONAL
Brand: CORAIL

## 2021-01-01 RX ORDER — MAGNESIUM SULFATE HEPTAHYDRATE 40 MG/ML
2 INJECTION, SOLUTION INTRAVENOUS ONCE
Status: COMPLETED | OUTPATIENT
Start: 2021-01-01 | End: 2021-01-01

## 2021-01-01 RX ORDER — CEFAZOLIN SODIUM 2 G/50ML
2000 SOLUTION INTRAVENOUS ONCE
Status: DISCONTINUED | OUTPATIENT
Start: 2021-01-01 | End: 2021-01-01 | Stop reason: HOSPADM

## 2021-01-01 RX ORDER — ETOMIDATE 2 MG/ML
10 INJECTION INTRAVENOUS ONCE
Status: COMPLETED | OUTPATIENT
Start: 2021-01-01 | End: 2021-01-01

## 2021-01-01 RX ORDER — SODIUM CHLORIDE, SODIUM GLUCONATE, SODIUM ACETATE, POTASSIUM CHLORIDE, MAGNESIUM CHLORIDE, SODIUM PHOSPHATE, DIBASIC, AND POTASSIUM PHOSPHATE .53; .5; .37; .037; .03; .012; .00082 G/100ML; G/100ML; G/100ML; G/100ML; G/100ML; G/100ML; G/100ML
60 INJECTION, SOLUTION INTRAVENOUS CONTINUOUS
Status: DISCONTINUED | OUTPATIENT
Start: 2021-01-01 | End: 2021-01-01

## 2021-01-01 RX ORDER — TRAMADOL HYDROCHLORIDE 50 MG/1
25 TABLET ORAL EVERY 12 HOURS PRN
Qty: 6 TABLET | Refills: 0 | Status: SHIPPED | OUTPATIENT
Start: 2021-01-01 | End: 2021-01-01 | Stop reason: HOSPADM

## 2021-01-01 RX ORDER — POTASSIUM CHLORIDE 14.9 MG/ML
20 INJECTION INTRAVENOUS
Status: COMPLETED | OUTPATIENT
Start: 2021-01-01 | End: 2021-01-01

## 2021-01-01 RX ORDER — LOSARTAN POTASSIUM AND HYDROCHLOROTHIAZIDE 25; 100 MG/1; MG/1
1 TABLET ORAL EVERY MORNING
Qty: 90 TABLET | Refills: 3 | Status: SHIPPED | OUTPATIENT
Start: 2021-01-01 | End: 2021-01-01 | Stop reason: HOSPADM

## 2021-01-01 RX ORDER — TRAMADOL HYDROCHLORIDE 50 MG/1
25 TABLET ORAL EVERY 12 HOURS PRN
Status: DISCONTINUED | OUTPATIENT
Start: 2021-01-01 | End: 2021-01-01

## 2021-01-01 RX ORDER — ACETAMINOPHEN 650 MG/1
650 SUPPOSITORY RECTAL EVERY 6 HOURS PRN
Status: DISCONTINUED | OUTPATIENT
Start: 2021-01-01 | End: 2021-01-01 | Stop reason: HOSPADM

## 2021-01-01 RX ORDER — ONDANSETRON 2 MG/ML
4 INJECTION INTRAMUSCULAR; INTRAVENOUS EVERY 6 HOURS PRN
Status: DISCONTINUED | OUTPATIENT
Start: 2021-01-01 | End: 2021-01-01 | Stop reason: HOSPADM

## 2021-01-01 RX ORDER — SCOLOPAMINE TRANSDERMAL SYSTEM 1 MG/1
1 PATCH, EXTENDED RELEASE TRANSDERMAL
Status: DISCONTINUED | OUTPATIENT
Start: 2021-01-01 | End: 2021-01-01 | Stop reason: HOSPADM

## 2021-01-01 RX ORDER — SODIUM CHLORIDE 9 MG/ML
50 INJECTION, SOLUTION INTRAVENOUS CONTINUOUS
Status: DISCONTINUED | OUTPATIENT
Start: 2021-01-01 | End: 2021-01-01

## 2021-01-01 RX ORDER — ATORVASTATIN CALCIUM 80 MG/1
80 TABLET, FILM COATED ORAL
Status: DISCONTINUED | OUTPATIENT
Start: 2021-01-01 | End: 2021-01-01 | Stop reason: HOSPADM

## 2021-01-01 RX ORDER — TRAMADOL HYDROCHLORIDE 50 MG/1
25 TABLET ORAL EVERY 6 HOURS PRN
Status: DISCONTINUED | OUTPATIENT
Start: 2021-01-01 | End: 2021-01-01

## 2021-01-01 RX ORDER — METRONIDAZOLE 500 MG/1
500 TABLET ORAL EVERY 8 HOURS SCHEDULED
Status: CANCELLED | OUTPATIENT
Start: 2021-01-01

## 2021-01-01 RX ORDER — LIDOCAINE HYDROCHLORIDE 10 MG/ML
INJECTION, SOLUTION EPIDURAL; INFILTRATION; INTRACAUDAL; PERINEURAL AS NEEDED
Status: DISCONTINUED | OUTPATIENT
Start: 2021-01-01 | End: 2021-01-01

## 2021-01-01 RX ORDER — PANTOPRAZOLE SODIUM 40 MG/1
40 TABLET, DELAYED RELEASE ORAL
Refills: 0
Start: 2021-01-01 | End: 2021-01-01 | Stop reason: HOSPADM

## 2021-01-01 RX ORDER — FERROUS SULFATE 325(65) MG
325 TABLET ORAL EVERY OTHER DAY
Status: DISCONTINUED | OUTPATIENT
Start: 2021-01-01 | End: 2021-01-01

## 2021-01-01 RX ORDER — BISACODYL 10 MG
10 SUPPOSITORY, RECTAL RECTAL DAILY PRN
Status: DISCONTINUED | OUTPATIENT
Start: 2021-01-01 | End: 2021-01-01 | Stop reason: HOSPADM

## 2021-01-01 RX ORDER — PANTOPRAZOLE SODIUM 40 MG/1
40 INJECTION, POWDER, FOR SOLUTION INTRAVENOUS ONCE
Status: COMPLETED | OUTPATIENT
Start: 2021-01-01 | End: 2021-01-01

## 2021-01-01 RX ORDER — DOCUSATE SODIUM 100 MG/1
100 CAPSULE, LIQUID FILLED ORAL 2 TIMES DAILY
Status: DISCONTINUED | OUTPATIENT
Start: 2021-01-01 | End: 2021-01-01 | Stop reason: HOSPADM

## 2021-01-01 RX ORDER — BUPIVACAINE HYDROCHLORIDE 2.5 MG/ML
INJECTION, SOLUTION EPIDURAL; INFILTRATION; INTRACAUDAL AS NEEDED
Status: DISCONTINUED | OUTPATIENT
Start: 2021-01-01 | End: 2021-01-01 | Stop reason: HOSPADM

## 2021-01-01 RX ORDER — LOSARTAN POTASSIUM 50 MG/1
100 TABLET ORAL ONCE
Status: COMPLETED | OUTPATIENT
Start: 2021-01-01 | End: 2021-01-01

## 2021-01-01 RX ORDER — FERROUS SULFATE 325(65) MG
325 TABLET ORAL EVERY OTHER DAY
Refills: 0
Start: 2021-01-01 | End: 2021-01-01 | Stop reason: HOSPADM

## 2021-01-01 RX ORDER — BISACODYL 10 MG
10 SUPPOSITORY, RECTAL RECTAL DAILY PRN
Status: CANCELLED | OUTPATIENT
Start: 2021-01-01

## 2021-01-01 RX ORDER — DEXTROSE MONOHYDRATE 50 MG/ML
50 INJECTION, SOLUTION INTRAVENOUS CONTINUOUS
Status: CANCELLED | OUTPATIENT
Start: 2021-01-01

## 2021-01-01 RX ORDER — GLYCOPYRROLATE 0.2 MG/ML
0.1 INJECTION INTRAMUSCULAR; INTRAVENOUS EVERY 4 HOURS PRN
Status: DISCONTINUED | OUTPATIENT
Start: 2021-01-01 | End: 2021-01-01 | Stop reason: HOSPADM

## 2021-01-01 RX ORDER — PANTOPRAZOLE SODIUM 40 MG/1
40 TABLET, DELAYED RELEASE ORAL
Status: DISCONTINUED | OUTPATIENT
Start: 2021-01-01 | End: 2021-01-01 | Stop reason: HOSPADM

## 2021-01-01 RX ORDER — POTASSIUM CHLORIDE 14.9 MG/ML
20 INJECTION INTRAVENOUS ONCE
Status: COMPLETED | OUTPATIENT
Start: 2021-01-01 | End: 2021-01-01

## 2021-01-01 RX ORDER — SUCCINYLCHOLINE/SOD CL,ISO/PF 100 MG/5ML
SYRINGE (ML) INTRAVENOUS AS NEEDED
Status: DISCONTINUED | OUTPATIENT
Start: 2021-01-01 | End: 2021-01-01

## 2021-01-01 RX ORDER — LEVALBUTEROL 1.25 MG/.5ML
1.25 SOLUTION, CONCENTRATE RESPIRATORY (INHALATION)
Status: DISCONTINUED | OUTPATIENT
Start: 2021-01-01 | End: 2021-01-01

## 2021-01-01 RX ORDER — SODIUM CHLORIDE FOR INHALATION 0.9 %
3 VIAL, NEBULIZER (ML) INHALATION
Status: DISCONTINUED | OUTPATIENT
Start: 2021-01-01 | End: 2021-01-01 | Stop reason: HOSPADM

## 2021-01-01 RX ORDER — POLYETHYLENE GLYCOL 3350 17 G/17G
17 POWDER, FOR SOLUTION ORAL DAILY PRN
Status: DISCONTINUED | OUTPATIENT
Start: 2021-01-01 | End: 2021-01-01 | Stop reason: HOSPADM

## 2021-01-01 RX ORDER — SODIUM CHLORIDE 9 MG/ML
INJECTION, SOLUTION INTRAVENOUS CONTINUOUS PRN
Status: DISCONTINUED | OUTPATIENT
Start: 2021-01-01 | End: 2021-01-01

## 2021-01-01 RX ORDER — FERROUS SULFATE 325(65) MG
325 TABLET ORAL EVERY OTHER DAY
Status: DISCONTINUED | OUTPATIENT
Start: 2021-01-01 | End: 2021-01-01 | Stop reason: HOSPADM

## 2021-01-01 RX ORDER — DEXTROSE MONOHYDRATE 50 MG/ML
50 INJECTION, SOLUTION INTRAVENOUS CONTINUOUS
Status: DISCONTINUED | OUTPATIENT
Start: 2021-01-01 | End: 2021-01-01

## 2021-01-01 RX ORDER — DEXTROSE AND SODIUM CHLORIDE 5; .45 G/100ML; G/100ML
50 INJECTION, SOLUTION INTRAVENOUS CONTINUOUS
Status: DISCONTINUED | OUTPATIENT
Start: 2021-01-01 | End: 2021-01-01

## 2021-01-01 RX ORDER — SODIUM CHLORIDE 9 MG/ML
50 INJECTION, SOLUTION INTRAVENOUS CONTINUOUS
Status: CANCELLED | OUTPATIENT
Start: 2021-01-01

## 2021-01-01 RX ORDER — POTASSIUM CHLORIDE 20MEQ/15ML
40 LIQUID (ML) ORAL ONCE
Status: COMPLETED | OUTPATIENT
Start: 2021-01-01 | End: 2021-01-01

## 2021-01-01 RX ORDER — CEFAZOLIN SODIUM 2 G/50ML
2000 SOLUTION INTRAVENOUS EVERY 8 HOURS
Status: COMPLETED | OUTPATIENT
Start: 2021-01-01 | End: 2021-01-01

## 2021-01-01 RX ORDER — CLOPIDOGREL BISULFATE 75 MG/1
75 TABLET ORAL DAILY
Refills: 0
Start: 2021-01-01 | End: 2021-01-01 | Stop reason: HOSPADM

## 2021-01-01 RX ORDER — SODIUM CHLORIDE FOR INHALATION 0.9 %
3 VIAL, NEBULIZER (ML) INHALATION
Status: DISCONTINUED | OUTPATIENT
Start: 2021-01-01 | End: 2021-01-01

## 2021-01-01 RX ORDER — DOCUSATE SODIUM 100 MG/1
100 CAPSULE, LIQUID FILLED ORAL 2 TIMES DAILY
Status: DISCONTINUED | OUTPATIENT
Start: 2021-01-01 | End: 2021-01-01

## 2021-01-01 RX ORDER — DOCUSATE SODIUM 100 MG/1
100 CAPSULE, LIQUID FILLED ORAL 2 TIMES DAILY
Refills: 0
Start: 2021-01-01 | End: 2021-01-01 | Stop reason: HOSPADM

## 2021-01-01 RX ORDER — TRAMADOL HYDROCHLORIDE 50 MG/1
25 TABLET ORAL EVERY 12 HOURS PRN
Status: DISCONTINUED | OUTPATIENT
Start: 2021-01-01 | End: 2021-01-01 | Stop reason: HOSPADM

## 2021-01-01 RX ORDER — MULTIVIT WITH MINERALS/LUTEIN
250 TABLET ORAL DAILY
Status: DISCONTINUED | OUTPATIENT
Start: 2021-01-01 | End: 2021-01-01 | Stop reason: HOSPADM

## 2021-01-01 RX ORDER — HYDRALAZINE HYDROCHLORIDE 20 MG/ML
5 INJECTION INTRAMUSCULAR; INTRAVENOUS EVERY 6 HOURS PRN
Status: DISCONTINUED | OUTPATIENT
Start: 2021-01-01 | End: 2021-01-01

## 2021-01-01 RX ORDER — SODIUM CHLORIDE, SODIUM GLUCONATE, SODIUM ACETATE, POTASSIUM CHLORIDE, MAGNESIUM CHLORIDE, SODIUM PHOSPHATE, DIBASIC, AND POTASSIUM PHOSPHATE .53; .5; .37; .037; .03; .012; .00082 G/100ML; G/100ML; G/100ML; G/100ML; G/100ML; G/100ML; G/100ML
50 INJECTION, SOLUTION INTRAVENOUS CONTINUOUS
Status: DISCONTINUED | OUTPATIENT
Start: 2021-01-01 | End: 2021-01-01

## 2021-01-01 RX ORDER — HEPARIN SODIUM 5000 [USP'U]/ML
5000 INJECTION, SOLUTION INTRAVENOUS; SUBCUTANEOUS EVERY 8 HOURS SCHEDULED
Status: DISCONTINUED | OUTPATIENT
Start: 2021-01-01 | End: 2021-01-01 | Stop reason: HOSPADM

## 2021-01-01 RX ORDER — ASPIRIN 300 MG/1
300 SUPPOSITORY RECTAL DAILY
Status: DISCONTINUED | OUTPATIENT
Start: 2021-01-01 | End: 2021-01-01

## 2021-01-01 RX ORDER — METRONIDAZOLE 500 MG/1
500 TABLET ORAL ONCE
Status: DISCONTINUED | OUTPATIENT
Start: 2021-01-01 | End: 2021-01-01

## 2021-01-01 RX ORDER — LOSARTAN POTASSIUM 50 MG/1
50 TABLET ORAL DAILY
Status: DISCONTINUED | OUTPATIENT
Start: 2021-01-01 | End: 2021-01-01 | Stop reason: HOSPADM

## 2021-01-01 RX ORDER — HEPARIN SODIUM 5000 [USP'U]/ML
5000 INJECTION, SOLUTION INTRAVENOUS; SUBCUTANEOUS EVERY 8 HOURS SCHEDULED
Qty: 1 ML | Refills: 0
Start: 2021-01-01 | End: 2021-01-01 | Stop reason: HOSPADM

## 2021-01-01 RX ORDER — OXYCODONE HYDROCHLORIDE 5 MG/1
5 TABLET ORAL EVERY 4 HOURS PRN
Status: DISCONTINUED | OUTPATIENT
Start: 2021-01-01 | End: 2021-01-01

## 2021-01-01 RX ORDER — SODIUM CHLORIDE FOR INHALATION 0.9 %
VIAL, NEBULIZER (ML) INHALATION
Status: COMPLETED
Start: 2021-01-01 | End: 2021-01-01

## 2021-01-01 RX ORDER — FENTANYL CITRATE/PF 50 MCG/ML
25 SYRINGE (ML) INJECTION
Status: DISCONTINUED | OUTPATIENT
Start: 2021-01-01 | End: 2021-01-01 | Stop reason: HOSPADM

## 2021-01-01 RX ORDER — HEPARIN SODIUM 5000 [USP'U]/ML
5000 INJECTION, SOLUTION INTRAVENOUS; SUBCUTANEOUS EVERY 8 HOURS SCHEDULED
Status: DISCONTINUED | OUTPATIENT
Start: 2021-01-01 | End: 2021-01-01

## 2021-01-01 RX ORDER — PRAVASTATIN SODIUM 40 MG
40 TABLET ORAL
Status: DISCONTINUED | OUTPATIENT
Start: 2021-01-01 | End: 2021-01-01

## 2021-01-01 RX ORDER — SODIUM CHLORIDE FOR INHALATION 0.9 %
3 VIAL, NEBULIZER (ML) INHALATION
Status: CANCELLED | OUTPATIENT
Start: 2021-01-01

## 2021-01-01 RX ORDER — POTASSIUM CHLORIDE 20 MEQ/1
40 TABLET, EXTENDED RELEASE ORAL ONCE
Status: DISCONTINUED | OUTPATIENT
Start: 2021-01-01 | End: 2021-01-01

## 2021-01-01 RX ORDER — DEXTROSE MONOHYDRATE 50 MG/ML
50 INJECTION, SOLUTION INTRAVENOUS CONTINUOUS
Status: DISCONTINUED | OUTPATIENT
Start: 2021-01-01 | End: 2021-01-01 | Stop reason: HOSPADM

## 2021-01-01 RX ORDER — POTASSIUM CHLORIDE 20 MEQ/1
40 TABLET, EXTENDED RELEASE ORAL ONCE
Status: COMPLETED | OUTPATIENT
Start: 2021-01-01 | End: 2021-01-01

## 2021-01-01 RX ORDER — FOLIC ACID 1 MG/1
1 TABLET ORAL DAILY
Status: DISCONTINUED | OUTPATIENT
Start: 2021-01-01 | End: 2021-01-01

## 2021-01-01 RX ORDER — DEXAMETHASONE SODIUM PHOSPHATE 4 MG/ML
INJECTION, SOLUTION INTRA-ARTICULAR; INTRALESIONAL; INTRAMUSCULAR; INTRAVENOUS; SOFT TISSUE AS NEEDED
Status: DISCONTINUED | OUTPATIENT
Start: 2021-01-01 | End: 2021-01-01

## 2021-01-01 RX ORDER — ROCURONIUM BROMIDE 10 MG/ML
INJECTION, SOLUTION INTRAVENOUS AS NEEDED
Status: DISCONTINUED | OUTPATIENT
Start: 2021-01-01 | End: 2021-01-01

## 2021-01-01 RX ORDER — ACETAMINOPHEN 325 MG/1
975 TABLET ORAL 3 TIMES DAILY
Status: DISCONTINUED | OUTPATIENT
Start: 2021-01-01 | End: 2021-01-01

## 2021-01-01 RX ORDER — LABETALOL 20 MG/4 ML (5 MG/ML) INTRAVENOUS SYRINGE
5 ONCE
Status: COMPLETED | OUTPATIENT
Start: 2021-01-01 | End: 2021-01-01

## 2021-01-01 RX ORDER — PANTOPRAZOLE SODIUM 40 MG/1
40 TABLET, DELAYED RELEASE ORAL
Status: DISCONTINUED | OUTPATIENT
Start: 2021-01-01 | End: 2021-01-01

## 2021-01-01 RX ORDER — LEVALBUTEROL 1.25 MG/.5ML
1.25 SOLUTION, CONCENTRATE RESPIRATORY (INHALATION)
Status: DISCONTINUED | OUTPATIENT
Start: 2021-01-01 | End: 2021-01-01 | Stop reason: HOSPADM

## 2021-01-01 RX ORDER — ALBUMIN, HUMAN INJ 5% 5 %
SOLUTION INTRAVENOUS CONTINUOUS PRN
Status: DISCONTINUED | OUTPATIENT
Start: 2021-01-01 | End: 2021-01-01

## 2021-01-01 RX ORDER — CEFTRIAXONE 1 G/50ML
1000 INJECTION, SOLUTION INTRAVENOUS EVERY 24 HOURS
Status: DISCONTINUED | OUTPATIENT
Start: 2021-01-01 | End: 2021-01-01

## 2021-01-01 RX ORDER — DEXTROSE AND SODIUM CHLORIDE 5; .9 G/100ML; G/100ML
50 INJECTION, SOLUTION INTRAVENOUS CONTINUOUS
Status: DISCONTINUED | OUTPATIENT
Start: 2021-01-01 | End: 2021-01-01

## 2021-01-01 RX ORDER — ASPIRIN 81 MG/1
81 TABLET ORAL DAILY
Status: DISCONTINUED | OUTPATIENT
Start: 2021-01-01 | End: 2021-01-01 | Stop reason: HOSPADM

## 2021-01-01 RX ORDER — POTASSIUM CHLORIDE 14.9 MG/ML
20 INJECTION INTRAVENOUS ONCE
Status: DISCONTINUED | OUTPATIENT
Start: 2021-01-01 | End: 2021-01-01

## 2021-01-01 RX ORDER — ACETAMINOPHEN 325 MG/1
975 TABLET ORAL EVERY 8 HOURS
Status: DISCONTINUED | OUTPATIENT
Start: 2021-01-01 | End: 2021-01-01

## 2021-01-01 RX ORDER — LEVALBUTEROL INHALATION SOLUTION 0.63 MG/3ML
0.63 SOLUTION RESPIRATORY (INHALATION) EVERY 8 HOURS PRN
Status: DISCONTINUED | OUTPATIENT
Start: 2021-01-01 | End: 2021-01-01

## 2021-01-01 RX ORDER — ACETAMINOPHEN 650 MG/1
650 SUPPOSITORY RECTAL EVERY 6 HOURS PRN
Status: CANCELLED | OUTPATIENT
Start: 2021-01-01

## 2021-01-01 RX ORDER — ETOMIDATE 2 MG/ML
INJECTION INTRAVENOUS
Status: COMPLETED
Start: 2021-01-01 | End: 2021-01-01

## 2021-01-01 RX ORDER — POTASSIUM CHLORIDE 29.8 MG/ML
40 INJECTION INTRAVENOUS ONCE
Status: DISCONTINUED | OUTPATIENT
Start: 2021-01-01 | End: 2021-01-01

## 2021-01-01 RX ORDER — ACETAMINOPHEN 325 MG/1
975 TABLET ORAL EVERY 8 HOURS
Status: DISCONTINUED | OUTPATIENT
Start: 2021-01-01 | End: 2021-01-01 | Stop reason: HOSPADM

## 2021-01-01 RX ORDER — ASPIRIN 81 MG/1
81 TABLET ORAL DAILY
Status: DISCONTINUED | OUTPATIENT
Start: 2021-01-01 | End: 2021-01-01

## 2021-01-01 RX ORDER — LOSARTAN POTASSIUM 50 MG/1
100 TABLET ORAL DAILY
Status: DISCONTINUED | OUTPATIENT
Start: 2021-01-01 | End: 2021-01-01

## 2021-01-01 RX ORDER — CEFAZOLIN SODIUM 1 G/50ML
SOLUTION INTRAVENOUS AS NEEDED
Status: DISCONTINUED | OUTPATIENT
Start: 2021-01-01 | End: 2021-01-01

## 2021-01-01 RX ORDER — MAGNESIUM HYDROXIDE 1200 MG/15ML
LIQUID ORAL AS NEEDED
Status: DISCONTINUED | OUTPATIENT
Start: 2021-01-01 | End: 2021-01-01 | Stop reason: HOSPADM

## 2021-01-01 RX ORDER — SODIUM CHLORIDE, SODIUM GLUCONATE, SODIUM ACETATE, POTASSIUM CHLORIDE, MAGNESIUM CHLORIDE, SODIUM PHOSPHATE, DIBASIC, AND POTASSIUM PHOSPHATE .53; .5; .37; .037; .03; .012; .00082 G/100ML; G/100ML; G/100ML; G/100ML; G/100ML; G/100ML; G/100ML
1300 INJECTION, SOLUTION INTRAVENOUS ONCE
Status: COMPLETED | OUTPATIENT
Start: 2021-01-01 | End: 2021-01-01

## 2021-01-01 RX ORDER — SODIUM CHLORIDE, SODIUM GLUCONATE, SODIUM ACETATE, POTASSIUM CHLORIDE, MAGNESIUM CHLORIDE, SODIUM PHOSPHATE, DIBASIC, AND POTASSIUM PHOSPHATE .53; .5; .37; .037; .03; .012; .00082 G/100ML; G/100ML; G/100ML; G/100ML; G/100ML; G/100ML; G/100ML
500 INJECTION, SOLUTION INTRAVENOUS ONCE
Status: COMPLETED | OUTPATIENT
Start: 2021-01-01 | End: 2021-01-01

## 2021-01-01 RX ORDER — ONDANSETRON 2 MG/ML
4 INJECTION INTRAMUSCULAR; INTRAVENOUS ONCE AS NEEDED
Status: DISCONTINUED | OUTPATIENT
Start: 2021-01-01 | End: 2021-01-01 | Stop reason: HOSPADM

## 2021-01-01 RX ORDER — OXYCODONE HYDROCHLORIDE 5 MG/1
2.5 TABLET ORAL EVERY 4 HOURS PRN
Status: DISCONTINUED | OUTPATIENT
Start: 2021-01-01 | End: 2021-01-01

## 2021-01-01 RX ORDER — ONDANSETRON 2 MG/ML
4 INJECTION INTRAMUSCULAR; INTRAVENOUS EVERY 6 HOURS PRN
Status: DISCONTINUED | OUTPATIENT
Start: 2021-01-01 | End: 2021-01-01

## 2021-01-01 RX ORDER — ACETAMINOPHEN 325 MG/1
650 TABLET ORAL ONCE
Status: DISCONTINUED | OUTPATIENT
Start: 2021-01-01 | End: 2021-01-01

## 2021-01-01 RX ORDER — CEFEPIME HYDROCHLORIDE 2 G/50ML
2000 INJECTION, SOLUTION INTRAVENOUS ONCE
Status: COMPLETED | OUTPATIENT
Start: 2021-01-01 | End: 2021-01-01

## 2021-01-01 RX ORDER — POTASSIUM CHLORIDE 29.8 MG/ML
40 INJECTION INTRAVENOUS EVERY 4 HOURS
Status: DISCONTINUED | OUTPATIENT
Start: 2021-01-01 | End: 2021-01-01

## 2021-01-01 RX ORDER — GLYCOPYRROLATE 0.2 MG/ML
0.1 INJECTION INTRAMUSCULAR; INTRAVENOUS EVERY 4 HOURS PRN
Status: CANCELLED | OUTPATIENT
Start: 2021-01-01

## 2021-01-01 RX ORDER — ONDANSETRON 2 MG/ML
INJECTION INTRAMUSCULAR; INTRAVENOUS AS NEEDED
Status: DISCONTINUED | OUTPATIENT
Start: 2021-01-01 | End: 2021-01-01

## 2021-01-01 RX ORDER — VANCOMYCIN HYDROCHLORIDE 1 G/200ML
17.5 INJECTION, SOLUTION INTRAVENOUS ONCE
Status: COMPLETED | OUTPATIENT
Start: 2021-01-01 | End: 2021-01-01

## 2021-01-01 RX ORDER — PROPOFOL 10 MG/ML
INJECTION, EMULSION INTRAVENOUS AS NEEDED
Status: DISCONTINUED | OUTPATIENT
Start: 2021-01-01 | End: 2021-01-01

## 2021-01-01 RX ORDER — CLINDAMYCIN PHOSPHATE 600 MG/50ML
600 INJECTION INTRAVENOUS ONCE
Status: COMPLETED | OUTPATIENT
Start: 2021-01-01 | End: 2021-01-01

## 2021-01-01 RX ORDER — CALCIUM CARBONATE 200(500)MG
1000 TABLET,CHEWABLE ORAL DAILY PRN
Status: DISCONTINUED | OUTPATIENT
Start: 2021-01-01 | End: 2021-01-01 | Stop reason: HOSPADM

## 2021-01-01 RX ORDER — FOLIC ACID 1 MG/1
1 TABLET ORAL DAILY
Refills: 0
Start: 2021-01-01 | End: 2021-01-01 | Stop reason: HOSPADM

## 2021-01-01 RX ORDER — LEVALBUTEROL 1.25 MG/.5ML
1.25 SOLUTION, CONCENTRATE RESPIRATORY (INHALATION)
Status: CANCELLED | OUTPATIENT
Start: 2021-01-01

## 2021-01-01 RX ORDER — OXYCODONE HYDROCHLORIDE 5 MG/1
2.5 TABLET ORAL EVERY 6 HOURS PRN
Status: DISCONTINUED | OUTPATIENT
Start: 2021-01-01 | End: 2021-01-01

## 2021-01-01 RX ORDER — LOSARTAN POTASSIUM 100 MG/1
50 TABLET ORAL DAILY
COMMUNITY
End: 2021-01-01 | Stop reason: HOSPADM

## 2021-01-01 RX ORDER — DILTIAZEM HYDROCHLORIDE 5 MG/ML
15 INJECTION INTRAVENOUS ONCE
Status: COMPLETED | OUTPATIENT
Start: 2021-01-01 | End: 2021-01-01

## 2021-01-01 RX ORDER — HYDROCHLOROTHIAZIDE 25 MG/1
25 TABLET ORAL ONCE
Status: COMPLETED | OUTPATIENT
Start: 2021-01-01 | End: 2021-01-01

## 2021-01-01 RX ORDER — ATORVASTATIN CALCIUM 80 MG/1
80 TABLET, FILM COATED ORAL
Status: DISCONTINUED | OUTPATIENT
Start: 2021-01-01 | End: 2021-01-01

## 2021-01-01 RX ORDER — ATORVASTATIN CALCIUM 80 MG/1
80 TABLET, FILM COATED ORAL
Refills: 0
Start: 2021-01-01 | End: 2021-01-01 | Stop reason: HOSPADM

## 2021-01-01 RX ORDER — ATORVASTATIN CALCIUM 40 MG/1
40 TABLET, FILM COATED ORAL
Status: DISCONTINUED | OUTPATIENT
Start: 2021-01-01 | End: 2021-01-01 | Stop reason: HOSPADM

## 2021-01-01 RX ORDER — GUAIFENESIN 600 MG
600 TABLET, EXTENDED RELEASE 12 HR ORAL 2 TIMES DAILY
Status: DISCONTINUED | OUTPATIENT
Start: 2021-01-01 | End: 2021-01-01

## 2021-01-01 RX ORDER — POLYETHYLENE GLYCOL 3350 17 G/17G
17 POWDER, FOR SOLUTION ORAL DAILY PRN
Refills: 0
Start: 2021-01-01 | End: 2021-01-01 | Stop reason: HOSPADM

## 2021-01-01 RX ORDER — ASCORBIC ACID 250 MG
250 TABLET ORAL DAILY
Refills: 0
Start: 2021-01-01 | End: 2021-01-01 | Stop reason: HOSPADM

## 2021-01-01 RX ORDER — ACETAMINOPHEN 650 MG/1
650 SUPPOSITORY RECTAL ONCE
Status: COMPLETED | OUTPATIENT
Start: 2021-01-01 | End: 2021-01-01

## 2021-01-01 RX ORDER — FENTANYL CITRATE 50 UG/ML
INJECTION, SOLUTION INTRAMUSCULAR; INTRAVENOUS AS NEEDED
Status: DISCONTINUED | OUTPATIENT
Start: 2021-01-01 | End: 2021-01-01

## 2021-01-01 RX ORDER — FOLIC ACID 1 MG/1
1 TABLET ORAL DAILY
Status: DISCONTINUED | OUTPATIENT
Start: 2021-01-01 | End: 2021-01-01 | Stop reason: HOSPADM

## 2021-01-01 RX ORDER — PANTOPRAZOLE SODIUM 40 MG/1
40 TABLET, DELAYED RELEASE ORAL DAILY
Status: DISCONTINUED | OUTPATIENT
Start: 2021-01-01 | End: 2021-01-01

## 2021-01-01 RX ORDER — HEPARIN SODIUM 10000 [USP'U]/100ML
3-20 INJECTION, SOLUTION INTRAVENOUS
Status: DISCONTINUED | OUTPATIENT
Start: 2021-01-01 | End: 2021-01-01

## 2021-01-01 RX ORDER — ACETAMINOPHEN 325 MG/1
975 TABLET ORAL EVERY 8 HOURS
Refills: 0
Start: 2021-01-01 | End: 2021-01-01 | Stop reason: HOSPADM

## 2021-01-01 RX ADMIN — HEPARIN SODIUM 5000 UNITS: 5000 INJECTION INTRAVENOUS; SUBCUTANEOUS at 15:13

## 2021-01-01 RX ADMIN — CEFEPIME HYDROCHLORIDE 2000 MG: 2 INJECTION, SOLUTION INTRAVENOUS at 23:17

## 2021-01-01 RX ADMIN — SUGAMMADEX 200 MG: 100 INJECTION, SOLUTION INTRAVENOUS at 17:38

## 2021-01-01 RX ADMIN — FOLIC ACID 1 MG: 1 TABLET ORAL at 08:11

## 2021-01-01 RX ADMIN — HEPARIN SODIUM 5000 UNITS: 5000 INJECTION INTRAVENOUS; SUBCUTANEOUS at 05:37

## 2021-01-01 RX ADMIN — HEPARIN SODIUM 5000 UNITS: 5000 INJECTION INTRAVENOUS; SUBCUTANEOUS at 22:25

## 2021-01-01 RX ADMIN — LEVALBUTEROL HYDROCHLORIDE 1.25 MG: 1.25 SOLUTION, CONCENTRATE RESPIRATORY (INHALATION) at 07:10

## 2021-01-01 RX ADMIN — ATORVASTATIN CALCIUM 80 MG: 80 TABLET ORAL at 17:36

## 2021-01-01 RX ADMIN — B-COMPLEX W/ C & FOLIC ACID TAB 1 TABLET: TAB at 09:43

## 2021-01-01 RX ADMIN — FOLIC ACID 1 MG: 1 TABLET ORAL at 09:45

## 2021-01-01 RX ADMIN — HEPARIN SODIUM 5000 UNITS: 5000 INJECTION INTRAVENOUS; SUBCUTANEOUS at 14:32

## 2021-01-01 RX ADMIN — MORPHINE SULFATE 2 MG: 2 INJECTION, SOLUTION INTRAMUSCULAR; INTRAVENOUS at 17:17

## 2021-01-01 RX ADMIN — FOLIC ACID 1 MG: 1 TABLET ORAL at 09:43

## 2021-01-01 RX ADMIN — SODIUM CHLORIDE, SODIUM GLUCONATE, SODIUM ACETATE, POTASSIUM CHLORIDE, MAGNESIUM CHLORIDE, SODIUM PHOSPHATE, DIBASIC, AND POTASSIUM PHOSPHATE 50 ML/HR: .53; .5; .37; .037; .03; .012; .00082 INJECTION, SOLUTION INTRAVENOUS at 16:03

## 2021-01-01 RX ADMIN — ISODIUM CHLORIDE 3 ML: 0.03 SOLUTION RESPIRATORY (INHALATION) at 13:47

## 2021-01-01 RX ADMIN — PIPERACILLIN AND TAZOBACTAM 3.38 G: 36; 4.5 INJECTION, POWDER, FOR SOLUTION INTRAVENOUS at 01:06

## 2021-01-01 RX ADMIN — HEPARIN SODIUM 5000 UNITS: 5000 INJECTION INTRAVENOUS; SUBCUTANEOUS at 13:33

## 2021-01-01 RX ADMIN — ISODIUM CHLORIDE 3 ML: 0.03 SOLUTION RESPIRATORY (INHALATION) at 08:24

## 2021-01-01 RX ADMIN — ACETAMINOPHEN 975 MG: 325 TABLET, FILM COATED ORAL at 20:49

## 2021-01-01 RX ADMIN — ISODIUM CHLORIDE 3 ML: 0.03 SOLUTION RESPIRATORY (INHALATION) at 23:52

## 2021-01-01 RX ADMIN — PANTOPRAZOLE SODIUM 40 MG: 40 TABLET, DELAYED RELEASE ORAL at 06:07

## 2021-01-01 RX ADMIN — PIPERACILLIN AND TAZOBACTAM 3.38 G: 36; 4.5 INJECTION, POWDER, FOR SOLUTION INTRAVENOUS at 06:39

## 2021-01-01 RX ADMIN — GLYCOPYRROLATE 0.1 MG: 0.2 INJECTION, SOLUTION INTRAMUSCULAR; INTRAVENOUS at 16:52

## 2021-01-01 RX ADMIN — PIPERACILLIN AND TAZOBACTAM 3.38 G: 36; 4.5 INJECTION, POWDER, FOR SOLUTION INTRAVENOUS at 12:11

## 2021-01-01 RX ADMIN — MORPHINE SULFATE 1 MG: 2 INJECTION, SOLUTION INTRAMUSCULAR; INTRAVENOUS at 16:51

## 2021-01-01 RX ADMIN — DEXTROSE 50 ML/HR: 5 SOLUTION INTRAVENOUS at 08:37

## 2021-01-01 RX ADMIN — ISODIUM CHLORIDE 3 ML: 0.03 SOLUTION RESPIRATORY (INHALATION) at 14:20

## 2021-01-01 RX ADMIN — ATORVASTATIN CALCIUM 40 MG: 40 TABLET, FILM COATED ORAL at 17:25

## 2021-01-01 RX ADMIN — B-COMPLEX W/ C & FOLIC ACID TAB 1 TABLET: TAB at 08:00

## 2021-01-01 RX ADMIN — ISODIUM CHLORIDE 3 ML: 0.03 SOLUTION RESPIRATORY (INHALATION) at 01:27

## 2021-01-01 RX ADMIN — IRON SUCROSE 200 MG: 20 INJECTION, SOLUTION INTRAVENOUS at 10:50

## 2021-01-01 RX ADMIN — PANTOPRAZOLE SODIUM 40 MG: 40 INJECTION, POWDER, FOR SOLUTION INTRAVENOUS at 02:20

## 2021-01-01 RX ADMIN — ISODIUM CHLORIDE 3 ML: 0.03 SOLUTION RESPIRATORY (INHALATION) at 14:24

## 2021-01-01 RX ADMIN — HEPARIN SODIUM 5000 UNITS: 5000 INJECTION INTRAVENOUS; SUBCUTANEOUS at 14:36

## 2021-01-01 RX ADMIN — ISODIUM CHLORIDE 3 ML: 0.03 SOLUTION RESPIRATORY (INHALATION) at 19:40

## 2021-01-01 RX ADMIN — LEVALBUTEROL HYDROCHLORIDE 1.25 MG: 1.25 SOLUTION, CONCENTRATE RESPIRATORY (INHALATION) at 19:05

## 2021-01-01 RX ADMIN — CEFEPIME HYDROCHLORIDE 2000 MG: 2 INJECTION, POWDER, FOR SOLUTION INTRAVENOUS at 22:36

## 2021-01-01 RX ADMIN — MORPHINE SULFATE 1 MG: 2 INJECTION, SOLUTION INTRAMUSCULAR; INTRAVENOUS at 21:11

## 2021-01-01 RX ADMIN — ISODIUM CHLORIDE 3 ML: 0.03 SOLUTION RESPIRATORY (INHALATION) at 19:53

## 2021-01-01 RX ADMIN — HEPARIN SODIUM 5000 UNITS: 5000 INJECTION INTRAVENOUS; SUBCUTANEOUS at 06:05

## 2021-01-01 RX ADMIN — HEPARIN SODIUM 5000 UNITS: 5000 INJECTION INTRAVENOUS; SUBCUTANEOUS at 15:21

## 2021-01-01 RX ADMIN — MORPHINE SULFATE 1 MG: 2 INJECTION, SOLUTION INTRAMUSCULAR; INTRAVENOUS at 08:27

## 2021-01-01 RX ADMIN — LOSARTAN POTASSIUM 100 MG: 50 TABLET, FILM COATED ORAL at 12:15

## 2021-01-01 RX ADMIN — ACETAMINOPHEN 975 MG: 325 TABLET, FILM COATED ORAL at 08:35

## 2021-01-01 RX ADMIN — DEXAMETHASONE SODIUM PHOSPHATE 4 MG: 4 INJECTION, SOLUTION INTRA-ARTICULAR; INTRALESIONAL; INTRAMUSCULAR; INTRAVENOUS; SOFT TISSUE at 16:38

## 2021-01-01 RX ADMIN — GLYCOPYRROLATE 0.1 MG: 0.2 INJECTION, SOLUTION INTRAMUSCULAR; INTRAVENOUS at 23:31

## 2021-01-01 RX ADMIN — TRAMADOL HYDROCHLORIDE 25 MG: 50 TABLET, FILM COATED ORAL at 15:20

## 2021-01-01 RX ADMIN — HEPARIN SODIUM 5000 UNITS: 5000 INJECTION INTRAVENOUS; SUBCUTANEOUS at 15:25

## 2021-01-01 RX ADMIN — ACETAMINOPHEN 650 MG: 650 SUPPOSITORY RECTAL at 12:00

## 2021-01-01 RX ADMIN — LEVALBUTEROL HYDROCHLORIDE 1.25 MG: 1.25 SOLUTION, CONCENTRATE RESPIRATORY (INHALATION) at 19:58

## 2021-01-01 RX ADMIN — CLINDAMYCIN PHOSPHATE 600 MG: 600 INJECTION, SOLUTION INTRAVENOUS at 23:52

## 2021-01-01 RX ADMIN — HEPARIN SODIUM 5000 UNITS: 5000 INJECTION INTRAVENOUS; SUBCUTANEOUS at 13:38

## 2021-01-01 RX ADMIN — PIPERACILLIN AND TAZOBACTAM 3.38 G: 36; 4.5 INJECTION, POWDER, FOR SOLUTION INTRAVENOUS at 06:16

## 2021-01-01 RX ADMIN — PIPERACILLIN AND TAZOBACTAM 3.38 G: 36; 4.5 INJECTION, POWDER, FOR SOLUTION INTRAVENOUS at 23:23

## 2021-01-01 RX ADMIN — MORPHINE SULFATE 2 MG: 2 INJECTION, SOLUTION INTRAMUSCULAR; INTRAVENOUS at 12:12

## 2021-01-01 RX ADMIN — DILTIAZEM HYDROCHLORIDE 15 MG: 5 INJECTION INTRAVENOUS at 21:55

## 2021-01-01 RX ADMIN — GLYCOPYRROLATE 0.1 MG: 0.2 INJECTION, SOLUTION INTRAMUSCULAR; INTRAVENOUS at 01:08

## 2021-01-01 RX ADMIN — LOSARTAN POTASSIUM 50 MG: 50 TABLET, FILM COATED ORAL at 11:20

## 2021-01-01 RX ADMIN — ISODIUM CHLORIDE 3 ML: 0.03 SOLUTION RESPIRATORY (INHALATION) at 20:02

## 2021-01-01 RX ADMIN — POTASSIUM CHLORIDE 20 MEQ: 14.9 INJECTION, SOLUTION INTRAVENOUS at 16:48

## 2021-01-01 RX ADMIN — SODIUM CHLORIDE, SODIUM GLUCONATE, SODIUM ACETATE, POTASSIUM CHLORIDE, MAGNESIUM CHLORIDE, SODIUM PHOSPHATE, DIBASIC, AND POTASSIUM PHOSPHATE 1300 ML: .53; .5; .37; .037; .03; .012; .00082 INJECTION, SOLUTION INTRAVENOUS at 03:16

## 2021-01-01 RX ADMIN — ONDANSETRON 4 MG: 2 INJECTION INTRAMUSCULAR; INTRAVENOUS at 16:44

## 2021-01-01 RX ADMIN — LEVALBUTEROL HYDROCHLORIDE 1.25 MG: 1.25 SOLUTION, CONCENTRATE RESPIRATORY (INHALATION) at 13:47

## 2021-01-01 RX ADMIN — LEVALBUTEROL HYDROCHLORIDE 1.25 MG: 1.25 SOLUTION, CONCENTRATE RESPIRATORY (INHALATION) at 07:30

## 2021-01-01 RX ADMIN — B-COMPLEX W/ C & FOLIC ACID TAB 1 TABLET: TAB at 09:34

## 2021-01-01 RX ADMIN — PRAVASTATIN SODIUM 40 MG: 40 TABLET ORAL at 15:43

## 2021-01-01 RX ADMIN — SODIUM CHLORIDE: 0.9 INJECTION, SOLUTION INTRAVENOUS at 16:00

## 2021-01-01 RX ADMIN — IOHEXOL 85 ML: 350 INJECTION, SOLUTION INTRAVENOUS at 11:56

## 2021-01-01 RX ADMIN — HEPARIN SODIUM 5000 UNITS: 5000 INJECTION INTRAVENOUS; SUBCUTANEOUS at 13:05

## 2021-01-01 RX ADMIN — CEFEPIME HYDROCHLORIDE 2000 MG: 2 INJECTION, POWDER, FOR SOLUTION INTRAVENOUS at 22:29

## 2021-01-01 RX ADMIN — DOCUSATE SODIUM 100 MG: 100 CAPSULE ORAL at 17:37

## 2021-01-01 RX ADMIN — ISODIUM CHLORIDE 3 ML: 0.03 SOLUTION RESPIRATORY (INHALATION) at 19:58

## 2021-01-01 RX ADMIN — LEVALBUTEROL HYDROCHLORIDE 1.25 MG: 1.25 SOLUTION, CONCENTRATE RESPIRATORY (INHALATION) at 13:09

## 2021-01-01 RX ADMIN — ETOMIDATE 10 MG: 20 INJECTION, SOLUTION INTRAVENOUS at 17:24

## 2021-01-01 RX ADMIN — ENOXAPARIN SODIUM 40 MG: 40 INJECTION SUBCUTANEOUS at 09:06

## 2021-01-01 RX ADMIN — LEVALBUTEROL HYDROCHLORIDE 1.25 MG: 1.25 SOLUTION, CONCENTRATE RESPIRATORY (INHALATION) at 20:02

## 2021-01-01 RX ADMIN — LEVALBUTEROL HYDROCHLORIDE 1.25 MG: 1.25 SOLUTION, CONCENTRATE RESPIRATORY (INHALATION) at 14:20

## 2021-01-01 RX ADMIN — ENOXAPARIN SODIUM 40 MG: 40 INJECTION SUBCUTANEOUS at 09:07

## 2021-01-01 RX ADMIN — DEXTROSE AND SODIUM CHLORIDE 50 ML/HR: 5; .45 INJECTION, SOLUTION INTRAVENOUS at 19:52

## 2021-01-01 RX ADMIN — ALBUMIN (HUMAN): 12.5 INJECTION, SOLUTION INTRAVENOUS at 16:08

## 2021-01-01 RX ADMIN — SODIUM CHLORIDE 50 ML/HR: 0.9 INJECTION, SOLUTION INTRAVENOUS at 09:45

## 2021-01-01 RX ADMIN — B-COMPLEX W/ C & FOLIC ACID TAB 1 TABLET: TAB at 08:11

## 2021-01-01 RX ADMIN — LEVALBUTEROL HYDROCHLORIDE 1.25 MG: 1.25 SOLUTION, CONCENTRATE RESPIRATORY (INHALATION) at 15:28

## 2021-01-01 RX ADMIN — ASPIRIN 81 MG: 81 TABLET, COATED ORAL at 09:28

## 2021-01-01 RX ADMIN — LOSARTAN POTASSIUM 100 MG: 50 TABLET, FILM COATED ORAL at 08:36

## 2021-01-01 RX ADMIN — ASPIRIN 81 MG: 81 TABLET, COATED ORAL at 12:05

## 2021-01-01 RX ADMIN — PIPERACILLIN AND TAZOBACTAM 3.38 G: 36; 4.5 INJECTION, POWDER, FOR SOLUTION INTRAVENOUS at 18:15

## 2021-01-01 RX ADMIN — GLYCOPYRROLATE 0.1 MG: 0.2 INJECTION, SOLUTION INTRAMUSCULAR; INTRAVENOUS at 16:22

## 2021-01-01 RX ADMIN — MEROPENEM 1000 MG: 1 INJECTION, POWDER, FOR SOLUTION INTRAVENOUS at 22:47

## 2021-01-01 RX ADMIN — LEVALBUTEROL HYDROCHLORIDE 1.25 MG: 1.25 SOLUTION, CONCENTRATE RESPIRATORY (INHALATION) at 01:27

## 2021-01-01 RX ADMIN — ISODIUM CHLORIDE 3 ML: 0.03 SOLUTION RESPIRATORY (INHALATION) at 07:10

## 2021-01-01 RX ADMIN — ATORVASTATIN CALCIUM 80 MG: 80 TABLET ORAL at 15:34

## 2021-01-01 RX ADMIN — DOCUSATE SODIUM 100 MG: 100 CAPSULE ORAL at 08:35

## 2021-01-01 RX ADMIN — SODIUM CHLORIDE, SODIUM GLUCONATE, SODIUM ACETATE, POTASSIUM CHLORIDE, MAGNESIUM CHLORIDE, SODIUM PHOSPHATE, DIBASIC, AND POTASSIUM PHOSPHATE 100 ML/HR: .53; .5; .37; .037; .03; .012; .00082 INJECTION, SOLUTION INTRAVENOUS at 21:46

## 2021-01-01 RX ADMIN — SODIUM CHLORIDE, SODIUM GLUCONATE, SODIUM ACETATE, POTASSIUM CHLORIDE, MAGNESIUM CHLORIDE, SODIUM PHOSPHATE, DIBASIC, AND POTASSIUM PHOSPHATE 100 ML/HR: .53; .5; .37; .037; .03; .012; .00082 INJECTION, SOLUTION INTRAVENOUS at 06:24

## 2021-01-01 RX ADMIN — POTASSIUM CHLORIDE 40 MEQ: 1500 TABLET, EXTENDED RELEASE ORAL at 09:28

## 2021-01-01 RX ADMIN — GUAIFENESIN 600 MG: 600 TABLET, EXTENDED RELEASE ORAL at 17:37

## 2021-01-01 RX ADMIN — SODIUM CHLORIDE, SODIUM GLUCONATE, SODIUM ACETATE, POTASSIUM CHLORIDE, MAGNESIUM CHLORIDE, SODIUM PHOSPHATE, DIBASIC, AND POTASSIUM PHOSPHATE 60 ML/HR: .53; .5; .37; .037; .03; .012; .00082 INJECTION, SOLUTION INTRAVENOUS at 09:25

## 2021-01-01 RX ADMIN — ISODIUM CHLORIDE 3 ML: 0.03 SOLUTION RESPIRATORY (INHALATION) at 07:30

## 2021-01-01 RX ADMIN — MAGNESIUM SULFATE HEPTAHYDRATE 2 G: 40 INJECTION, SOLUTION INTRAVENOUS at 02:00

## 2021-01-01 RX ADMIN — LEVALBUTEROL HYDROCHLORIDE 1.25 MG: 1.25 SOLUTION, CONCENTRATE RESPIRATORY (INHALATION) at 07:52

## 2021-01-01 RX ADMIN — CEFTRIAXONE 1000 MG: 1 INJECTION, SOLUTION INTRAVENOUS at 11:44

## 2021-01-01 RX ADMIN — B-COMPLEX W/ C & FOLIC ACID TAB 1 TABLET: TAB at 08:30

## 2021-01-01 RX ADMIN — SODIUM CHLORIDE, SODIUM GLUCONATE, SODIUM ACETATE, POTASSIUM CHLORIDE, MAGNESIUM CHLORIDE, SODIUM PHOSPHATE, DIBASIC, AND POTASSIUM PHOSPHATE 60 ML/HR: .53; .5; .37; .037; .03; .012; .00082 INJECTION, SOLUTION INTRAVENOUS at 06:00

## 2021-01-01 RX ADMIN — ACETAMINOPHEN 325 MG: 325 SUPPOSITORY RECTAL at 06:36

## 2021-01-01 RX ADMIN — ACETAMINOPHEN 975 MG: 325 TABLET, FILM COATED ORAL at 06:31

## 2021-01-01 RX ADMIN — Medication 250 MG: at 08:30

## 2021-01-01 RX ADMIN — PANTOPRAZOLE SODIUM 40 MG: 40 TABLET, DELAYED RELEASE ORAL at 05:20

## 2021-01-01 RX ADMIN — ENOXAPARIN SODIUM 40 MG: 40 INJECTION SUBCUTANEOUS at 08:49

## 2021-01-01 RX ADMIN — LEVALBUTEROL HYDROCHLORIDE 1.25 MG: 1.25 SOLUTION, CONCENTRATE RESPIRATORY (INHALATION) at 23:54

## 2021-01-01 RX ADMIN — HEPARIN SODIUM 5000 UNITS: 5000 INJECTION INTRAVENOUS; SUBCUTANEOUS at 13:42

## 2021-01-01 RX ADMIN — LEVALBUTEROL HYDROCHLORIDE 1.25 MG: 1.25 SOLUTION, CONCENTRATE RESPIRATORY (INHALATION) at 02:09

## 2021-01-01 RX ADMIN — PIPERACILLIN AND TAZOBACTAM 3.38 G: 36; 4.5 INJECTION, POWDER, FOR SOLUTION INTRAVENOUS at 17:13

## 2021-01-01 RX ADMIN — ASPIRIN 81 MG: 81 TABLET, COATED ORAL at 15:20

## 2021-01-01 RX ADMIN — ISODIUM CHLORIDE 3 ML: 0.03 SOLUTION RESPIRATORY (INHALATION) at 19:05

## 2021-01-01 RX ADMIN — ACETAMINOPHEN 975 MG: 325 TABLET, FILM COATED ORAL at 21:39

## 2021-01-01 RX ADMIN — DOCUSATE SODIUM 100 MG: 100 CAPSULE ORAL at 11:20

## 2021-01-01 RX ADMIN — POTASSIUM CHLORIDE 20 MEQ: 14.9 INJECTION, SOLUTION INTRAVENOUS at 12:07

## 2021-01-01 RX ADMIN — DOCUSATE SODIUM 100 MG: 100 CAPSULE ORAL at 17:08

## 2021-01-01 RX ADMIN — ACETAMINOPHEN 975 MG: 325 TABLET, FILM COATED ORAL at 15:21

## 2021-01-01 RX ADMIN — ISODIUM CHLORIDE 3 ML: 0.03 SOLUTION RESPIRATORY (INHALATION) at 23:54

## 2021-01-01 RX ADMIN — LABETALOL 20 MG/4 ML (5 MG/ML) INTRAVENOUS SYRINGE 5 MG: at 18:31

## 2021-01-01 RX ADMIN — DOCUSATE SODIUM 100 MG: 100 CAPSULE ORAL at 17:29

## 2021-01-01 RX ADMIN — FENTANYL CITRATE 50 MCG: 50 INJECTION, SOLUTION INTRAMUSCULAR; INTRAVENOUS at 16:04

## 2021-01-01 RX ADMIN — LOSARTAN POTASSIUM 50 MG: 50 TABLET, FILM COATED ORAL at 10:33

## 2021-01-01 RX ADMIN — ACETAMINOPHEN 975 MG: 325 TABLET, FILM COATED ORAL at 21:08

## 2021-01-01 RX ADMIN — HEPARIN SODIUM 5000 UNITS: 5000 INJECTION INTRAVENOUS; SUBCUTANEOUS at 15:54

## 2021-01-01 RX ADMIN — DEXTROSE 75 ML/HR: 5 SOLUTION INTRAVENOUS at 08:30

## 2021-01-01 RX ADMIN — Medication 1 TABLET: at 09:42

## 2021-01-01 RX ADMIN — FERROUS SULFATE TAB 325 MG (65 MG ELEMENTAL FE) 325 MG: 325 (65 FE) TAB at 09:43

## 2021-01-01 RX ADMIN — LEVALBUTEROL HYDROCHLORIDE 1.25 MG: 1.25 SOLUTION, CONCENTRATE RESPIRATORY (INHALATION) at 14:46

## 2021-01-01 RX ADMIN — MORPHINE SULFATE 2 MG: 2 INJECTION, SOLUTION INTRAMUSCULAR; INTRAVENOUS at 16:20

## 2021-01-01 RX ADMIN — POTASSIUM CHLORIDE 20 MEQ: 14.9 INJECTION, SOLUTION INTRAVENOUS at 15:36

## 2021-01-01 RX ADMIN — ISODIUM CHLORIDE 3 ML: 0.03 SOLUTION RESPIRATORY (INHALATION) at 08:14

## 2021-01-01 RX ADMIN — ISODIUM CHLORIDE 3 ML: 0.03 SOLUTION RESPIRATORY (INHALATION) at 14:50

## 2021-01-01 RX ADMIN — MORPHINE SULFATE 2 MG: 2 INJECTION, SOLUTION INTRAMUSCULAR; INTRAVENOUS at 20:11

## 2021-01-01 RX ADMIN — ENOXAPARIN SODIUM 40 MG: 40 INJECTION SUBCUTANEOUS at 09:25

## 2021-01-01 RX ADMIN — PIPERACILLIN AND TAZOBACTAM 3.38 G: 36; 4.5 INJECTION, POWDER, FOR SOLUTION INTRAVENOUS at 17:38

## 2021-01-01 RX ADMIN — HEPARIN SODIUM 5000 UNITS: 5000 INJECTION INTRAVENOUS; SUBCUTANEOUS at 22:44

## 2021-01-01 RX ADMIN — ROCURONIUM BROMIDE 10 MG: 10 INJECTION, SOLUTION INTRAVENOUS at 17:00

## 2021-01-01 RX ADMIN — Medication 250 MG: at 09:33

## 2021-01-01 RX ADMIN — ASPIRIN 300 MG: 300 SUPPOSITORY RECTAL at 09:01

## 2021-01-01 RX ADMIN — PIPERACILLIN AND TAZOBACTAM 3.38 G: 36; 4.5 INJECTION, POWDER, FOR SOLUTION INTRAVENOUS at 12:00

## 2021-01-01 RX ADMIN — DOCUSATE SODIUM 100 MG: 100 CAPSULE ORAL at 09:30

## 2021-01-01 RX ADMIN — ISODIUM CHLORIDE 3 ML: 0.03 SOLUTION RESPIRATORY (INHALATION) at 08:11

## 2021-01-01 RX ADMIN — HEPARIN SODIUM 5000 UNITS: 5000 INJECTION INTRAVENOUS; SUBCUTANEOUS at 22:53

## 2021-01-01 RX ADMIN — ISODIUM CHLORIDE 3 ML: 0.03 SOLUTION RESPIRATORY (INHALATION) at 14:46

## 2021-01-01 RX ADMIN — ACETAMINOPHEN 975 MG: 325 TABLET, FILM COATED ORAL at 22:05

## 2021-01-01 RX ADMIN — HEPARIN SODIUM 5000 UNITS: 5000 INJECTION INTRAVENOUS; SUBCUTANEOUS at 06:10

## 2021-01-01 RX ADMIN — PIPERACILLIN AND TAZOBACTAM 3.38 G: 36; 4.5 INJECTION, POWDER, FOR SOLUTION INTRAVENOUS at 12:33

## 2021-01-01 RX ADMIN — ATORVASTATIN CALCIUM 40 MG: 40 TABLET, FILM COATED ORAL at 16:10

## 2021-01-01 RX ADMIN — POTASSIUM CHLORIDE 20 MEQ: 14.9 INJECTION, SOLUTION INTRAVENOUS at 23:50

## 2021-01-01 RX ADMIN — HEPARIN SODIUM 12 UNITS/KG/HR: 10000 INJECTION, SOLUTION INTRAVENOUS at 05:34

## 2021-01-01 RX ADMIN — HEPARIN SODIUM 5000 UNITS: 5000 INJECTION INTRAVENOUS; SUBCUTANEOUS at 14:11

## 2021-01-01 RX ADMIN — DOCUSATE SODIUM 100 MG: 100 CAPSULE ORAL at 09:33

## 2021-01-01 RX ADMIN — CEFAZOLIN SODIUM 2000 MG: 2 SOLUTION INTRAVENOUS at 00:53

## 2021-01-01 RX ADMIN — LEVALBUTEROL HYDROCHLORIDE 1.25 MG: 1.25 SOLUTION, CONCENTRATE RESPIRATORY (INHALATION) at 08:14

## 2021-01-01 RX ADMIN — MORPHINE SULFATE 2 MG: 2 INJECTION, SOLUTION INTRAMUSCULAR; INTRAVENOUS at 21:36

## 2021-01-01 RX ADMIN — SCOPALAMINE 1 PATCH: 1 PATCH, EXTENDED RELEASE TRANSDERMAL at 11:38

## 2021-01-01 RX ADMIN — ATORVASTATIN CALCIUM 80 MG: 80 TABLET ORAL at 15:57

## 2021-01-01 RX ADMIN — SODIUM CHLORIDE 75 ML/HR: 0.9 INJECTION, SOLUTION INTRAVENOUS at 20:50

## 2021-01-01 RX ADMIN — LEVALBUTEROL HYDROCHLORIDE 1.25 MG: 1.25 SOLUTION, CONCENTRATE RESPIRATORY (INHALATION) at 14:50

## 2021-01-01 RX ADMIN — DOCUSATE SODIUM 100 MG: 100 CAPSULE ORAL at 17:14

## 2021-01-01 RX ADMIN — ACETAMINOPHEN 975 MG: 325 TABLET, FILM COATED ORAL at 13:33

## 2021-01-01 RX ADMIN — POTASSIUM CHLORIDE 20 MEQ: 14.9 INJECTION, SOLUTION INTRAVENOUS at 19:21

## 2021-01-01 RX ADMIN — POTASSIUM CHLORIDE 20 MEQ: 200 INJECTION, SOLUTION INTRAVENOUS at 10:57

## 2021-01-01 RX ADMIN — LIDOCAINE HYDROCHLORIDE 40 MG: 10 INJECTION, SOLUTION EPIDURAL; INFILTRATION; INTRACAUDAL; PERINEURAL at 16:05

## 2021-01-01 RX ADMIN — DOXYCYCLINE 100 MG: 100 INJECTION, POWDER, LYOPHILIZED, FOR SOLUTION INTRAVENOUS at 12:27

## 2021-01-01 RX ADMIN — MORPHINE SULFATE 2 MG: 2 INJECTION, SOLUTION INTRAMUSCULAR; INTRAVENOUS at 05:44

## 2021-01-01 RX ADMIN — HEPARIN SODIUM 5000 UNITS: 5000 INJECTION INTRAVENOUS; SUBCUTANEOUS at 05:32

## 2021-01-01 RX ADMIN — ISODIUM CHLORIDE 3 ML: 0.03 SOLUTION RESPIRATORY (INHALATION) at 07:52

## 2021-01-01 RX ADMIN — POTASSIUM CHLORIDE 20 MEQ: 14.9 INJECTION, SOLUTION INTRAVENOUS at 05:38

## 2021-01-01 RX ADMIN — ENOXAPARIN SODIUM 40 MG: 40 INJECTION SUBCUTANEOUS at 10:00

## 2021-01-01 RX ADMIN — DEXTROSE 50 ML/HR: 5 SOLUTION INTRAVENOUS at 05:34

## 2021-01-01 RX ADMIN — ACETAMINOPHEN 975 MG: 325 TABLET, FILM COATED ORAL at 21:41

## 2021-01-01 RX ADMIN — FERROUS SULFATE TAB 325 MG (65 MG ELEMENTAL FE) 325 MG: 325 (65 FE) TAB at 09:34

## 2021-01-01 RX ADMIN — ATORVASTATIN CALCIUM 80 MG: 80 TABLET ORAL at 17:12

## 2021-01-01 RX ADMIN — LEVALBUTEROL HYDROCHLORIDE 1.25 MG: 1.25 SOLUTION, CONCENTRATE RESPIRATORY (INHALATION) at 19:33

## 2021-01-01 RX ADMIN — GUAIFENESIN 600 MG: 600 TABLET, EXTENDED RELEASE ORAL at 09:30

## 2021-01-01 RX ADMIN — ACETAMINOPHEN 975 MG: 325 TABLET, FILM COATED ORAL at 06:01

## 2021-01-01 RX ADMIN — DOCUSATE SODIUM 100 MG: 100 CAPSULE ORAL at 09:43

## 2021-01-01 RX ADMIN — ACETAMINOPHEN 975 MG: 325 TABLET, FILM COATED ORAL at 05:45

## 2021-01-01 RX ADMIN — VANCOMYCIN HYDROCHLORIDE 750 MG: 750 INJECTION, SOLUTION INTRAVENOUS at 22:23

## 2021-01-01 RX ADMIN — PIPERACILLIN AND TAZOBACTAM 3.38 G: 36; 4.5 INJECTION, POWDER, FOR SOLUTION INTRAVENOUS at 00:59

## 2021-01-01 RX ADMIN — PANTOPRAZOLE SODIUM 40 MG: 40 TABLET, DELAYED RELEASE ORAL at 06:32

## 2021-01-01 RX ADMIN — Medication 1 TABLET: at 17:14

## 2021-01-01 RX ADMIN — FENTANYL CITRATE 25 MCG: 50 INJECTION, SOLUTION INTRAMUSCULAR; INTRAVENOUS at 17:42

## 2021-01-01 RX ADMIN — ATORVASTATIN CALCIUM 80 MG: 80 TABLET ORAL at 16:07

## 2021-01-01 RX ADMIN — LOSARTAN POTASSIUM 50 MG: 50 TABLET, FILM COATED ORAL at 09:30

## 2021-01-01 RX ADMIN — Medication 250 MG: at 08:11

## 2021-01-01 RX ADMIN — GLYCOPYRROLATE 0.1 MG: 0.2 INJECTION, SOLUTION INTRAMUSCULAR; INTRAVENOUS at 08:27

## 2021-01-01 RX ADMIN — ISODIUM CHLORIDE 3 ML: 0.03 SOLUTION RESPIRATORY (INHALATION) at 14:28

## 2021-01-01 RX ADMIN — HEPARIN SODIUM 5000 UNITS: 5000 INJECTION INTRAVENOUS; SUBCUTANEOUS at 21:21

## 2021-01-01 RX ADMIN — Medication 250 MG: at 09:45

## 2021-01-01 RX ADMIN — ISODIUM CHLORIDE 3 ML: 0.03 SOLUTION RESPIRATORY (INHALATION) at 19:44

## 2021-01-01 RX ADMIN — FOLIC ACID 1 MG: 1 TABLET ORAL at 08:30

## 2021-01-01 RX ADMIN — LEVALBUTEROL HYDROCHLORIDE 1.25 MG: 1.25 SOLUTION, CONCENTRATE RESPIRATORY (INHALATION) at 08:24

## 2021-01-01 RX ADMIN — ATORVASTATIN CALCIUM 80 MG: 80 TABLET ORAL at 17:14

## 2021-01-01 RX ADMIN — FOLIC ACID 1 MG: 1 TABLET ORAL at 09:30

## 2021-01-01 RX ADMIN — B-COMPLEX W/ C & FOLIC ACID TAB 1 TABLET: TAB at 08:26

## 2021-01-01 RX ADMIN — MORPHINE SULFATE 1 MG: 2 INJECTION, SOLUTION INTRAMUSCULAR; INTRAVENOUS at 04:44

## 2021-01-01 RX ADMIN — HEPARIN SODIUM 5000 UNITS: 5000 INJECTION INTRAVENOUS; SUBCUTANEOUS at 06:07

## 2021-01-01 RX ADMIN — ACETAMINOPHEN 975 MG: 325 TABLET, FILM COATED ORAL at 05:00

## 2021-01-01 RX ADMIN — PIPERACILLIN AND TAZOBACTAM 3.38 G: 36; 4.5 INJECTION, POWDER, FOR SOLUTION INTRAVENOUS at 17:32

## 2021-01-01 RX ADMIN — MORPHINE SULFATE 1 MG: 2 INJECTION, SOLUTION INTRAMUSCULAR; INTRAVENOUS at 12:29

## 2021-01-01 RX ADMIN — DEXTROSE 50 ML/HR: 5 SOLUTION INTRAVENOUS at 15:28

## 2021-01-01 RX ADMIN — ATORVASTATIN CALCIUM 40 MG: 40 TABLET, FILM COATED ORAL at 17:26

## 2021-01-01 RX ADMIN — PIPERACILLIN AND TAZOBACTAM 3.38 G: 36; 4.5 INJECTION, POWDER, FOR SOLUTION INTRAVENOUS at 05:16

## 2021-01-01 RX ADMIN — LEVALBUTEROL HYDROCHLORIDE 0.63 MG: 0.63 SOLUTION RESPIRATORY (INHALATION) at 19:49

## 2021-01-01 RX ADMIN — HEPARIN SODIUM 5000 UNITS: 5000 INJECTION INTRAVENOUS; SUBCUTANEOUS at 17:29

## 2021-01-01 RX ADMIN — HEPARIN SODIUM 5000 UNITS: 5000 INJECTION INTRAVENOUS; SUBCUTANEOUS at 13:49

## 2021-01-01 RX ADMIN — HYDROCHLOROTHIAZIDE 25 MG: 25 TABLET ORAL at 13:19

## 2021-01-01 RX ADMIN — GLYCOPYRROLATE 0.1 MG: 0.2 INJECTION, SOLUTION INTRAMUSCULAR; INTRAVENOUS at 05:44

## 2021-01-01 RX ADMIN — PANTOPRAZOLE SODIUM 40 MG: 40 TABLET, DELAYED RELEASE ORAL at 06:02

## 2021-01-01 RX ADMIN — SODIUM CHLORIDE 50 ML/HR: 0.9 INJECTION, SOLUTION INTRAVENOUS at 06:38

## 2021-01-01 RX ADMIN — IRON SUCROSE 200 MG: 20 INJECTION, SOLUTION INTRAVENOUS at 09:50

## 2021-01-01 RX ADMIN — DEXTROSE 50 ML/HR: 5 SOLUTION INTRAVENOUS at 14:26

## 2021-01-01 RX ADMIN — LEVALBUTEROL HYDROCHLORIDE 1.25 MG: 1.25 SOLUTION, CONCENTRATE RESPIRATORY (INHALATION) at 14:24

## 2021-01-01 RX ADMIN — SODIUM CHLORIDE, SODIUM GLUCONATE, SODIUM ACETATE, POTASSIUM CHLORIDE, MAGNESIUM CHLORIDE, SODIUM PHOSPHATE, DIBASIC, AND POTASSIUM PHOSPHATE 500 ML: .53; .5; .37; .037; .03; .012; .00082 INJECTION, SOLUTION INTRAVENOUS at 08:03

## 2021-01-01 RX ADMIN — PANTOPRAZOLE SODIUM 40 MG: 40 TABLET, DELAYED RELEASE ORAL at 06:03

## 2021-01-01 RX ADMIN — CEFEPIME HYDROCHLORIDE 2000 MG: 2 INJECTION, POWDER, FOR SOLUTION INTRAVENOUS at 22:25

## 2021-01-01 RX ADMIN — HEPARIN SODIUM 5000 UNITS: 5000 INJECTION INTRAVENOUS; SUBCUTANEOUS at 21:52

## 2021-01-01 RX ADMIN — SODIUM CHLORIDE, SODIUM GLUCONATE, SODIUM ACETATE, POTASSIUM CHLORIDE, MAGNESIUM CHLORIDE, SODIUM PHOSPHATE, DIBASIC, AND POTASSIUM PHOSPHATE 500 ML: .53; .5; .37; .037; .03; .012; .00082 INJECTION, SOLUTION INTRAVENOUS at 00:30

## 2021-01-01 RX ADMIN — SODIUM CHLORIDE, SODIUM GLUCONATE, SODIUM ACETATE, POTASSIUM CHLORIDE, MAGNESIUM CHLORIDE, SODIUM PHOSPHATE, DIBASIC, AND POTASSIUM PHOSPHATE 60 ML/HR: .53; .5; .37; .037; .03; .012; .00082 INJECTION, SOLUTION INTRAVENOUS at 15:38

## 2021-01-01 RX ADMIN — FOLIC ACID 1 MG: 1 TABLET ORAL at 09:34

## 2021-01-01 RX ADMIN — VANCOMYCIN HYDROCHLORIDE 1000 MG: 1 INJECTION, SOLUTION INTRAVENOUS at 03:18

## 2021-01-01 RX ADMIN — POTASSIUM CHLORIDE 20 MEQ: 14.9 INJECTION, SOLUTION INTRAVENOUS at 08:59

## 2021-01-01 RX ADMIN — DOCUSATE SODIUM 100 MG: 100 CAPSULE ORAL at 17:12

## 2021-01-01 RX ADMIN — CEFEPIME HYDROCHLORIDE 2000 MG: 2 INJECTION, POWDER, FOR SOLUTION INTRAVENOUS at 23:01

## 2021-01-01 RX ADMIN — ACETAMINOPHEN 975 MG: 325 TABLET, FILM COATED ORAL at 21:52

## 2021-01-01 RX ADMIN — PANTOPRAZOLE SODIUM 40 MG: 40 TABLET, DELAYED RELEASE ORAL at 06:38

## 2021-01-01 RX ADMIN — CEFEPIME HYDROCHLORIDE 2000 MG: 2 INJECTION, POWDER, FOR SOLUTION INTRAVENOUS at 00:07

## 2021-01-01 RX ADMIN — HEPARIN SODIUM 5000 UNITS: 5000 INJECTION INTRAVENOUS; SUBCUTANEOUS at 06:31

## 2021-01-01 RX ADMIN — LEVALBUTEROL HYDROCHLORIDE 1.25 MG: 1.25 SOLUTION, CONCENTRATE RESPIRATORY (INHALATION) at 07:19

## 2021-01-01 RX ADMIN — HEPARIN SODIUM 5000 UNITS: 5000 INJECTION INTRAVENOUS; SUBCUTANEOUS at 22:05

## 2021-01-01 RX ADMIN — CEFEPIME HYDROCHLORIDE 2000 MG: 2 INJECTION, POWDER, FOR SOLUTION INTRAVENOUS at 23:30

## 2021-01-01 RX ADMIN — POTASSIUM CHLORIDE 20 MEQ: 14.9 INJECTION, SOLUTION INTRAVENOUS at 09:00

## 2021-01-01 RX ADMIN — PIPERACILLIN AND TAZOBACTAM 3.38 G: 36; 4.5 INJECTION, POWDER, FOR SOLUTION INTRAVENOUS at 06:24

## 2021-01-01 RX ADMIN — HEPARIN SODIUM 5000 UNITS: 5000 INJECTION INTRAVENOUS; SUBCUTANEOUS at 21:38

## 2021-01-01 RX ADMIN — PIPERACILLIN AND TAZOBACTAM 3.38 G: 36; 4.5 INJECTION, POWDER, FOR SOLUTION INTRAVENOUS at 12:18

## 2021-01-01 RX ADMIN — FOLIC ACID 1 MG: 1 TABLET ORAL at 11:20

## 2021-01-01 RX ADMIN — CEFAZOLIN SODIUM 2000 MG: 2 SOLUTION INTRAVENOUS at 08:00

## 2021-01-01 RX ADMIN — DOCUSATE SODIUM 100 MG: 100 CAPSULE ORAL at 08:26

## 2021-01-01 RX ADMIN — HEPARIN SODIUM 5000 UNITS: 5000 INJECTION INTRAVENOUS; SUBCUTANEOUS at 21:40

## 2021-01-01 RX ADMIN — SODIUM CHLORIDE, SODIUM GLUCONATE, SODIUM ACETATE, POTASSIUM CHLORIDE, MAGNESIUM CHLORIDE, SODIUM PHOSPHATE, DIBASIC, AND POTASSIUM PHOSPHATE 60 ML/HR: .53; .5; .37; .037; .03; .012; .00082 INJECTION, SOLUTION INTRAVENOUS at 23:56

## 2021-01-01 RX ADMIN — POLYETHYLENE GLYCOL 3350 17 G: 17 POWDER, FOR SOLUTION ORAL at 15:21

## 2021-01-01 RX ADMIN — ROCURONIUM BROMIDE 30 MG: 10 INJECTION, SOLUTION INTRAVENOUS at 16:05

## 2021-01-01 RX ADMIN — HEPARIN SODIUM 5000 UNITS: 5000 INJECTION INTRAVENOUS; SUBCUTANEOUS at 21:08

## 2021-01-01 RX ADMIN — PANTOPRAZOLE SODIUM 40 MG: 40 TABLET, DELAYED RELEASE ORAL at 08:00

## 2021-01-01 RX ADMIN — DOCUSATE SODIUM 100 MG: 100 CAPSULE ORAL at 08:30

## 2021-01-01 RX ADMIN — HEPARIN SODIUM 5000 UNITS: 5000 INJECTION INTRAVENOUS; SUBCUTANEOUS at 22:35

## 2021-01-01 RX ADMIN — ASPIRIN 81 MG: 81 TABLET, COATED ORAL at 09:34

## 2021-01-01 RX ADMIN — HEPARIN SODIUM 5000 UNITS: 5000 INJECTION INTRAVENOUS; SUBCUTANEOUS at 06:37

## 2021-01-01 RX ADMIN — ACETAMINOPHEN 975 MG: 325 TABLET, FILM COATED ORAL at 06:07

## 2021-01-01 RX ADMIN — PIPERACILLIN AND TAZOBACTAM 3.38 G: 36; 4.5 INJECTION, POWDER, FOR SOLUTION INTRAVENOUS at 23:49

## 2021-01-01 RX ADMIN — PIPERACILLIN AND TAZOBACTAM 3.38 G: 36; 4.5 INJECTION, POWDER, FOR SOLUTION INTRAVENOUS at 13:00

## 2021-01-01 RX ADMIN — DEXTROSE 75 ML/HR: 5 SOLUTION INTRAVENOUS at 22:38

## 2021-01-01 RX ADMIN — ACETAMINOPHEN 975 MG: 325 TABLET, FILM COATED ORAL at 21:38

## 2021-01-01 RX ADMIN — ISODIUM CHLORIDE 3 ML: 0.03 SOLUTION RESPIRATORY (INHALATION) at 02:09

## 2021-01-01 RX ADMIN — FENTANYL CITRATE 25 MCG: 50 INJECTION, SOLUTION INTRAMUSCULAR; INTRAVENOUS at 17:01

## 2021-01-01 RX ADMIN — PIPERACILLIN AND TAZOBACTAM 3.38 G: 36; 4.5 INJECTION, POWDER, FOR SOLUTION INTRAVENOUS at 17:47

## 2021-01-01 RX ADMIN — GLYCOPYRROLATE 0.1 MG: 0.2 INJECTION, SOLUTION INTRAMUSCULAR; INTRAVENOUS at 21:21

## 2021-01-01 RX ADMIN — LOSARTAN POTASSIUM 50 MG: 50 TABLET, FILM COATED ORAL at 09:28

## 2021-01-01 RX ADMIN — ASPIRIN 81 MG: 81 TABLET, COATED ORAL at 09:30

## 2021-01-01 RX ADMIN — POTASSIUM CHLORIDE 40 MEQ: 1500 TABLET, EXTENDED RELEASE ORAL at 11:56

## 2021-01-01 RX ADMIN — FERROUS SULFATE TAB 325 MG (65 MG ELEMENTAL FE) 325 MG: 325 (65 FE) TAB at 11:20

## 2021-01-01 RX ADMIN — GUAIFENESIN 600 MG: 600 TABLET, EXTENDED RELEASE ORAL at 17:03

## 2021-01-01 RX ADMIN — PROPOFOL 100 MG: 10 INJECTION, EMULSION INTRAVENOUS at 16:05

## 2021-01-01 RX ADMIN — DOCUSATE SODIUM 100 MG: 100 CAPSULE ORAL at 08:11

## 2021-01-01 RX ADMIN — LEVALBUTEROL HYDROCHLORIDE 1.25 MG: 1.25 SOLUTION, CONCENTRATE RESPIRATORY (INHALATION) at 08:11

## 2021-01-01 RX ADMIN — HEPARIN SODIUM 5000 UNITS: 5000 INJECTION INTRAVENOUS; SUBCUTANEOUS at 05:01

## 2021-01-01 RX ADMIN — HEPARIN SODIUM 5000 UNITS: 5000 INJECTION INTRAVENOUS; SUBCUTANEOUS at 05:20

## 2021-01-01 RX ADMIN — LEVALBUTEROL HYDROCHLORIDE 1.25 MG: 1.25 SOLUTION, CONCENTRATE RESPIRATORY (INHALATION) at 14:28

## 2021-01-01 RX ADMIN — MORPHINE SULFATE 1 MG: 2 INJECTION, SOLUTION INTRAMUSCULAR; INTRAVENOUS at 01:05

## 2021-01-01 RX ADMIN — HEPARIN SODIUM 5000 UNITS: 5000 INJECTION INTRAVENOUS; SUBCUTANEOUS at 14:22

## 2021-01-01 RX ADMIN — SODIUM CHLORIDE, SODIUM GLUCONATE, SODIUM ACETATE, POTASSIUM CHLORIDE, MAGNESIUM CHLORIDE, SODIUM PHOSPHATE, DIBASIC, AND POTASSIUM PHOSPHATE 60 ML/HR: .53; .5; .37; .037; .03; .012; .00082 INJECTION, SOLUTION INTRAVENOUS at 15:57

## 2021-01-01 RX ADMIN — DOCUSATE SODIUM 100 MG: 100 CAPSULE ORAL at 08:00

## 2021-01-01 RX ADMIN — HEPARIN SODIUM 5000 UNITS: 5000 INJECTION INTRAVENOUS; SUBCUTANEOUS at 22:36

## 2021-01-01 RX ADMIN — ISODIUM CHLORIDE 3 ML: 0.03 SOLUTION RESPIRATORY (INHALATION) at 13:09

## 2021-01-01 RX ADMIN — VANCOMYCIN HYDROCHLORIDE 750 MG: 750 INJECTION, SOLUTION INTRAVENOUS at 23:04

## 2021-01-01 RX ADMIN — POTASSIUM CHLORIDE 20 MEQ: 14.9 INJECTION, SOLUTION INTRAVENOUS at 10:04

## 2021-01-01 RX ADMIN — DEXTROSE AND SODIUM CHLORIDE 50 ML/HR: 5; .9 INJECTION, SOLUTION INTRAVENOUS at 23:59

## 2021-01-01 RX ADMIN — CEFEPIME HYDROCHLORIDE 2000 MG: 2 INJECTION, POWDER, FOR SOLUTION INTRAVENOUS at 22:52

## 2021-01-01 RX ADMIN — DOCUSATE SODIUM 100 MG: 100 CAPSULE ORAL at 17:27

## 2021-01-01 RX ADMIN — CEFAZOLIN SODIUM 1000 MG: 1 SOLUTION INTRAVENOUS at 16:10

## 2021-01-01 RX ADMIN — ISODIUM CHLORIDE 3 ML: 0.03 SOLUTION RESPIRATORY (INHALATION) at 13:20

## 2021-01-01 RX ADMIN — PANTOPRAZOLE SODIUM 40 MG: 40 TABLET, DELAYED RELEASE ORAL at 05:45

## 2021-01-01 RX ADMIN — HEPARIN SODIUM 5000 UNITS: 5000 INJECTION INTRAVENOUS; SUBCUTANEOUS at 15:22

## 2021-01-01 RX ADMIN — HEPARIN SODIUM 5000 UNITS: 5000 INJECTION INTRAVENOUS; SUBCUTANEOUS at 21:39

## 2021-01-01 RX ADMIN — HEPARIN SODIUM 5000 UNITS: 5000 INJECTION INTRAVENOUS; SUBCUTANEOUS at 05:45

## 2021-01-01 RX ADMIN — HEPARIN SODIUM 5000 UNITS: 5000 INJECTION INTRAVENOUS; SUBCUTANEOUS at 06:30

## 2021-01-01 RX ADMIN — ASPIRIN 81 MG: 81 TABLET, COATED ORAL at 09:43

## 2021-01-01 RX ADMIN — MORPHINE SULFATE 2 MG: 2 INJECTION, SOLUTION INTRAMUSCULAR; INTRAVENOUS at 12:46

## 2021-01-01 RX ADMIN — LEVALBUTEROL HYDROCHLORIDE 1.25 MG: 1.25 SOLUTION, CONCENTRATE RESPIRATORY (INHALATION) at 13:20

## 2021-01-01 RX ADMIN — ASPIRIN 81 MG: 81 TABLET, COATED ORAL at 08:11

## 2021-01-01 RX ADMIN — PIPERACILLIN AND TAZOBACTAM 3.38 G: 36; 4.5 INJECTION, POWDER, FOR SOLUTION INTRAVENOUS at 17:29

## 2021-01-01 RX ADMIN — LEVALBUTEROL HYDROCHLORIDE 1.25 MG: 1.25 SOLUTION, CONCENTRATE RESPIRATORY (INHALATION) at 19:53

## 2021-01-01 RX ADMIN — ASPIRIN 81 MG: 81 TABLET, COATED ORAL at 09:27

## 2021-01-01 RX ADMIN — ISODIUM CHLORIDE 3 ML: 0.03 SOLUTION RESPIRATORY (INHALATION) at 19:33

## 2021-01-01 RX ADMIN — ACETAMINOPHEN 975 MG: 325 TABLET, FILM COATED ORAL at 13:04

## 2021-01-01 RX ADMIN — LOSARTAN POTASSIUM 50 MG: 50 TABLET, FILM COATED ORAL at 09:27

## 2021-01-01 RX ADMIN — ETOMIDATE 10 MG: 2 INJECTION INTRAVENOUS at 17:24

## 2021-01-01 RX ADMIN — HEPARIN SODIUM 5000 UNITS: 5000 INJECTION INTRAVENOUS; SUBCUTANEOUS at 06:02

## 2021-01-01 RX ADMIN — POTASSIUM CHLORIDE 40 MEQ: 20 SOLUTION ORAL at 08:49

## 2021-01-01 RX ADMIN — GUAIFENESIN 600 MG: 600 TABLET, EXTENDED RELEASE ORAL at 11:19

## 2021-01-01 RX ADMIN — DOCUSATE SODIUM 100 MG: 100 CAPSULE ORAL at 17:03

## 2021-01-01 RX ADMIN — VANCOMYCIN HYDROCHLORIDE 750 MG: 750 INJECTION, SOLUTION INTRAVENOUS at 15:46

## 2021-01-01 RX ADMIN — LEVALBUTEROL HYDROCHLORIDE 1.25 MG: 1.25 SOLUTION, CONCENTRATE RESPIRATORY (INHALATION) at 19:40

## 2021-01-01 RX ADMIN — ACETAMINOPHEN 975 MG: 325 TABLET, FILM COATED ORAL at 06:37

## 2021-01-01 RX ADMIN — LEVALBUTEROL HYDROCHLORIDE 1.25 MG: 1.25 SOLUTION, CONCENTRATE RESPIRATORY (INHALATION) at 19:44

## 2021-01-01 RX ADMIN — HEPARIN SODIUM 5000 UNITS: 5000 INJECTION INTRAVENOUS; SUBCUTANEOUS at 21:58

## 2021-01-01 RX ADMIN — HEPARIN SODIUM 5000 UNITS: 5000 INJECTION INTRAVENOUS; SUBCUTANEOUS at 06:03

## 2021-01-01 RX ADMIN — ISODIUM CHLORIDE 3 ML: 0.03 SOLUTION RESPIRATORY (INHALATION) at 07:19

## 2021-01-01 RX ADMIN — PIPERACILLIN AND TAZOBACTAM 3.38 G: 36; 4.5 INJECTION, POWDER, FOR SOLUTION INTRAVENOUS at 06:04

## 2021-01-01 RX ADMIN — PIPERACILLIN AND TAZOBACTAM 3.38 G: 36; 4.5 INJECTION, POWDER, FOR SOLUTION INTRAVENOUS at 05:41

## 2021-01-01 RX ADMIN — PIPERACILLIN AND TAZOBACTAM 3.38 G: 36; 4.5 INJECTION, POWDER, FOR SOLUTION INTRAVENOUS at 23:54

## 2021-01-01 RX ADMIN — Medication 250 MG: at 09:43

## 2021-01-01 RX ADMIN — MORPHINE SULFATE 2 MG: 2 INJECTION, SOLUTION INTRAMUSCULAR; INTRAVENOUS at 01:29

## 2021-11-16 PROBLEM — I35.0 AORTIC STENOSIS: Status: ACTIVE | Noted: 2021-01-01
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
## 2021-11-16 PROBLEM — S72.002A CLOSED LEFT HIP FRACTURE (HCC): Status: ACTIVE | Noted: 2021-01-01

## 2021-11-16 PROBLEM — F03.90 DEMENTIA (HCC): Status: ACTIVE | Noted: 2021-01-01
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
## 2021-11-17 PROBLEM — D72.829 LEUKOCYTOSIS: Status: ACTIVE | Noted: 2021-01-01

## 2021-11-19 PROBLEM — D62 ACUTE BLOOD LOSS ANEMIA: Status: ACTIVE | Noted: 2019-02-02

## 2021-11-19 PROBLEM — N17.9 ACUTE KIDNEY INJURY (HCC): Status: ACTIVE | Noted: 2021-01-01

## 2021-11-19 PROBLEM — R29.90 STROKE-LIKE SYMPTOMS: Status: ACTIVE | Noted: 2021-01-01

## 2021-11-20 PROBLEM — I63.9 ACUTE CVA (CEREBROVASCULAR ACCIDENT) (HCC): Status: ACTIVE | Noted: 2021-01-01

## 2021-11-20 PROBLEM — B35.1 ONYCHOMYCOSIS: Status: ACTIVE | Noted: 2021-01-01

## 2021-11-20 PROBLEM — D50.9 IRON DEFICIENCY ANEMIA: Status: ACTIVE | Noted: 2021-01-01

## 2021-11-21 PROBLEM — N17.9 ACUTE KIDNEY INJURY (HCC): Status: RESOLVED | Noted: 2021-01-01 | Resolved: 2021-01-01

## 2021-12-04 PROBLEM — A41.9 SEPSIS (HCC): Status: ACTIVE | Noted: 2021-01-01

## 2021-12-04 PROBLEM — D64.9 ACUTE ON CHRONIC ANEMIA: Status: ACTIVE | Noted: 2021-01-01

## 2021-12-05 PROBLEM — J18.9 RIGHT MIDDLE LOBE PNEUMONIA: Status: ACTIVE | Noted: 2021-01-01

## 2021-12-06 PROBLEM — E87.0 HYPERNATREMIA: Status: ACTIVE | Noted: 2021-01-01

## 2021-12-08 PROBLEM — R13.10 DYSPHAGIA: Status: ACTIVE | Noted: 2021-01-01

## 2021-12-12 PROBLEM — J96.01 ACUTE RESPIRATORY FAILURE WITH HYPOXIA (HCC): Status: RESOLVED | Noted: 2019-02-03 | Resolved: 2021-01-01

## 2021-12-21 NOTE — Clinical Note
Case was discussed with SHADI Jackman and the patient's admission status was agreed to be Admission Status: inpatient status to the service of

## 2021-12-22 PROBLEM — I10 HYPERTENSION: Chronic | Status: ACTIVE | Noted: 2017-04-16

## 2021-12-22 PROBLEM — R65.20 SEVERE SEPSIS (HCC): Status: ACTIVE | Noted: 2021-01-01

## 2021-12-22 PROBLEM — J18.9 PNEUMONIA: Status: ACTIVE | Noted: 2019-02-01

## 2021-12-22 PROBLEM — F03.90 DEMENTIA (HCC): Chronic | Status: ACTIVE | Noted: 2021-01-01

## 2021-12-22 PROBLEM — I25.10 CAD (CORONARY ARTERY DISEASE): Chronic | Status: ACTIVE | Noted: 2017-04-17

## 2021-12-22 PROBLEM — G92.8 TOXIC METABOLIC ENCEPHALOPATHY: Status: ACTIVE | Noted: 2021-01-01

## 2021-12-22 PROBLEM — R13.10 DYSPHAGIA: Chronic | Status: ACTIVE | Noted: 2021-01-01

## 2021-12-22 PROBLEM — E78.5 DYSLIPIDEMIA: Chronic | Status: ACTIVE | Noted: 2017-04-16

## 2021-12-22 PROBLEM — S72.002A CLOSED LEFT HIP FRACTURE (HCC): Chronic | Status: ACTIVE | Noted: 2021-01-01

## 2021-12-22 PROBLEM — I35.0 AORTIC STENOSIS: Chronic | Status: ACTIVE | Noted: 2021-01-01

## 2021-12-22 PROBLEM — I48.91 NEW ONSET A-FIB (HCC): Status: ACTIVE | Noted: 2021-01-01

## 2021-12-22 NOTE — NUTRITION
12/22/21 1133   Assessment   Timepoint Initial  (wounds)   Labs & Meds   Labs/Meds Review Lab values reviewed; Meds reviewed  (K+ 3 3, creat 1 37)   Feeding Route   PO NPO   Adequacy of Intake   Nutrition Modality NPO   Estimated calorie intake compared to estimated need Pt appears well nourished   Nutrition Prognosis   Nutrition Concerns Other (Comment)  (stg 1 and 2 pressure ulcers, +2 b/l LE)   Comorbid Concerns Other (Comment)  (ARF, pneumonia, a-fib, sepsis, aspiration pneumonia, dementia, dysphagia)   Nutrition Considerations Nutrition Educ not indicated at this time;Pt/Parents not appropriate for educ  at this time   PES Statement   Problem Intake   Oral or Nutritional Support Intake (2) Inadequate oral intake NI-2 1   Related to Other (Comment)  (aspiration pneumonia)   As evidenced by: NPO status   Patient Nutrition Goals   Goal Nutrition via appropriate route   Goal Status initiated   Timeframe to complete goal by next f/u   Recommendations/Interventions   Summary Pt admitted to hospital r/t aspiration pneumonia  Pt with known dysphagia  Currently NPO  Suggest ST eval   Wt is acceptable at [facility]  Some temple wasting noted  Clavicle not protruding  If unable to initiate diet, suggest peg tube insert and begin jevity 1 2@ 20 ml/hr to goal 50 ml/hr w/ 80 ml q 4hr  = 1440 kcal, 66 6 gm, 968 ml free water and 480 ml H2O via flush  Monitor daily wts   Malnutrition/BMI Present No  (some temple wasting presnt  No wt loss, unable to assess intake PTA)   Interventions Diet: to Advance; Initiate Daily Weight  (see summary for detail)   Nutrition Complexity Risk   Nutrition complexity level High risk   Follow up date 12/24/21

## 2021-12-22 NOTE — H&P
[address]  H&P- [place] Brought 12/29/1922, 80 y o  female MRN: [id number]  Unit/Bed#: ED 10 Encounter: [id number]  Primary Care Provider: Ofe Staples DO   Date and time admitted to hospital: 12/21/2021  8:51 PM    * Acute respiratory failure with hypoxia [facility])  Assessment & Plan  · Patient was noted to be in severe respiratory distress on admission with O2 sat 83% on 100% non-rebreather  · Placed on BiPAP on admission, was unable to tolerate BiPAP settings was therefore placed on CPAP 10 with 100% FiO2, patient oxygenating well on this currently   · CXR with dense infiltrates in RML/RLL and hazy opacification of LLL  · Check ABG   · Titrate fio2 for O2 sat greater than 90%  · Will trial HFNC if no hypercapnia present on ABG     Pneumonia  Assessment & Plan  · HCAP/aspiration pneumonia  · Patient was reportedly being treated for aspiration pneumonia with IV cefepime at [facility], unknown when treatment started  · Patient with oropharyngeal dysphagia post CVA in November with recent admission 12/4-12/12 for aspiration pneumonia/acute respiratory failure with hypoxia  · Patient received meropenem and vanco in the ED secondary to presumed cefepime failure  · Will continue antibiotic therapy with Zosyn and vanco  · Check strep pneumo and Legionella urine antigen  · Check MRSA nasal surveillance  · Likely will be unable to obtain sputum culture secondary to encephalopathy  · Trend procalcitonin    Severe sepsis ([address] )  Assessment & Plan  · POA as evidenced by tachycardia, tachypnea, and leukocytosis with bandemia   Lactic acid 3 4   · Source HCAP/aspiration pneumonia  · Did not receive IV fluids, will give 500 mL isolyte bolus now and monitor response  · Start Isolyte 100mL/hr  · BC x 2 pending  · Check procalcitonin  · Trend lactic q 2 hours until cleared  · Trend times and WBC  · Check UA C&S  · Check strep pneumo and Legionella urine antigens    New onset a-fib [facility])  Assessment & Plan  · AFib with rates 115 to 130s  · No prior history  · Likely induced by hypoxia  · Jasmyn 2 Vasc score 8, start heparin gtt for anticoagulation  · Received 15 mg IV Cardizem in ED  · Monitor HR response to fluid bolus, if rate control still needed post bolus will utilize IV Metoprolol     Toxic metabolic encephalopathy  Assessment & Plan  · Does have hx of dementia, recent CVA, and now with acute illness  · Patient with difficult to interpret verbal responses  · Hx of left sided weakness d/t CVA  · Neuro checks q2h  · Delirium precautions; regulate sleep-wake cycle, environmental controls, daily in ICU    Dysphagia  Assessment & Plan  · Began post CVA 11/19  · 12/7 witnessed aspiration event  · VBS 12/9 with oropharyngeal dysphagia  · Discharged to facility on puree diet with honey thick liquids  · Was being treated for aspiration pneumonia at facility prior to admission  · Strict NPO status       Closed left hip fracture ([facility] [address] )  Assessment & Plan  · Left hip fracture after mechanical fall 11/16 s/p hemiarthroplasty 11/17 with Dr Margarito Wharton   · Maintain left knee immobilizer and hip precautions through Dec 29th (6 weeks post-op)  · Has been on Lovenox 40mg daily as outpatient (now on heparin gtt in setting of new onset Afib)  · Weight bearing as tolerated  · PT/OT     CVA (cerebral vascular accident) [facility])  Assessment & Plan  · Acute CVA 11/19 discovered after patient developed new onset left-sided weakness and facial droop  · MRI revealed small acute/early subacute right MCA distribution infarct in medial right temporal lobe  · ASA/Statin/Plavix on hold secondary to NPO status  · PT/OT         Peripheral artery disease ([facility] [address] )  Assessment & Plan  · ASA/Plavix on hold secondary to NPO status  · Neurovascular checks q4h    Aortic stenosis  Assessment & Plan  · Echo 11/2021 with mild AS, mild AI, preserved EF      Dementia (HCC)  Assessment & Plan  · Supportive care    CAD (coronary artery disease)  Assessment & Plan  · History of PCI  · Hold ASA, statin, and Plavix in the setting of NPO status    Dyslipidemia  Assessment & Plan  · Hold home atorvastatin secondary to NPO status    Hypertension  Assessment & Plan  · Hold home losartan secondary to NPO status, severe sepsis, YECENIA  · Patient currently normotensive    -----------------------------------------------------------------------------------------------------------  Chief Complaint: respiratory distress on presentation    History of Present Illness   HX and PE limited by: dementia, encephalopathy   Rafael Chacko is a 80-year-old female with PMH of HTN, HLD, CAD s/p PCI, mild AS, mild AI, dementia, PAD, recent left hip fracture after mechanical fall 11/16 s/p hemiarthroplasty 11/17, and recent CVA 11/19 who presented to [facility] ED via EMS last evening from [facility] secondary to worsening hypoxia and respiratory distress  Patient was noted to be in severe respiratory distress on admission with O2 sat 83% on 100% non-rebreather and a new onset AFib with heart rate 120s  Patient reportedly was being treated for aspiration pneumonia with IV cefepime at facility  Patient placed on BiPAP on admission, was unable to tolerate BiPAP settings was therefore placed on CPAP 10 with 100% FiO2  CXR with dense infiltrates in RML/RLL and hazy opacification of LLL  Patient met sepsis criteria with tachycardia, tachypnea, and leukocytosis with bandemia  Lactic acid 3 4  Patient given meropenem, vanco, and 15mg IV Cardizem in ED  Of note, patient has had 2 recent admissions 11/16-11/23 due to mechanical fall at home with left hip fracture requiring left hip hemiarthroplasty 02/37, complicated by acute blood loss anemia requiring transfusion of 1 unit PRBC and acute CVA 11/19 discovered after patient developed new onset left-sided weakness and facial droop-MRI revealed small acute/early subacute right MCA distribution infarct in medial right temporal lobe    Patient readmitted 12/4-12/12 with altered mental status and hypoxia and treated for aspiration pneumonia in setting of dysphagia, likely secondary to CVA  Barium swallow was performed which revealed moderate to severe oropharyngeal dysphagia  Patient was discharged back to facility on modified diet  Patient's son Balbina Crisostomo called by ED physician Dr Jb Kerr who confirmed Level 3 DNR status  He stated he does not want any aggressive measures done, but would like to see if patient improves in the next 24 hours with conservative treatment measures  If patient does not show improvement in that time frame would like to then move to comfort measures  Patient will be admitted to critical care service as level 1 step-down  History obtained from chart review  -------------------------------------------------------------------------------------------------------------  Dispo: Admit to Stepdown Level 1    Code Status: Level 3 - DNAR and DNI  --------------------------------------------------------------------------------------------------------------  Review of Systems   Unable to perform ROS: Mental status change       Review of systems was unable to be performed secondary to dementia/encephalopathy    Physical Exam  Constitutional:       Appearance: She is ill-appearing  HENT:      Head: Normocephalic and atraumatic  Eyes:      General: Lids are normal       Extraocular Movements: Extraocular movements intact  Conjunctiva/sclera: Conjunctivae normal    Neck:      Trachea: Trachea normal    Cardiovascular:      Rate and Rhythm: Tachycardia present  Rhythm irregularly irregular  Pulses: Normal pulses  Radial pulses are 2+ on the right side and 2+ on the left side  Dorsalis pedis pulses are 2+ on the right side and 2+ on the left side  Heart sounds: Normal heart sounds, S1 normal and S2 normal    Pulmonary:      Effort: Tachypnea and accessory muscle usage present        Breath sounds: Decreased breath sounds and rhonchi present  Abdominal:      General: Bowel sounds are normal       Palpations: Abdomen is soft  Musculoskeletal:      Cervical back: Full passive range of motion without pain, normal range of motion and neck supple  Right lower le+ Edema present  Left lower le+ Edema present  Comments: Left knee immobilizer in place   Skin:     General: Skin is warm and dry  Capillary Refill: Capillary refill takes less than 2 seconds  Coloration: Skin is pale  Neurological:      Mental Status: She is lethargic  GCS: GCS eye subscore is 3  GCS verbal subscore is 3  GCS motor subscore is 6  Psychiatric:         Cognition and Memory: Cognition is impaired  Memory is impaired  --------------------------------------------------------------------------------------------------------------  Vitals:   Vitals:    21 0145 21 0215 21 0231 21 0307   BP:   133/56    BP Location:   Right arm    Pulse: (!) 114 (!) 120 (!) 106 (!) 112   Resp: (!) 26 (!) 26 (!) 24 21   Temp:       TempSrc:       SpO2: (!) 84% 100% 100% 100%   Weight:       Height:         Temp  Min: 97 5 °F (36 4 °C)  Max: 97 5 °F (36 4 °C)     Height: 5' 3" (160 cm)  Body mass index is 22 5 kg/m²      Laboratory and Diagnostics:  Results from last 7 days   Lab Units 21  2111   WBC Thousand/uL 35 82*   HEMOGLOBIN g/dL 11 8   HEMATOCRIT % 38 9   PLATELETS Thousands/uL 274   BANDS PCT % 7   MONO PCT % 14*     Results from last 7 days   Lab Units 21  2111   SODIUM mmol/L 137   POTASSIUM mmol/L 3 4*   CHLORIDE mmol/L 101   CO2 mmol/L 26   ANION GAP mmol/L 10   BUN mg/dL 19   CREATININE mg/dL 1 28   CALCIUM mg/dL 8 2*   GLUCOSE RANDOM mg/dL 160*   ALT U/L 22   AST U/L 37   ALK PHOS U/L 99   ALBUMIN g/dL 2 0*   TOTAL BILIRUBIN mg/dL 0 46     Results from last 7 days   Lab Units 211   MAGNESIUM mg/dL 1 7      Results from last 7 days   Lab Units 21  2111   INR 1 16   PTT seconds 31          Results from last 7 days   Lab Units 12/22/21  0126 12/21/21  2111   LACTIC ACID mmol/L 4 3* 3 4*     ABG:  Results from last 7 days   Lab Units 12/22/21  0201   PH ART  7 468*   PCO2 ART mm Hg 27 9*   PO2 ART mm Hg 119 2   HCO3 ART mmol/L 19 8*   BASE EXC ART mmol/L -2 8   ABG SOURCE  Radial, Right     VBG:  Results from last 7 days   Lab Units 12/22/21  0201   ABG SOURCE  Radial, Right           Micro:        EKG: Afib-rate 113  Imaging: CXR-dense infiltrates in RML/RLL and hazy opacification of LLL  I have personally reviewed pertinent films in PACS      Historical Information   Past Medical History:   Diagnosis Date    Abnormal EKG     Aortic valve stenosis     Arthritis     CKD (chronic kidney disease), stage III ([facility] [address]     Coronary artery disease     emergent PCI 11/18/13    Hypertension     Mitral and aortic regurgitation     Myocardial infarction ([facility] [address] ) 11/2013    with 1 stent     Past Surgical History:   Procedure Laterality Date    APPENDECTOMY      BILATERAL OOPHORECTOMY      CORONARY STENT PLACEMENT  2013    after MI (x1 stent)    DILATION AND CURETTAGE OF UTERUS      ESOPHAGUS FOREIGN BODY REMOVAL N/A 1/31/2019    Procedure: REMOVAL FOREIGN BODY ESOPHAGUS;  Surgeon: Joesph Nguyen MD;  Location: [place] [address] GI LAB; Service: Gastroenterology    JOINT REPLACEMENT Left     knee    DE PARTIAL HIP REPLACEMENT Left 11/17/2021    Procedure: HEMIARTHROPLASTY HIP (BIPOLAR); Surgeon: Gera Avendaño DO;  Location: [address] [place] [address];  Service: Orthopedics    DE XCAPSL CTRC RMVL INSJ IO LENS PROSTH W/O ECP Left 12/19/2016    Procedure: EXTRACTION EXTRACAPSULAR CATARACT PHACO INTRAOCULAR LENS (IOL); Surgeon: Diamond Mercedes MD;  Location: [facility] OR;  Service: Ophthalmology    DE XCAPSL CTRC RMVL INSJ IO LENS PROSTH W/O ECP Right 6/18/2018    Procedure: EXTRACTION EXTRACAPSULAR CATARACT PHACO INTRAOCULAR LENS (IOL);   Surgeon: Diamond Mercedes MD;  Location: [facility] OR; Service: Ophthalmology    TONSILLECTOMY       Social History   Social History     Substance and Sexual Activity   Alcohol Use Yes    Comment: drinks whiskey 2 x week     Social History     Substance and Sexual Activity   Drug Use No     Social History     Tobacco Use   Smoking Status Never Smoker   Smokeless Tobacco Never Used     Exercise History: unknown   Family History:   Family History   Problem Relation Age of Onset    Heart disease Sister         enlarged heart     I have reviewed this patient's family history and commented on sigificant items within the HPI      Medications:  Current Facility-Administered Medications   Medication Dose Route Frequency    acetaminophen (TYLENOL) rectal suppository 650 mg  650 mg Rectal Q6H PRN    heparin (porcine) 25,000 units in 0 45% NaCl 250 mL infusion (premix)  3-20 Units/kg/hr (Order-Specific) Intravenous Titrated    multi-electrolyte (ISOLYTE-S PH 7 4) bolus 1,300 mL  1,300 mL Intravenous Once    multi-electrolyte (ISOLYTE-S PH 7 4) bolus 500 mL  500 mL Intravenous Once    multi-electrolyte (PLASMALYTE-A/ISOLYTE-S PH 7 4) IV solution  100 mL/hr Intravenous Continuous    piperacillin-tazobactam (ZOSYN) IVPB 3 375 g  3 375 g Intravenous Q6H    potassium chloride 20 mEq IVPB (premix)  20 mEq Intravenous Once    vancomycin (VANCOCIN) IVPB (premix in dextrose) 750 mg 150 mL  15 mg/kg Intravenous Daily PRN     Home medications:  Prior to Admission Medications   Prescriptions Last Dose Informant Patient Reported?  Taking?   acetaminophen (TYLENOL) 325 mg tablet   No No   Sig: Take 3 tablets (975 mg total) by mouth every 8 (eight) hours   ascorbic acid (VITAMIN C) 250 MG tablet   No No   Sig: Take 1 tablet (250 mg total) by mouth daily   aspirin 81 MG tablet  Self Yes No   Sig: Take 81 mg by mouth daily     atorvastatin (LIPITOR) 80 mg tablet   No No   Sig: Take 1 tablet (80 mg total) by mouth daily with dinner   clopidogrel (Plavix) 75 mg tablet   No No   Sig: Take 1 tablet (75 mg total) by mouth daily   docusate sodium (COLACE) 100 mg capsule   No No   Sig: Take 1 capsule (100 mg total) by mouth 2 (two) times a day   enoxaparin (LOVENOX) 40 mg/0 4 mL   No No   Sig: Inject 0 4 mL (40 mg total) under the skin daily   ferrous sulfate 325 (65 Fe) mg tablet   No No   Sig: Take 1 tablet (325 mg total) by mouth every other day   folic acid (FOLVITE) 1 mg tablet   No No   Sig: Take 1 tablet (1 mg total) by mouth daily   losartan (COZAAR) 100 MG tablet   Yes No   Sig: Take 50 mg by mouth daily   pantoprazole (PROTONIX) 40 mg tablet   No No   Sig: Take 1 tablet (40 mg total) by mouth daily in the early morning   polyethylene glycol (MIRALAX) 17 g packet   No No   Sig: Take 17 g by mouth daily as needed (Constipation)      Facility-Administered Medications: None     Allergies: Allergies   Allergen Reactions    Metoprolol Other (See Comments)     According to previous medical record, you developed bradycardia and pre-syncope in 2015 while on metoprolol ER 25mg, so please DO NOT take metoprolol or other beta blocker  Anticipated Length of Stay is > 2 midnights    Care Time Delivered:   Upon my evaluation, this patient had a high probability of imminent or life-threatening deterioration due to severe sepsis, acute respiratory failure with hypoxia, pneumonia, which required my direct attention, intervention, and personal management  I have personally provided 38 minutes (0045 to 0145) of critical care time, exclusive of procedures, teaching, family meetings, and any prior time recorded by providers other than myself  TAN Butterfield        Portions of the record may have been created with voice recognition software  Occasional wrong word or "sound a like" substitutions may have occurred due to the inherent limitations of voice recognition software    Read the chart carefully and recognize, using context, where substitutions have occurred

## 2021-12-22 NOTE — ASSESSMENT & PLAN NOTE
· HCAP/aspiration pneumonia  · Patient was reportedly being treated for aspiration pneumonia with IV cefepime at [facility], unknown when treatment started  · Patient with oropharyngeal dysphagia post CVA in November with recent admission 12/4-12/12 for aspiration pneumonia/acute respiratory failure with hypoxia  · Patient received meropenem and vanco in the ED secondary to presumed cefepime failure  · Will continue antibiotic therapy with Zosyn and vanco  · Check strep pneumo and Legionella urine antigen  · Check MRSA nasal surveillance  · Likely will be unable to obtain sputum culture secondary to encephalopathy  · Trend procalcitonin

## 2021-12-22 NOTE — PHYSICAL THERAPY NOTE
PHYSICAL THERAPY EVALUATION/TREATMENT     12/22/21 3411   Note Type   Note type Evaluation   Pain Assessment   Pain Assessment Tool 0-10   Pain Score No Pain   Restrictions/Precautions   LLE Weight Bearing Per Order WBAT  (hip flexion to 60 degrees, no adduction no internal rotation)   Other Precautions Cognitive; Chair Alarm; Bed Alarm   Home Living   Type of Home SNF   Additional Comments Patient is a poor historian unable to offer reliable functional information   Prior Function   Level of [place] Needs assistance with ADLs and functional mobility   Lives With Facility staff   Receives Help From Personal care attendant   ADL Assistance Needs assistance   IADLs Needs assistance   Comments Patient admitted from short-term rehab with recent hip fracture and status post hemiarthroplasty 11/2021   General   Additional Pertinent History Chart reviewed, patient admitted with acute respiratory failure with hypoxia    Patient from short-term rehab with recent admissions and multiple medical issues prior to admission   Family/Caregiver Present No   Cognition   Overall Cognitive Status Impaired   Arousal/Participation Cooperative   Attention Attends with cues to redirect   Orientation Level Oriented to person;Oriented to place   Following Commands Follows one step commands with increased time or repetition   Subjective   Subjective No pain   RLE Assessment   RLE Assessment   (ROM WFL, strength 3-/ 5(hip flexion to 60 degrees per precau)   LLE Assessment   LLE Assessment   (ROM WFL, strength 3- /5)   Coordination   Movements are Fluid and Coordinated 0   Bed Mobility   Rolling R 2  Maximal assistance   Rolling L 2  Maximal assistance   Additional Comments Unable to assess sitting and out of bed at this time due to medical status as well as incontinence of bowel   Activity Tolerance   Activity Tolerance Patient limited by fatigue;Treatment limited secondary to medical complications (Comment)  (Seen in ICU with multiple lines)   Nurse Made Aware Yes   Assessment   Prognosis Good   Problem List Decreased strength;Decreased range of motion;Decreased endurance; Impaired balance;Decreased mobility; Decreased coordination;Decreased cognition; Impaired judgement;Decreased safety awareness   Goals   Patient Goals None stated patient confused at times   STG Expiration Date 12/29/21   Short Term Goal #1 Bed mobility with Min assist   Short Term Goal #2 Sitting balance to fair, assess standing and transfers   LTG Expiration Date 01/05/22   Long Term Goal #1 Transfers and gait with roller walker with mod assist   Long Term Goal #2 Gait endurance to 10-15 feet   Plan   Treatment/Interventions ADL retraining;Functional transfer training;LE strengthening/ROM; Therapeutic exercise; Endurance training;Cognitive reorientation;Patient/family training;Equipment eval/education; Bed mobility;Gait training; Compensatory technique education   PT Frequency Other (Comment)  (5 times per week)   Recommendation   PT Discharge Recommendation Post acute rehabilitation services   AM-PAC Basic Mobility Inpatient   Turning in Bed Without Bedrails 1   Lying on Back to Sitting on Edge of Flat Bed 1   Moving Bed to Chair 1   Standing Up From Chair 1   Walk in Room 1   Climb 3-5 Stairs 1   Basic Mobility Inpatient Raw Score 6   Turning Head Towards Sound 3   Follow Simple Instructions 3   Low Function Basic Mobility Raw Score 12   Low Function Basic Mobility Standardized Score 18 33   Highest Level Of Mobility   JH-HLM Goal 2: Bed activities/Dependent transfer   JH-HLM Highest Level of Mobility 2: Bed activities/Dependent transfer   JH-HLM Goal Achieved Yes   Barthel Index   Feeding 5   Bathing 0   Grooming Score 0   Dressing Score 0   Bladder Score 0   Bowels Score 0   Toilet Use Score 0   Transfers (Bed/Chair) Score 0   Mobility (Level Surface) Score 0   Stairs Score 0   Barthel Index Score 5   Additional Treatment Session   Start Time 1440   End Time 1453 Treatment Assessment S:  No pain reported O: Bilateral lower extremity active assistive ROM exercise completed a:  Patient will benefit from continued physical therapy with progression as tolerated as medically able   Exercises   Hamstring Stretch Supine;5 reps;PROM; Bilateral   Hip Abduction Supine;5 reps;AAROM; Bilateral   Knee AROM Short Arc Quad Supine;5 reps;AAROM; Bilateral   Ankle Pumps Supine;5 reps;AROM; Bilateral   Licensure   [place] License Number  Samantha Solis PT [id number]

## 2021-12-22 NOTE — ASSESSMENT & PLAN NOTE
· Hold home losartan secondary to NPO status, severe sepsis, YECENIA  · Patient currently normotensive

## 2021-12-22 NOTE — ASSESSMENT & PLAN NOTE
· Acute CVA 11/19 discovered after patient developed new onset left-sided weakness and facial droop  · MRI revealed small acute/early subacute right MCA distribution infarct in medial right temporal lobe  · ASA/Statin/Plavix on hold secondary to NPO status  · PT/OT

## 2021-12-22 NOTE — ASSESSMENT & PLAN NOTE
· Patient was noted to be in severe respiratory distress on admission with O2 sat 83% on 100% non-rebreather  · Placed on BiPAP on admission, was unable to tolerate BiPAP settings was therefore placed on CPAP 10 with 100% FiO2, patient oxygenating well on this currently   · CXR with dense infiltrates in RML/RLL and hazy opacification of LLL  · Check ABG   · Titrate fio2 for O2 sat greater than 90%  · Will trial HFNC if no hypercapnia present on ABG

## 2021-12-22 NOTE — ASSESSMENT & PLAN NOTE
· Left hip fracture after mechanical fall 11/16 s/p hemiarthroplasty 11/17 with Dr Jazz Hebert   · Maintain left knee immobilizer and hip precautions through Dec 29th (6 weeks post-op)  · Has been on Lovenox 40mg daily as outpatient (now on heparin gtt in setting of new onset Afib)  · Weight bearing as tolerated  · PT/OT

## 2021-12-22 NOTE — OCCUPATIONAL THERAPY NOTE
Occupational Therapy Evaluation       12/22/21 1510   Note Type   Note type Evaluation   Restrictions/Precautions   Other Precautions Cognitive; Chair Alarm; Bed Alarm;Multiple lines; Fall Risk  (no hip flexion >60 degrees, no hip adduction, no hip IR)   Pain Assessment   Pain Assessment Tool Bee-Baker FACES   Bee-Baker FACES Pain Rating 0   Home Living   Type of Home SNF   Additional Comments Patient presented from STR for hypoxia   Prior Function   Level of [place] Needs assistance with ADLs and functional mobility   Lives With Facility staff   Receives Help From Personal care attendant   ADL Assistance Needs assistance   IADLs Needs assistance   Comments Patient most recently at [facility] receiving assist with ADLs/mobility ; patient with recent hip fracture s/p hemiarthroplasty 11/2021, then with dislocation and reduction, now with orthopedic precautions per last documentation (no hip flexion >60 degrees, no hip adduction, no hip IR)   ADL   Eating Assistance 4  Minimal Assistance   Grooming Assistance 4  Minimal Assistance   Grooming Deficit Wash/dry hands; Wash/dry face   UB Bathing Assistance 1  Total Assistance   LB Bathing Assistance 1  Total Assistance   [address] 1  Total Assistance   LB Dressing Assistance 1  Total [address] [place] [address]  1  Total Assistance   Bed Mobility   Rolling R 2  Maximal assistance   Additional items Assist x 2   Rolling L 2  Maximal assistance   Additional items Assist x 2   Additional Comments Patient unable to tolerate further mobility   Activity Tolerance   Activity Tolerance Patient limited by fatigue;Treatment limited secondary to medical complications (Comment)  (in ICU)   RUE Assessment   RUE Assessment WFL   LUE Assessment   LUE Assessment WFL   Cognition   Overall Cognitive Status Impaired   Arousal/Participation Alert; Cooperative  [facility])   Attention Attends with cues to redirect   Orientation Level Oriented to person;Oriented to place; Disoriented to time;Disoriented to situation   Following Commands Follows one step commands with increased time or repetition   Assessment   Limitation Decreased ADL status; Decreased UE strength;Decreased Safe judgement during ADL;Decreased cognition;Decreased endurance;Decreased self-care trans;Decreased high-level ADLs   Prognosis Good   Assessment Patient evaluated by Occupational Therapy  Patient admitted with Acute respiratory failure with hypoxia ([facility] [address] )  The patients occupational profile, medical and therapy history includes a extensive additional review of physical, cognitive, or psychosocial history related to current functional performance  Comorbidities affecting functional mobility and ADLS include: HTN, CAD, arthritis, MI, aortic valve stenosis, CKD, mitral regurgitation  Prior to admission, patient was requiring assist for functional mobility with RW, requiring assist for ADLS, requiring assist for IADLS and in short term rehab  The evaluation identifies the following performance deficits: weakness, impaired balance, decreased endurance, increased fall risk, new onset of impairment of functional mobility, decreased ADLS, decreased IADLS, decreased activity tolerance, decreased safety awareness, impaired judgement, ortheopedic restrictions, decreased cognition and decreased strength, that result in activity limitations and/or participation restrictions  This evaluation requires clinical decision making of high complexity, because the patient presents with comorbidites that affect occupational performance and required significant modification of tasks or assistance with consideration of multiple treatment options  The Barthel Index was used as a functional outcome tool presenting with a score of 5, indicating marked limitations of functional mobility and ADLS   The patient's raw score on the AM-PAC Daily Activity inpatient short form low function score is 15, standardized score is Low Function Daily Activity Standardized Score: 26 28  Patients with a standardized score less than 39 4 are likely to benefit from discharge to post-acute rehab services  Please refer to the recommendation of the Occupational Therapist for safe discharge planning  Patient will benefit from skilled Occupational Therapy services to address above deficits and facilitate a safe return to prior level of function  Goals   Patient Goals Unable to state due to impaired cognition   STG Time Frame   (1-7 days)   Short Term Goal  Patient will increase sitting tolerance to 5 minutes during ADL task to decrease assistance level and decrease fall risk; Patient will increase bed mobility to max assist of 2 in preparation for ADLS and transfers; Patient will increase functional mobility to and from bedside commode with rolling walker with max assist of 2 to increase performance with ADLS and to use a toilet; Patient will tolerate 5 minutes of UE ROM/strengthening to increase general activity tolerance and performance in ADLS/IADLS; Patient will improve functional activity tolerance to 5 minutes of sustained functional tasks to increase participation in basic self-care and decrease assistance level;  Patient will be able to to verbalize understanding and perform energy conservation/proper body mechanics during ADLS and functional mobility at least 25% of the time with maximal cueing to decrease signs of fatigue and increase stamina to return to prior level of function; Patient will increase static/dynamic sitting balance to poor+ to improve the ability to sit at edge of bed or on a chair for ADLS;  Patient will increase static/dynamic standing balance to poor+ to improve postural stability and decrease fall risk during standing ADLS and transfers     LTG Time Frame   (8-14 days)   Long Term Goal Patient will increase sitting tolerance to 10 minutes during ADL task to decrease assistance level and decrease fall risk; Patient will increase bed mobility to mod assist of 2 in preparation for ADLS and transfers; Patient will increase functional mobility to and from bedside commode with rolling walker with mod assist of 2 to increase performance with ADLS and to use a toilet; Patient will tolerate 10 minutes of UE ROM/strengthening to increase general activity tolerance and performance in ADLS/IADLS; Patient will improve functional activity tolerance to 10 minutes of sustained functional tasks to increase participation in basic self-care and decrease assistance level;  Patient will be able to to verbalize understanding and perform energy conservation/proper body mechanics during ADLS and functional mobility at least 50% of the time with moderate cueing to decrease signs of fatigue and increase stamina to return to prior level of function; Patient will increase static/dynamic sitting balance to fair- to improve the ability to sit at edge of bed or on a chair for ADLS;  Patient will increase static/dynamic standing balance to fair- to improve postural stability and decrease fall risk during standing ADLS and transfers  Pt will score >/= 14/24 on AM-PAC Daily Activity Inpatient scale to promote safe independence with ADLs and functional mobility; Pt will score >/= 35/100 on Barthel Index in order to decrease caregiver assistance needed and increase ability to perform ADLs and functional mobility     Functional Transfer Goals   Pt Will Perform All Functional Transfers   (STG max assist x 2, LTG mod assist x 2)   ADL Goals   Pt Will Perform Eating   (STG supervision, LTG independent)   Pt Will Perform Grooming   (STG supervision, LTG independent)   Pt Will Perform Bathing   (STG max assist, LTG mod assist)   Pt Will Perform UE Dressing   (STG max assist, LTG mod assist)   Pt Will Perform LE Dressing   (STG max assist, LTG mod assist)   Pt Will Perform Toileting   (STG max assist, LTG mod assist)   Plan   Treatment Interventions ADL retraining;Functional transfer training;UE strengthening/ROM; Endurance training;Patient/family training;Equipment evaluation/education; Compensatory technique education;Continued evaluation; Energy conservation; Activityengagement   Goal Expiration Date 01/05/22   OT Frequency 3-5x/wk   Recommendation   OT Discharge Recommendation Post acute rehabilitation services   AM-PAC Daily Activity Inpatient   Lower Body Dressing 1   Bathing 1   Toileting 1   Upper Body Dressing 1   Grooming 3   Eating 3   Daily Activity Raw Score 10   Turning Head Towards Sound 3   Follow Simple Instructions 2   Low Function Daily Activity Raw Score 15   Low Function Daily Activity Standardized Score 26 28   AM-PAC Applied Cognition Inpatient   Following a Speech/Presentation 2   Understanding Ordinary Conversation 3   Taking Medications 1   Remembering Where Things Are Placed or Put Away 2   Remembering List of 4-5 Errands 1   Taking Care of Complicated Tasks 1   Applied Cognition Raw Score 10   Applied Cognition Standardized Score 24 98   Barthel Index   Feeding 5   Bathing 0   Grooming Score 0   Dressing Score 0   Bladder Score 0   Bowels Score 0   Toilet Use Score 0   Transfers (Bed/Chair) Score 0   Mobility (Level Surface) Score 0   Stairs Score 0   Barthel Index Score 5   Licensure   [place] License Number  [id number], ALEX/RAMA [id number]

## 2021-12-22 NOTE — QUICK NOTE
I called the patient's son, Dr Amol Butt, to provide daily update  We reviewed the patient's improving O2 status, YECENIA with minimal UO and AF RVR on heparin gtt  We also discussed the possibility that aspiration pna may recur, and in that case, she may require eval for peg tube, however given her age, she may not be a candidate for such  Dr Amol Butt was very involved in the conversation, and did state that he does not believe that Ruby Palomo would want a feeding tube of any kind  For now, we will continue to wean her o2, monitor her UO and recheck creatinine this afternoon  He was appreciative of the update and had no further questions

## 2021-12-22 NOTE — PROGRESS NOTES
Vancomycin Assessment    Shannon Rodriguez is a 80 y o  female who is currently receiving vancomycin vancomycin 750mg q24h for Pneumonia     Relevant clinical data and objective history reviewed:  Creatinine   Date Value Ref Range Status   12/21/2021 1 28 0 60 - 1 30 mg/dL Final     Comment:     Standardized to IDMS reference method   12/12/2021 0 89 0 60 - 1 30 mg/dL Final     Comment:     Standardized to IDMS reference method   12/11/2021 0 96 0 60 - 1 30 mg/dL Final     Comment:     Standardized to IDMS reference method     /66 (BP Location: Right arm)   Pulse (!) 112   Temp 97 5 °F (36 4 °C) (Tympanic)   Resp (!) 28   Ht 5' 3" (1 6 m)   Wt 57 6 kg (127 lb)   SpO2 99%   BMI 22 50 kg/m²   No intake/output data recorded  Lab Results   Component Value Date/Time    BUN 19 12/21/2021 09:11 PM    WBC 35 82 (HH) 12/21/2021 09:11 PM    HGB 11 8 12/21/2021 09:11 PM    HCT 38 9 12/21/2021 09:11 PM     (H) 12/21/2021 09:11 PM     12/21/2021 09:11 PM     Temp Readings from Last 3 Encounters:   12/21/21 97 5 °F (36 4 °C) (Tympanic)   12/12/21 98 1 °F (36 7 °C)   12/01/21 (!) 97 4 °F (36 3 °C)     Vancomycin Days of Therapy: 1    Assessment/Plan  The patient is currently on vancomycin utilizing pulse dosing based on actual body weight  Baseline risks associated with therapy include: pre-existing renal impairment, advanced age, and dehydration  The patient is currently receiving vancomycin 750mg q24h and after clinical evaluation will be changed to vancomycin 750mg daily prn for random trough <20  [facility] also follow closely for s/sx of nephrotoxicity, infusion reactions, and appropriateness of therapy  BMP and CBC will be ordered per protocol  Plan for a random trough at approximately 2100 12/22  Due to infection severity, will target a trough of 15-20 (appropriate for most indications)   Pharmacy will continue to follow the patients culture results and clinical progress daily      Layton Lewis Ramila [place], Pharmacist

## 2021-12-22 NOTE — ED PROVIDER NOTES
History  Chief Complaint   Patient presents with    Decreased Oxygen Level     Patient comes via EMS from [facility] facility for low oxygen level with O2 devices not successful     Patient in the ER from [facility] for worsening hypoxia and resp distress  Pt was recently diagnosed with aspriaion pneumonia, started on a course of IV 7, however patient is worsening  She has a prior medical history of hypertension, hyperlipidemia, Patient is awake but in severe respiratory distress at time of initial evaluation  Patient is DNR and DNI and reviewed her code status with her children after my initial evaluation  History provided by:  EMS personnel  History limited by:  Severe respiratory distress   used: No    Shortness of Breath  Severity:  Moderate  Onset quality:  Gradual  Timing:  Constant  Progression:  Worsening  Chronicity:  New  Relieved by:  Nothing  Worsened by:  Nothing  Ineffective treatments:  None tried      Prior to Admission Medications   Prescriptions Last Dose Informant Patient Reported?  Taking?   acetaminophen (TYLENOL) 325 mg tablet 12/21/2021 at Unknown time  No Yes   Sig: Take 3 tablets (975 mg total) by mouth every 8 (eight) hours   ascorbic acid (VITAMIN C) 250 MG tablet 12/21/2021 at Unknown time  No Yes   Sig: Take 1 tablet (250 mg total) by mouth daily   aspirin 81 MG tablet 12/21/2021 at Unknown time Self Yes Yes   Sig: Take 81 mg by mouth daily     atorvastatin (LIPITOR) 80 mg tablet 12/20/2021  No No   Sig: Take 1 tablet (80 mg total) by mouth daily with dinner   clopidogrel (Plavix) 75 mg tablet   No No   Sig: Take 1 tablet (75 mg total) by mouth daily   docusate sodium (COLACE) 100 mg capsule 12/21/2021 at Unknown time  No Yes   Sig: Take 1 capsule (100 mg total) by mouth 2 (two) times a day   enoxaparin (LOVENOX) 40 mg/0 4 mL 12/21/2021 at Unknown time  No Yes   Sig: Inject 0 4 mL (40 mg total) under the skin daily   ferrous sulfate 325 (65 Fe) mg tablet 12/21/2021 at Unknown time  No Yes   Sig: Take 1 tablet (325 mg total) by mouth every other day   folic acid (FOLVITE) 1 mg tablet 12/21/2021 at Unknown time  No Yes   Sig: Take 1 tablet (1 mg total) by mouth daily   losartan (COZAAR) 100 MG tablet 12/21/2021 at Unknown time  Yes Yes   Sig: Take 50 mg by mouth daily   pantoprazole (PROTONIX) 40 mg tablet 12/21/2021 at Unknown time  No Yes   Sig: Take 1 tablet (40 mg total) by mouth daily in the early morning   polyethylene glycol (MIRALAX) 17 g packet   No No   Sig: Take 17 g by mouth daily as needed (Constipation)      Facility-Administered Medications: None       Past Medical History:   Diagnosis Date    Abnormal EKG     Aortic valve stenosis     Arthritis     CKD (chronic kidney disease), stage III ([facility] )     Coronary artery disease     emergent PCI 11/18/13    Hypertension     Mitral and aortic regurgitation     Myocardial infarction ([facility] ) 11/2013    with 1 stent       Past Surgical History:   Procedure Laterality Date    APPENDECTOMY      BILATERAL OOPHORECTOMY      CORONARY STENT PLACEMENT  2013    after MI (x1 stent)    DILATION AND CURETTAGE OF UTERUS      ESOPHAGUS FOREIGN BODY REMOVAL N/A 1/31/2019    Procedure: REMOVAL FOREIGN BODY ESOPHAGUS;  Surgeon: Juju Cardoza MD;  Location: [place] [address] GI LAB; Service: Gastroenterology    JOINT REPLACEMENT Left     knee    CO PARTIAL HIP REPLACEMENT Left 11/17/2021    Procedure: HEMIARTHROPLASTY HIP (BIPOLAR); Surgeon: Amie Merida DO;  Location: [address] [place] [address];  Service: Orthopedics    CO XCAPSL CTRC RMVL INSJ IO LENS PROSTH W/O ECP Left 12/19/2016    Procedure: EXTRACTION EXTRACAPSULAR CATARACT PHACO INTRAOCULAR LENS (IOL); Surgeon: Adiel Villa MD;  Location: [facility] MAIN OR;  Service: Ophthalmology    CO XCAPSL CTRC RMVL INSJ IO LENS PROSTH W/O ECP Right 6/18/2018    Procedure: EXTRACTION EXTRACAPSULAR CATARACT PHACO INTRAOCULAR LENS (IOL);   Surgeon: Adiel Villa MD;  Location: [facility] OR; Service: Ophthalmology    TONSILLECTOMY         Family History   Problem Relation Age of Onset    Heart disease Sister         enlarged heart     I have reviewed and agree with the history as documented  E-Cigarette/Vaping    E-Cigarette Use Never User      E-Cigarette/Vaping Substances    Nicotine No     THC No     CBD No     Flavoring No     Other No     Unknown No      Social History     Tobacco Use    Smoking status: Never Smoker    Smokeless tobacco: Never Used   Vaping Use    Vaping Use: Never used   Substance Use Topics    Alcohol use: Yes     Comment: drinks whiskey 2 x week    Drug use: No       Review of Systems   Unable to perform ROS: Severe respiratory distress   Respiratory: Positive for shortness of breath  All other systems reviewed and are negative  Physical Exam  Physical Exam  Vitals and nursing note reviewed  Constitutional:       General: She is not in acute distress  Appearance: She is well-developed  She is not diaphoretic  HENT:      Head: Normocephalic and atraumatic  Eyes:      Conjunctiva/sclera: Conjunctivae normal       Pupils: Pupils are equal, round, and reactive to light  Cardiovascular:      Rate and Rhythm: Normal rate and regular rhythm  Heart sounds: Normal heart sounds  No murmur heard  Pulmonary:      Effort: Respiratory distress present  Breath sounds: Rhonchi and rales present  Abdominal:      General: Bowel sounds are normal  There is no distension  Palpations: Abdomen is soft  Tenderness: There is no abdominal tenderness  Musculoskeletal:         General: No deformity  Normal range of motion  Cervical back: Normal range of motion and neck supple  Skin:     General: Skin is warm and dry  Coloration: Skin is not pale  Findings: No rash  Neurological:      Mental Status: She is alert  Cranial Nerves: No cranial nerve deficit     Psychiatric:         Behavior: Behavior normal          Vital Signs  ED Triage Vitals   Temperature Pulse Respirations Blood Pressure SpO2   12/21/21 2057 12/21/21 2057 12/21/21 2057 12/21/21 2057 12/21/21 2057   97 5 °F (36 4 °C) (!) 122 (!) 36 136/58 (!) 83 %      Temp Source Heart Rate Source Patient Position - Orthostatic VS BP Location FiO2 (%)   12/21/21 2057 12/21/21 2057 12/21/21 2057 12/21/21 2057 --   Tympanic Monitor Lying Right arm       Pain Score       12/22/21 0450       No Pain           Vitals:    12/23/21 1500 12/23/21 1937 12/23/21 2033 12/23/21 2327   BP: 153/70  125/69 134/68   Pulse: 91 90  89   Patient Position - Orthostatic VS: Lying   Lying         Visual Acuity  Visual Acuity      Most Recent Value   L Pupil Size (mm) 2   R Pupil Size (mm) 2   L Pupil Shape Round   R Pupil Shape Round          ED Medications  Medications   multi-electrolyte (PLASMALYTE-A/ISOLYTE-S PH 7 4) IV solution (100 mL/hr Intravenous New Bag 12/23/21 2146)   acetaminophen (TYLENOL) rectal suppository 650 mg (has no administration in time range)   piperacillin-tazobactam (ZOSYN) IVPB 3 375 g (3 375 g Intravenous New Bag 12/24/21 1211)   levalbuterol (XOPENEX) inhalation solution 1 25 mg (1 25 mg Nebulization Given 12/24/21 1320)   sodium chloride 0 9 % inhalation solution 3 mL (3 mL Nebulization Given 12/24/21 1320)   enoxaparin (LOVENOX) subcutaneous injection 40 mg (has no administration in time range)   diltiazem (CARDIZEM) injection 15 mg (15 mg Intravenous Given 12/21/21 2155)   vancomycin (VANCOCIN) IVPB (premix in dextrose) 750 mg 150 mL (0 mg/kg × 57 6 kg Intravenous Stopped 12/21/21 2348)   meropenem (MERREM) 1,000 mg in sodium chloride 0 9 % 100 mL IVPB (0 mg Intravenous Stopped 12/21/21 2303)   potassium chloride 20 mEq IVPB (premix) (0 mEq Intravenous Stopped 12/22/21 0030)     Followed by   potassium chloride 20 mEq IVPB (premix) (0 mEq Intravenous Stopped 12/22/21 0257)   magnesium sulfate 2 g/50 mL IVPB (premix) 2 g (0 g Intravenous Stopped 12/22/21 0228) multi-electrolyte (ISOLYTE-S PH 7 4) bolus 500 mL (0 mL Intravenous Stopped 12/22/21 0130)   multi-electrolyte (ISOLYTE-S PH 7 4) bolus 1,300 mL (0 mL Intravenous Stopped 12/22/21 0550)   multi-electrolyte (ISOLYTE-S PH 7 4) bolus 500 mL (0 mL Intravenous Stopped 12/22/21 0903)   potassium chloride 20 mEq IVPB (premix) (0 mEq Intravenous Stopped 12/22/21 2121)       Diagnostic Studies  Results Reviewed     Procedure Component Value Units Date/Time    Blood culture #1 [[id number]] Collected: 12/21/21 2111    Lab Status: Preliminary result Specimen: Blood from Arm, Right Updated: 12/24/21 1101     Blood Culture No Growth at 48 hrs  Blood culture #2 [[id number]] Collected: 12/21/21 2111    Lab Status: Preliminary result Specimen: Blood from Arm, Left Updated: 12/24/21 1101     Blood Culture No Growth at 48 hrs      MRSA culture [[id number]]  (Normal) Collected: 12/22/21 0530    Lab Status: Final result Specimen: Nares from Nose Updated: 12/23/21 1159     MRSA Culture Only No Methicillin Resistant Staphlyococcus aureus (MRSA) isolated    Vancomycin, random [[id number]] Collected: 12/22/21 2053    Lab Status: Final result Specimen: Blood from Arm, Right Updated: 12/22/21 2132     Vancomycin Rm 2 3 ug/mL     HS Troponin I 4hr [[id number]] Collected: 12/22/21 0530    Lab Status: Final result Specimen: Blood from Arm, Right Updated: 12/22/21 0601     hs TnI 4hr 40 ng/L      Delta 4hr hsTnI 24 ng/L     Magnesium [[id number]]  (Normal) Collected: 12/22/21 0530    Lab Status: Final result Specimen: Blood from Arm, Right Updated: 12/22/21 0556     Magnesium 2 1 mg/dL     Phosphorus [[id number]]  (Normal) Collected: 12/22/21 0530    Lab Status: Final result Specimen: Blood from Arm, Right Updated: 12/22/21 0556     Phosphorus 3 3 mg/dL     Basic metabolic panel [[id number]]  (Abnormal) Collected: 12/22/21 0530    Lab Status: Final result Specimen: Blood from Arm, Right Updated: 12/22/21 0556     Sodium 137 mmol/L      Potassium 3 3 mmol/L      Chloride 103 mmol/L      CO2 23 mmol/L      ANION GAP 11 mmol/L      BUN 20 mg/dL      Creatinine 1 37 mg/dL      Glucose 118 mg/dL      Calcium 7 5 mg/dL      eGFR 32 ml/min/1 73sq m     Narrative:      [place] guidelines for Chronic Kidney Disease (CKD):     Stage 1 with normal or high GFR (GFR > 90 mL/min/1 73 square meters)    Stage 2 Mild CKD (GFR = 60-89 mL/min/1 73 square meters)    Stage 3A Moderate CKD (GFR = 45-59 mL/min/1 73 square meters)    Stage 3B Moderate CKD (GFR = 30-44 mL/min/1 73 square meters)    Stage 4 Severe CKD (GFR = 15-29 mL/min/1 73 square meters)    Stage 5 End Stage CKD (GFR <15 mL/min/1 73 square meters)  Note: GFR calculation is accurate only with a steady state creatinine    APTT [[id number]]  (Abnormal) Collected: 12/22/21 0530    Lab Status: Final result Specimen: Blood from Arm, Right Updated: 12/22/21 0551     PTT 46 seconds     Protime-INR [[id number]]  (Abnormal) Collected: 12/22/21 0530    Lab Status: Final result Specimen: Blood from Arm, Right Updated: 12/22/21 0551     Protime 15 9 seconds      INR 1 30    CBC and differential [[id number]]  (Abnormal) Collected: 12/22/21 0530    Lab Status: Final result Specimen: Blood from Arm, Right Updated: 12/22/21 0540     WBC 35 69 Thousand/uL      RBC 3 07 Million/uL      Hemoglobin 9 6 g/dL      Hematocrit 30 2 %      MCV 98 fL      MCH 31 3 pg      MCHC 31 8 g/dL      RDW 16 8 %      MPV 10 1 fL      Platelets 819 Thousands/uL      nRBC 0 /100 WBCs      Neutrophils Relative 86 %      Immat GRANS % 3 %      Lymphocytes Relative 2 %      Monocytes Relative 9 %      Eosinophils Relative 0 %      Basophils Relative 0 %      Neutrophils Absolute 30 32 Thousands/µL      Immature Grans Absolute >0 50 Thousand/uL      Lymphocytes Absolute 0 85 Thousands/µL      Monocytes Absolute 3 36 Thousand/µL      Eosinophils Absolute 0 00 Thousand/µL      Basophils Absolute 0 08 Thousands/µL     Narrative: This is an appended report  These results have been appended to a previously verified report  Lactic acid, plasma [[id number]]  (Abnormal) Collected: 12/22/21 0358    Lab Status: Final result Specimen: Blood from Arm, Left Updated: 12/22/21 0504     LACTIC ACID 4 4 mmol/L     Narrative:      Result may be elevated if tourniquet was used during collection  Lactic acid 2 Hours [[id number]]  (Abnormal) Collected: 12/22/21 0126    Lab Status: Final result Specimen: Blood from Arm, Left Updated: 12/22/21 0307     LACTIC ACID 4 3 mmol/L     Narrative:      Result may be elevated if tourniquet was used during collection      HS Troponin I 2hr [[id number]] Collected: 12/22/21 0127    Lab Status: Final result Specimen: Blood from Arm, Left Updated: 12/22/21 0302     hs TnI 2hr 23 ng/L      Delta 2hr hsTnI 7 ng/L     Blood gas, arterial [[id number]]  (Abnormal) Collected: 12/22/21 0201    Lab Status: Final result Specimen: Blood, Arterial from Radial, Right Updated: 12/22/21 0213     pH, Arterial 7 468     PH ART TC 7 477     pCO2, Arterial 27 9 mm Hg      PCO2 (TC) Arterial 27 2 mm Hg      pO2, Arterial 119 2 mm Hg      PO2 (TC) Arterial 115 5 mm Hg      HCO3, Arterial 19 8 mmol/L      Base Excess, Arterial -2 8 mmol/L      O2 Content, Arterial 16 3 mL/dL      O2 HGB,Arterial  98 2 %      SOURCE Radial, Right     KERI TEST Yes     Temperature 97 5 Degrees Fehrenheit     UA w Reflex to Microscopic w Reflex to Culture [[id number]]     Lab Status: No result Specimen: Urine, Clean Catch     Strep Pneumoniae, Urine [[id number]]     Lab Status: No result Specimen: Urine, Clean Catch     Legionella antigen, urine [[id number]]     Lab Status: No result Specimen: Urine, Clean Catch     Magnesium [[id number]]  (Normal) Collected: 12/21/21 2111    Lab Status: Final result Specimen: Blood from Arm, Left Updated: 12/21/21 2318     Magnesium 1 7 mg/dL     COVID/FLU/RSV - 2 hour TAT [[id number]]  (Normal) Collected: 12/21/21 2111    Lab Status: Final result Specimen: Nares from Nose Updated: 12/21/21 2220     SARS-CoV-2 Negative     INFLUENZA A PCR Negative     INFLUENZA B PCR Negative     RSV PCR Negative    Narrative:      FOR PEDIATRIC PATIENTS - copy/paste COVID Guidelines URL to browser: [email or web address]  [email or web address]     This test has been authorized by FDA under an EUA (Emergency Use Assay) for use by authorized laboratories  Clinical caution and judgement should be used with the interpretation of these results with consideration of the clinical impression and other laboratory testing  Testing reported as "Positive" or "Negative" has been proven to be accurate according to standard laboratory validation requirements  All testing is performed with control materials showing appropriate reactivity at standard intervals  Lactic acid [[id number]]  (Abnormal) Collected: 12/21/21 2111    Lab Status: Final result Specimen: Blood from Arm, Left Updated: 12/21/21 2159     LACTIC ACID 3 4 mmol/L     Narrative:      Result may be elevated if tourniquet was used during collection      HS Troponin 0hr (reflex protocol) [[id number]]  (Normal) Collected: 12/21/21 2111    Lab Status: Final result Specimen: Blood from Arm, Left Updated: 12/21/21 2155     hs TnI 0hr 16 ng/L     Comprehensive metabolic panel [[id number]]  (Abnormal) Collected: 12/21/21 2111    Lab Status: Final result Specimen: Blood from Arm, Left Updated: 12/21/21 2155     Sodium 137 mmol/L      Potassium 3 4 mmol/L      Chloride 101 mmol/L      CO2 26 mmol/L      ANION GAP 10 mmol/L      BUN 19 mg/dL      Creatinine 1 28 mg/dL      Glucose 160 mg/dL      Calcium 8 2 mg/dL      Corrected Calcium 9 8 mg/dL      AST 37 U/L      ALT 22 U/L      Alkaline Phosphatase 99 U/L      Total Protein 5 9 g/dL      Albumin 2 0 g/dL      Total Bilirubin 0 46 mg/dL      eGFR 34 ml/min/1 73sq m     Narrative:      [place] guidelines for Chronic Kidney Disease (CKD):     Stage 1 with normal or high GFR (GFR > 90 mL/min/1 73 square meters)    Stage 2 Mild CKD (GFR = 60-89 mL/min/1 73 square meters)    Stage 3A Moderate CKD (GFR = 45-59 mL/min/1 73 square meters)    Stage 3B Moderate CKD (GFR = 30-44 mL/min/1 73 square meters)    Stage 4 Severe CKD (GFR = 15-29 mL/min/1 73 square meters)    Stage 5 End Stage CKD (GFR <15 mL/min/1 73 square meters)  Note: GFR calculation is accurate only with a steady state creatinine    NT-BNP PRO [[id number]]  (Abnormal) Collected: 12/21/21 2111    Lab Status: Final result Specimen: Blood from Arm, Left Updated: 12/21/21 2155     NT-proBNP 3,249 pg/mL     CBC and differential [[id number]]  (Abnormal) Collected: 12/21/21 2111    Lab Status: Final result Specimen: Blood from Arm, Left Updated: 12/21/21 2149     WBC 35 82 Thousand/uL      RBC 3 91 Million/uL      Hemoglobin 11 8 g/dL      Hematocrit 38 9 %       fL      MCH 30 2 pg      MCHC 30 3 g/dL      RDW 16 8 %      MPV 12 0 fL      Platelets 552 Thousands/uL     Manual Differential(PHLEBS Do Not Order) [[id number]]  (Abnormal) Collected: 12/21/21 2111    Lab Status: Final result Specimen: Blood from Arm, Left Updated: 12/21/21 2149     Segmented % 74 %      Bands % 7 %      Lymphocytes % 3 %      Monocytes % 14 %      Eosinophils, % 0 %      Basophils % 0 %      Metamyelocytes% 1 %      Atypical Lymphocytes % 1 %      Absolute Neutrophils 29 01 Thousand/uL      Lymphocytes Absolute 1 07 Thousand/uL      Monocytes Absolute 5 01 Thousand/uL      Eosinophils Absolute 0 00 Thousand/uL      Basophils Absolute 0 00 Thousand/uL      Total Counted --     RBC Morphology Present     Anisocytosis Present     Platelet Estimate Adequate     Large Platelet Present    Protime-INR [[id number]]  (Abnormal) Collected: 12/21/21 2111    Lab Status: Final result Specimen: Blood from Arm, Left Updated: 12/21/21 2141     Protime 14 6 seconds      INR 1 16    APTT [[id number]] (Normal) Collected: 12/21/21 2111    Lab Status: Final result Specimen: Blood from Arm, Left Updated: 12/21/21 2141     PTT 31 seconds                  XR chest 1 view portable   ED Interpretation by Governor Francisco DO (12/21 2158)   Worsening RML/RLL pna      Final Result by Ti Strong DO (12/22 1000)      Bilateral airspace disease within the mid to lower lung field on the right and within the left lung base with bibasilar effusions  Findings are suggestive of multifocal pneumonia and consistent with the preliminary report provided by the emergency    department                    Workstation performed: [id number]                    Procedures  ECG 12 Lead Documentation Only    Date/Time: 12/21/2021 8:50 PM  Performed by: Governor Francisco DO  Authorized by: Governor Francisco DO     Indications / Diagnosis:  Sob  ECG reviewed by me, the ED Provider: yes    Patient location:  ED  Previous ECG:     Previous ECG:  Compared to current    Similarity:  Changes noted    Comparison to cardiac monitor: Yes    Interpretation:     Interpretation: abnormal    Rate:     ECG rate:  122bpm    ECG rate assessment: tachycardic    Rhythm:     Rhythm: atrial fibrillation    Ectopy:     Ectopy: none    QRS:     QRS axis:  Normal  Conduction:     Conduction: normal    ST segments:     ST segments:  Normal  T waves:     T waves: normal      CriticalCare Time  Performed by: Governor Francisco DO  Authorized by: Governor Francisco DO     Critical care provider statement:     Critical care time (minutes):  35    Critical care time was exclusive of:  Separately billable procedures and treating other patients and teaching time    Critical care was necessary to treat or prevent imminent or life-threatening deterioration of the following conditions:  Respiratory failure    Critical care was time spent personally by me on the following activities:  Blood draw for specimens, obtaining history from patient or surrogate, development of treatment plan with patient or surrogate, discussions with consultants, evaluation of patient's response to treatment, examination of patient, interpretation of cardiac output measurements, ordering and performing treatments and interventions, ordering and review of laboratory studies, ordering and review of radiographic studies and re-evaluation of patient's condition    I assumed direction of critical care for this patient from another provider in my specialty: no               ED Course                            Initial Sepsis Screening     Row Name 12/21/21 2100                Is the patient's history suggestive of a new or worsening infection? Yes (Proceed)  -OO        Suspected source of infection pneumonia  -OO        Are two or more of the following signs & symptoms of infection both present and new to the patient? Yes (Proceed)  -OO        Indicate SIRS criteria Tachypnea > 20 resp per min;Leukocytosis (WBC > 41766 IJL); Tachycardia > 90 bpm  -OO        If the answer is yes to both questions, suspicion of sepsis is present --        If severe sepsis is present AND tissue hypoperfusion perists in the hour after fluid resuscitation or lactate > 4, the patient meets criteria for SEPTIC SHOCK --        Are any of the following organ dysfunction criteria present within 6 hours of suspected infection and SIRS criteria that are NOT considered to be chronic conditions? Yes  -OO        Organ dysfunction Lactate > 2 0 mmol/L;Acute respiratory failure (new need for invasive or non-invasive mechanical ventilation)  -OO        Date of presentation of severe sepsis 12/21/21  -OO        Time of presentation of severe sepsis 2100  -OO        Tissue hypoperfusion persists in the hour after crystalloid fluid administration, evidenced, by either: --        Was hypotension present within one hour of the conclusion of crystalloid fluid administration?  No  -OO        Date of presentation of septic shock -- Time of presentation of septic shock --              User Key  (r) = Recorded By, (t) = Taken By, (c) = Cosigned By    [address] Name Provider Type    RYANNE Dunne DO Physician                              MDM  Number of Diagnoses or Management Options  Acute respiratory failure [facility]): new and requires workup  Atrial fibrillation with rapid ventricular response [facility]): new and requires workup  Pneumonia: new and requires workup  Severe sepsis [facility]): new and requires workup     Amount and/or Complexity of Data Reviewed  Clinical lab tests: ordered and reviewed  Tests in the radiology section of CPT®: ordered and reviewed    Risk of Complications, Morbidity, and/or Mortality  Presenting problems: high  Diagnostic procedures: high  Management options: high    Patient Progress  Patient progress: stable      Disposition  Final diagnoses:   Pneumonia   Atrial fibrillation with rapid ventricular response ([facility] )   Acute respiratory failure ([facility] )   Severe sepsis ([facility] )     Time reflects when diagnosis was documented in both MDM as applicable and the Disposition within this note     Time User Action Codes Description Comment    12/21/2021 11:06 PM [place]  Add [J18 9] Pneumonia     12/21/2021 11:06 PM [place]  Add [I48 91] Atrial fibrillation with rapid ventricular response ([facility] )     12/21/2021 11:06 PM Lam Steve Comes O Add [J96 00] Acute respiratory failure ([facility] )     12/21/2021 11:14 PM [place]  Add [A41 9,  R65 20] Severe sepsis ([facility] )     12/22/2021  2:01 AM Moris Story Add [J69 0] Aspiration pneumonia due to regurgitated food, unspecified laterality, unspecified part of lung [facility])       ED Disposition     ED Disposition Condition Date/Time Comment    Admit Stable Tue Dec 21, 2021 11:13 PM Case was discussed with SHADI Jackman and the patient's admission status was agreed to be Admission Status: inpatient status to the service of Dr Becky Wright          Follow-up Information    None         Current Discharge Medication List      CONTINUE these medications which have NOT CHANGED    Details   acetaminophen (TYLENOL) 325 mg tablet Take 3 tablets (975 mg total) by mouth every 8 (eight) hours  Refills: 0    Associated Diagnoses: Closed fracture of left hip, initial encounter ([facility])      ascorbic acid (VITAMIN C) 250 MG tablet Take 1 tablet (250 mg total) by mouth daily  Refills: 0    Associated Diagnoses: Closed fracture of left hip, initial encounter ([facility])      aspirin 81 MG tablet Take 81 mg by mouth daily        docusate sodium (COLACE) 100 mg capsule Take 1 capsule (100 mg total) by mouth 2 (two) times a day  Refills: 0    Associated Diagnoses: Closed fracture of left hip, initial encounter ([facility])      enoxaparin (LOVENOX) 40 mg/0 4 mL Inject 0 4 mL (40 mg total) under the skin daily  Refills: 0    Associated Diagnoses: Closed fracture of left hip, initial encounter ([facility])      ferrous sulfate 325 (65 Fe) mg tablet Take 1 tablet (325 mg total) by mouth every other day  Refills: 0    Associated Diagnoses: Closed fracture of left hip, initial encounter ([facility])      folic acid (FOLVITE) 1 mg tablet Take 1 tablet (1 mg total) by mouth daily  Refills: 0    Associated Diagnoses: Closed fracture of left hip, initial encounter ([facility])      losartan (COZAAR) 100 MG tablet Take 50 mg by mouth daily      pantoprazole (PROTONIX) 40 mg tablet Take 1 tablet (40 mg total) by mouth daily in the early morning  Refills: 0    Associated Diagnoses: Closed fracture of left hip, initial encounter ([facility])      atorvastatin (LIPITOR) 80 mg tablet Take 1 tablet (80 mg total) by mouth daily with dinner  Refills: 0    Associated Diagnoses: Acute CVA (cerebrovascular accident) ([address] )      clopidogrel (Plavix) 75 mg tablet Take 1 tablet (75 mg total) by mouth daily  Refills: 0    Associated Diagnoses: Acute CVA (cerebrovascular accident) ([address] )      polyethylene glycol (MIRALAX) 17 g packet Take 17 g by mouth daily as needed (Constipation)  Refills: 0    Associated Diagnoses: Closed fracture of left hip, initial encounter ([facility] )             No discharge procedures on file      PDMP Review     None          ED Provider  Electronically Signed by           Laura Riggins DO  12/24/21 0036

## 2021-12-22 NOTE — ASSESSMENT & PLAN NOTE
· Began post CVA 11/19  · 12/7 witnessed aspiration event  · VBS 12/9 with oropharyngeal dysphagia  · Discharged to facility on puree diet with honey thick liquids  · Was being treated for aspiration pneumonia at facility prior to admission  · Strict NPO status

## 2021-12-22 NOTE — SEPSIS NOTE
Sepsis Note   Rito Alpers 80 y o  female MRN: [id number]  Unit/Bed#: ED 10 Encounter: [id number]       qSOFA     Row Name 12/21/21 2230 12/21/21 2200 12/21/21 2057          Altered mental status GCS < 15 -- -- --      Respiratory Rate > / =22 1 1 1      Systolic BP < / =795 0 0 0      Q Sofa Score 1 1 1                 Initial Sepsis Screening     Row Name 12/21/21 2100                Is the patient's history suggestive of a new or worsening infection? Yes (Proceed)  -OO        Suspected source of infection pneumonia  -OO        Are two or more of the following signs & symptoms of infection both present and new to the patient? Yes (Proceed)  -OO        Indicate SIRS criteria Tachypnea > 20 resp per min;Leukocytosis (WBC > 00604 IJL); Tachycardia > 90 bpm  -OO        If the answer is yes to both questions, suspicion of sepsis is present --        If severe sepsis is present AND tissue hypoperfusion perists in the hour after fluid resuscitation or lactate > 4, the patient meets criteria for SEPTIC SHOCK --        Are any of the following organ dysfunction criteria present within 6 hours of suspected infection and SIRS criteria that are NOT considered to be chronic conditions? Yes  -OO        Organ dysfunction Lactate > 2 0 mmol/L;Acute respiratory failure (new need for invasive or non-invasive mechanical ventilation)  -OO        Date of presentation of severe sepsis 12/21/21  -OO        Time of presentation of severe sepsis 2100  -OO        Tissue hypoperfusion persists in the hour after crystalloid fluid administration, evidenced, by either: --        Was hypotension present within one hour of the conclusion of crystalloid fluid administration?  No  -OO        Date of presentation of septic shock --        Time of presentation of septic shock --              User Key  (r) = Recorded By, (t) = Taken By, (c) = Cosigned By    [id number] Name Provider Type    OO Jose Hicks DO Physician

## 2021-12-22 NOTE — ASSESSMENT & PLAN NOTE
· AFib with rates 115 to 130s  · No prior history  · Likely induced by hypoxia  · Jasmyn 2 Vasc score 8, start heparin gtt for anticoagulation  · Received 15 mg IV Cardizem in ED  · Monitor HR response to fluid bolus, if rate control still needed post bolus will utilize IV Metoprolol

## 2021-12-22 NOTE — UTILIZATION REVIEW
Initial Clinical Review    Admission: Date/Time/Statement:   Admission Orders (From admission, onward)     Ordered        12/21/21 2311  Inpatient Admission  Once                      Orders Placed This Encounter   Procedures    Inpatient Admission     Standing Status:   Standing     Number of Occurrences:   1     Order Specific Question:   Level of Care     Answer:   Critical Care [15]     Order Specific Question:   Estimated length of stay     Answer:   More than 2 Midnights     Order Specific Question:   Certification     Answer:   I certify that inpatient services are medically necessary for this patient for a duration of greater than two midnights  See H&P and MD Progress Notes for additional information about the patient's course of treatment  ED Arrival Information     Expected Arrival Acuity    - 12/21/2021 20:49 Emergent         Means of arrival Escorted by Service Admission type    Ambulance [address]/ICU Emergency         Arrival complaint    Difficulty Breathing         Chief Complaint   Patient presents with    Decreased Oxygen Level     Patient comes via EMS from LTC facility for low oxygen level with O2 devices not successful     Initial Presentation:   89 yof to ER from nursing facility via EMS for worsening hypoxia and resp distress  Pt was recently diagnosed with aspriaion pneumonia, started on a course of IVABT, however patient is worsening  Hx hypertension, hyperlipidemia  Presents tachycardic, tachypneic, hypoxic in respiratory distress with rhonchi & rales noted  Placed on BIPAP in ER  Admission work-up showing leukocytosis, hypokalemia, elevated lactic acid, BNP,     Date: 12/22/21 Day 2:   Placed on BiPAP on admission, however, unable to tolerate BiPAP settings was therefore placed on CPAP 10 with 100% FiO2; currently transitioned to 10ltr HFNC  IVABT continued for aspiration pneumonia  IV Mg & IV K repletion given   IV Heparin gtt in progress for new onset Afib      ED Triage Vitals   Temperature Pulse Respirations Blood Pressure SpO2   12/21/21 2057 12/21/21 2057 12/21/21 2057 12/21/21 2057 12/21/21 2057   97 5 °F (36 4 °C) (!) 122 (!) 36 136/58 (!) 83 %      Temp Source Heart Rate Source Patient Position - Orthostatic VS BP Location FiO2 (%)   12/21/21 2057 12/21/21 2057 12/21/21 2057 12/21/21 2057 --   Tympanic Monitor Lying Right arm       Pain Score       12/22/21 0450       No Pain          Wt Readings from Last 1 Encounters:   12/22/21 58 7 kg (129 lb 6 6 oz)     Additional Vital Signs:   12/22/21 0801 -- 116 Abnormal  57 Abnormal  181/77 Abnormal  110 -- -- -- -- --   12/22/21 0800 -- 116 Abnormal  31 Abnormal  243/180 Abnormal   207 98 % -- -- -- --   12/22/21 0700 -- 114 Abnormal  34 Abnormal  120/60 86 98 % -- -- -- --   12/22/21 0645 -- 121 Abnormal  30 Abnormal  140/57 82 100 % -- -- -- --   12/22/21 0630 -- 108 Abnormal  32 Abnormal  122/49 Abnormal  71 98 % -- -- -- --   12/22/21 0615 -- 103 38 Abnormal  135/59 85 96 % -- -- -- --   12/22/21 0600 -- 112 Abnormal  42 Abnormal  135/51 73 96 % -- -- -- --   12/22/21 0545 97 2 °F (36 2 °C) Abnormal  112 Abnormal  30 Abnormal  129/86 100 99 % -- -- -- --   12/22/21 0530 97 5 °F (36 4 °C) 118 Abnormal  37 Abnormal  125/83 99 95 % -- -- -- --   12/22/21 0515 97 5 °F (36 4 °C) 118 Abnormal  32 Abnormal  145/62 90 97 % -- -- -- --   12/22/21 0500 97 5 °F (36 4 °C) 124 Abnormal  33 Abnormal  131/55 84 99 % -- -- -- --   12/22/21 0450 97 3 °F (36 3 °C) Abnormal  125 Abnormal  30 Abnormal  147/63 91 98 % 10 L/min Mid flow nasal cannula -- Lying   12/22/21 0445 97 5 °F (36 4 °C) 114 Abnormal  33 Abnormal  145/65 90 98 % -- -- -- --   12/22/21 0430 97 8 °F (36 6 °C) 126 Abnormal  38 Abnormal  147/63 91 98 % -- -- -- --   12/22/21 0400 -- 110 Abnormal  24 Abnormal  -- -- -- -- -- -- --   12/22/21 0307 -- 112 Abnormal  21 -- -- 100 % -- -- Full face mask --   12/22/21 0245 -- 100 22 -- -- 100 % -- -- -- --   12/22/21 0231 -- 106 Abnormal  24 Abnormal  133/56 -- 100 % -- BiPAP -- Lying     Pertinent Labs/Diagnostic Test Results:   Results from last 7 days   Lab Units 12/21/21 2111   SARS-COV-2  Negative     Results from last 7 days   Lab Units 12/22/21 0530 12/21/21 2111   WBC Thousand/uL 35 69* 35 82*   HEMOGLOBIN g/dL 9 6* 11 8   HEMATOCRIT % 30 2* 38 9   PLATELETS Thousands/uL 244 274   NEUTROS ABS Thousands/µL 30 32*  --    BANDS PCT %  --  7     Results from last 7 days   Lab Units 12/22/21  0530 12/21/21 2111   SODIUM mmol/L 137 137   POTASSIUM mmol/L 3 3* 3 4*   CHLORIDE mmol/L 103 101   CO2 mmol/L 23 26   ANION GAP mmol/L 11 10   BUN mg/dL 20 19   CREATININE mg/dL 1 37* 1 28   EGFR ml/min/1 73sq m 32 34   CALCIUM mg/dL 7 5* 8 2*   MAGNESIUM mg/dL 2 1 1 7   PHOSPHORUS mg/dL 3 3  --      Results from last 7 days   Lab Units 12/21/21 2111   AST U/L 37   ALT U/L 22   ALK PHOS U/L 99   TOTAL PROTEIN g/dL 5 9*   ALBUMIN g/dL 2 0*   TOTAL BILIRUBIN mg/dL 0 46     Results from last 7 days   Lab Units 12/22/21 0530 12/21/21 2111   GLUCOSE RANDOM mg/dL 118 160*     Results from last 7 days   Lab Units 12/22/21  0201   PH ART  7 468*   PCO2 ART mm Hg 27 9*   PO2 ART mm Hg 119 2   HCO3 ART mmol/L 19 8*   BASE EXC ART mmol/L -2 8   O2 CONTENT ART mL/dL 16 3   O2 HGB, ARTERIAL % 98 2*   ABG SOURCE  Radial, Right     Results from last 7 days   Lab Units 12/22/21  0530 12/22/21  0127 12/21/21 2111   HS TNI 0HR ng/L  --   --  16   HS TNI 2HR ng/L  --  23  --    HSTNI D2 ng/L  --  7  --    HS TNI 4HR ng/L 40  --   --    HSTNI D4 ng/L 24  --   --      Results from last 7 days   Lab Units 12/22/21 0530 12/21/21 2111   PROTIME seconds 15 9* 14 6*   INR  1 30* 1 16   PTT seconds 46* 31     Results from last 7 days   Lab Units 12/22/21  0652 12/22/21  0358 12/22/21  0126 12/21/21  2111   LACTIC ACID mmol/L 3 5* 4 4* 4 3* 3 4*     Results from last 7 days   Lab Units 12/21/21  2111   NT-PRO BNP pg/mL 3,249*     Results from last 7 days Lab Units 12/21/21 2111   INFLUENZA A PCR  Negative   INFLUENZA B PCR  Negative   RSV PCR  Negative     12/21  Cxr=  Bilateral airspace disease within the mid to lower lung field on the right and within the left lung base with bibasilar effusions  Findings are suggestive of multifocal pneumonia and consistent with the preliminary report provided by the emergency   department    Ekg=  Rhythm: atrial fibrillation    Ectopy:     Ectopy: none    QRS:     QRS axis:  Normal  Conduction:     Conduction: normal    ST segments:     ST segments:  Normal  T waves:     T waves: normal      ED Treatment:   Medication Administration from 12/21/2021 2049 to 12/22/2021 0406       Date/Time Order Dose Route Action     12/21/2021 2155 diltiazem (CARDIZEM) injection 15 mg 15 mg Intravenous Given     12/21/2021 2304 vancomycin (VANCOCIN) IVPB (premix in dextrose) 750 mg 150 mL 750 mg Intravenous New Bag     12/21/2021 2247 meropenem (MERREM) 1,000 mg in sodium chloride 0 9 % 100 mL IVPB 1,000 mg Intravenous New Bag     12/21/2021 2350 potassium chloride 20 mEq IVPB (premix) 20 mEq Intravenous New Bag     12/22/2021 0200 magnesium sulfate 2 g/50 mL IVPB (premix) 2 g 2 g Intravenous New Bag     12/22/2021 0030 multi-electrolyte (ISOLYTE-S PH 7 4) bolus 500 mL 500 mL Intravenous New Bag     12/22/2021 0316 multi-electrolyte (ISOLYTE-S PH 7 4) bolus 1,300 mL 1,300 mL Intravenous New Bag        Past Medical History:   Diagnosis Date    Abnormal EKG     Aortic valve stenosis     Arthritis     CKD (chronic kidney disease), stage III ([facility] )     Coronary artery disease     emergent PCI 11/18/13    Hypertension     Mitral and aortic regurgitation     Myocardial infarction ([facility] ) 11/2013    with 1 stent     Present on Admission:   Pneumonia   Acute respiratory failure with hypoxia ([facility] )   Toxic metabolic encephalopathy   Severe sepsis ([facility] )   Hypertension   Dyslipidemia   New onset a-fib ([facility] )   CAD (coronary artery disease)   [facility]   Closed left hip fracture ([facility] )   Peripheral artery disease ([address] )   Aortic stenosis   Dementia ([facility])   Dysphagia    Admitting Diagnosis: Acute respiratory failure ([facility]) [J96 00]  Pneumonia [J18 9]  Difficulty breathing [R06 89]  Atrial fibrillation with rapid ventricular response ([facility]) [I48 91]  Severe sepsis ([facility]) [A41 9, R65 20]  Aspiration pneumonia due to regurgitated food, unspecified laterality, unspecified part of lung ([facility] ) [J69 0]  Age/Sex: 80 y o  female  Admission Orders:  Contact & airborne isolation  Cont pulse ox  Labs per IV weight based Heparin gtt protocol  Urinary retention protocol  Nursing dysphagia assessment  Neurovascular checks q4h  Neuro checks q4h  Pt/ot eval & tx  Scd/foot pumps    Scheduled Medications:  magnesium sulfate 2 g/50 mL IVPB 2 g, Once  multi-electrolyte, 500 mL, Intravenous, Once  piperacillin-tazobactam, 3 375 g, Intravenous, Q6H  potassium chloride 20 mEq IVPB, x 2 doses, 12/22    Continuous IV Infusions:  heparin (porcine), 3-20 Units/kg/hr (Order-Specific), Intravenous, Titrated  multi-electrolyte, 100 mL/hr, Intravenous, Continuous    PRN Meds:  acetaminophen, 650 mg, Rectal, Q6H PRN  vancomycin, 15 mg/kg, Intravenous, Daily PRN    Network Utilization Review Department  ATTENTION: Please call with any questions or concerns to [phone] and carefully listen to the prompts so that you are directed to the right person  All voicemails are confidential   Reina Esquivel all requests for admission clinical reviews, approved or denied determinations and any other requests to dedicated fax number below belonging to the campus where the patient is receiving treatment   List of dedicated fax numbers for the Facilities:  [address] DENIALS (Administrative/Medical Necessity) [phone]   [address] (Maternity/NICU/Pediatrics) 261 [facility],7[facility] Floor [phone]   Jagdish Champion [address]  [phone]   [address] [facility] [place] [address] [facility] [id number] [place] [address] [address] [facility] [phone] [facility] [phone]

## 2021-12-22 NOTE — ASSESSMENT & PLAN NOTE
· POA as evidenced by tachycardia, tachypnea, and leukocytosis with bandemia   Lactic acid 3 4   · Source HCAP/aspiration pneumonia  · Did not receive IV fluids, will give 500 mL isolyte bolus now and monitor response  · Start Isolyte 100mL/hr  · BC x 2 pending  · Check procalcitonin  · Trend lactic q 2 hours until cleared  · Trend times and WBC  · Check UA C&S  · Check strep pneumo and Legionella urine antigens complains of pain/discomfort

## 2021-12-22 NOTE — ED NOTES
Patient has one available IV line with no accessible veins found, each IV med will be infused one at a time       Shiv Alexander RN  12/22/21 8296

## 2021-12-23 NOTE — PROGRESS NOTES
Progress Note - Pulmonary   Pantera Townsend 80 y o  female MRN: [id number]  Unit/Bed#: [address] [place] [address] Encounter: [id number]    Assessment/Plan:    Acute hypoxic respiratory failure due to abnormal chest imaging consistent with multifocal pneumonia with concern for aspiration and severe sepsis present on admission   Titrate oxygen to maintain saturations greater than or equal to 89%  Discontinue BiPAP given aspiration risk  Continue Zosyn, but discontinue vancomycin given negative MRSA culture  Leukocytosis is improving  Monitor temperatures and follow-up procalcitonin level  Add Xopenex 3 times daily for pulmonary hygiene  Hold on any chest PT/vest therapy given aspiration risk  Oropharyngeal dysphagia   Status post CVA in November 2021  NPO and aspiration precautions  Dementia with acute toxic metabolic encephalopathy due to above   Mental status is likely near baseline per discussion with staff and in review of records  Supportive care  Discussed with nursing  Chief Complaint:    I am okay      Subjective:    Patient is resting in bed  She awakens to name  She reports she is okay  She has a loose cough, but denies shortness of breath or pain  Objective:    Vitals: Blood pressure (!) 143/49, pulse 86, temperature 98 °F (36 7 °C), temperature source Axillary, resp  rate 20, height 5' 3" (1 6 m), weight 60 4 kg (133 lb 2 5 oz), SpO2 99 %  2 liters nasal cannula,Body mass index is 23 59 kg/m²  Intake/Output Summary (Last 24 hours) at 12/23/2021 1327  Last data filed at 12/23/2021 0106  Gross per 24 hour   Intake 625 55 ml   Output --   Net 625 55 ml       Invasive Devices  Report    Peripheral Intravenous Line            Peripheral IV 12/22/21 Left Forearm 1 day    Peripheral IV 12/22/21 Right;Upper Arm 1 day                Physical Exam:     Physical Exam  Vitals reviewed  Constitutional:       General: She is not in acute distress  Appearance: She is well-developed   She is not toxic-appearing or diaphoretic  Interventions: Nasal cannula in place  HENT:      Head: Normocephalic and atraumatic  Eyes:      General: No scleral icterus  Neck:      Trachea: No tracheal deviation  Cardiovascular:      Rate and Rhythm: Normal rate and regular rhythm  Heart sounds: S1 normal and S2 normal  No murmur heard  No friction rub  No gallop  Pulmonary:      Effort: Pulmonary effort is normal  No tachypnea, accessory muscle usage or respiratory distress  Breath sounds: No stridor  Rhonchi present  No decreased breath sounds, wheezing or rales  Chest:      Chest wall: No tenderness  Abdominal:      General: Bowel sounds are normal  There is no distension  Palpations: Abdomen is soft  Tenderness: There is no abdominal tenderness  Musculoskeletal:      Cervical back: Neck supple  Skin:     General: Skin is warm and dry  Findings: No rash  Neurological:      Mental Status: She is alert  GCS: GCS eye subscore is 4  GCS verbal subscore is 5  GCS motor subscore is 6  Psychiatric:         Speech: Speech normal          Behavior: Behavior is cooperative         Labs:   CBC:   Lab Results   Component Value Date    WBC 21 07 (H) 12/23/2021    HGB 8 6 (L) 12/23/2021    HCT 27 6 (L) 12/23/2021    MCV 98 12/23/2021     12/23/2021    MCH 30 6 12/23/2021    MCHC 31 2 (L) 12/23/2021    RDW 17 1 (H) 12/23/2021    MPV 10 3 12/23/2021   , CMP:   Lab Results   Component Value Date    SODIUM 142 12/23/2021    K 3 8 12/23/2021     12/23/2021    CO2 26 12/23/2021    BUN 18 12/23/2021    CREATININE 1 15 12/23/2021    CALCIUM 8 1 (L) 12/23/2021    EGFR 39 12/23/2021     Procalcitonin pending    MRSA culture negative    Imaging and other studies: None new

## 2021-12-23 NOTE — PROGRESS NOTES
Vancomycin IV Pharmacy-to-Dose Consultation    Dena Santos is a 80 y o  female who is currently receiving Vancomycin IV with management by the Pharmacy Consult service  Assessment/Plan:  The patient was reviewed  Renal function is stable and no signs or symptoms of nephrotoxicity and/or infusion reactions were documented in the chart  Based on todays assessment, continue current vancomycin (day # 3) dosing of 750 mg IV q24h, with a plan for trough to be drawn at 2100 on 12/24/21  We will continue to follow the patients culture results and clinical progress daily      Johanna Blair, Pharmacist

## 2021-12-23 NOTE — PLAN OF CARE
Problem: RESPIRATORY - ADULT  Goal: Achieves optimal ventilation and oxygenation  Description: INTERVENTIONS:  - Assess for changes in respiratory status  - Assess for changes in mentation and behavior  - Position to facilitate oxygenation and minimize respiratory effort  - Oxygen administered by appropriate delivery if ordered  - Initiate smoking cessation education as indicated  - Encourage broncho-pulmonary hygiene including cough, deep breathe, Incentive Spirometry  - Assess the need for suctioning and aspirate as needed  - Assess and instruct to report SOB or any respiratory difficulty  - Respiratory Therapy support as indicated  Outcome: Progressing     Problem: CARDIOVASCULAR - ADULT  Goal: Maintains optimal cardiac output and hemodynamic stability  Description: INTERVENTIONS:  - Monitor I/O, vital signs and rhythm  - Monitor for S/S and trends of decreased cardiac output  - Administer and titrate ordered vasoactive medications to optimize hemodynamic stability  - Assess quality of pulses, skin color and temperature  - Assess for signs of decreased coronary artery perfusion  - Instruct patient to report change in severity of symptoms  Outcome: Progressing  Goal: Absence of cardiac dysrhythmias or at baseline rhythm  Description: INTERVENTIONS:  - Continuous cardiac monitoring, vital signs, obtain 12 lead EKG if ordered  - Administer antiarrhythmic and heart rate control medications as ordered  - Monitor electrolytes and administer replacement therapy as ordered  Outcome: Progressing     Problem: METABOLIC, FLUID AND ELECTROLYTES - ADULT  Goal: Electrolytes maintained within normal limits  Description: INTERVENTIONS:  - Monitor labs and assess patient for signs and symptoms of electrolyte imbalances  - Administer electrolyte replacement as ordered  - Monitor response to electrolyte replacements, including repeat lab results as appropriate  - Instruct patient on fluid and nutrition as appropriate  Outcome: Progressing  Goal: Fluid balance maintained  Description: INTERVENTIONS:  - Monitor labs   - Monitor I/O and WT  - Instruct patient on fluid and nutrition as appropriate  - Assess for signs & symptoms of volume excess or deficit  Outcome: Progressing       Problem: Nutrition/Hydration-ADULT  Goal: Nutrient/Hydration intake appropriate for improving, restoring or maintaining nutritional needs  Description: Monitor and assess patient's nutrition/hydration status for malnutrition  Collaborate with interdisciplinary team and initiate plan and interventions as ordered  Monitor patient's weight and dietary intake as ordered or per policy  Utilize nutrition screening tool and intervene as necessary  Determine patient's food preferences and provide high-protein, high-caloric foods as appropriate       INTERVENTIONS:  - Monitor oral intake, urinary output, labs, and treatment plans  - Assess nutrition and hydration status and recommend course of action  - Evaluate amount of meals eaten  - Assist patient with eating if necessary   - Allow adequate time for meals  - Recommend/ encourage appropriate diets, oral nutritional supplements, and vitamin/mineral supplements  - Order, calculate, and assess calorie counts as needed  - Recommend, monitor, and adjust tube feedings and TPN/PPN based on assessed needs  - Assess need for intravenous fluids  - Provide specific nutrition/hydration education as appropriate  - Include patient/family/caregiver in decisions related to nutrition  Outcome: Progressing     Problem: PAIN - ADULT  Goal: Verbalizes/displays adequate comfort level or baseline comfort level  Description: Interventions:  - Encourage patient to monitor pain and request assistance  - Assess pain using appropriate pain scale  - Administer analgesics based on type and severity of pain and evaluate response  - Implement non-pharmacological measures as appropriate and evaluate response  - Consider cultural and social influences on pain and pain management  - Notify physician/advanced practitioner if interventions unsuccessful or patient reports new pain  Outcome: Progressing     Problem: INFECTION - ADULT  Goal: Absence or prevention of progression during hospitalization  Description: INTERVENTIONS:  - Assess and monitor for signs and symptoms of infection  - Monitor lab/diagnostic results  - Monitor all insertion sites, i e  indwelling lines, tubes, and drains  - Monitor endotracheal if appropriate and nasal secretions for changes in amount and color  - [place] appropriate cooling/warming therapies per order  - Administer medications as ordered  - Instruct and encourage patient and family to use good hand hygiene technique  - Identify and instruct in appropriate isolation precautions for identified infection/condition  Outcome: Progressing     Problem: MOBILITY - ADULT  Goal: Maintain or return to baseline ADL function  Description: INTERVENTIONS:  -  Assess patient's ability to carry out ADLs; assess patient's baseline for ADL function and identify physical deficits which impact ability to perform ADLs (bathing, care of mouth/teeth, toileting, grooming, dressing, etc )  - Assess/evaluate cause of self-care deficits   - Assess range of motion  - Assess patient's mobility; develop plan if impaired  - Assess patient's need for assistive devices and provide as appropriate  - Encourage maximum independence but intervene and supervise when necessary  - Involve family in performance of ADLs  - Assess for home care needs following discharge   - Consider OT consult to assist with ADL evaluation and planning for discharge  - Provide patient education as appropriate  Outcome: Progressing     Problem: Prexisting or High Potential for Compromised Skin Integrity  Goal: Skin integrity is maintained or improved  Description: INTERVENTIONS:  - Identify patients at risk for skin breakdown  - Assess and monitor skin integrity  - Assess and monitor nutrition and hydration status  - Monitor labs   - Assess for incontinence   - Turn and reposition patient  - Assist with mobility/ambulation  - Relieve pressure over bony prominences  - Avoid friction and shearing  - Provide appropriate hygiene as needed including keeping skin clean and dry  - Evaluate need for skin moisturizer/barrier cream  - Collaborate with interdisciplinary team   - Patient/family teaching  - Consider wound care consult   Outcome: Progressing     Problem: Potential for Falls  Goal: Patient will remain free of falls  Description: INTERVENTIONS:  - Educate patient/family on patient safety including physical limitations  - Instruct patient to call for assistance with activity   - Consult OT/PT to assist with strengthening/mobility   - Keep Call bell within reach  - Keep bed low and locked with side rails adjusted as appropriate  - Keep care items and personal belongings within reach  - Initiate and maintain comfort rounds  - Make Fall Risk Sign visible to staff  - Offer Toileting every 2 Hours, in advance of need  - Initiate/Maintain bed alarm  - Apply yellow socks and bracelet for high fall risk patients  - Consider moving patient to room near nurses station  Outcome: Progressing

## 2021-12-23 NOTE — CONSULTS
The patient's vancomycin therapy has been discontinued  Pharmacy will sign off now, thank you for this consult       Ryan CarD

## 2021-12-24 NOTE — PROGRESS NOTES
Progress Note - Pulmonary   Gloria Bennett 80 y o  female MRN: [id number]  Unit/Bed#: [address] [place] [address] Encounter: [id number]    Assessment/Plan:    Acute hypoxic respiratory failure due to abnormal chest imaging consistent with multifocal pneumonia with concern for aspiration and severe sepsis present on admission              Titrate oxygen to maintain saturations greater than or equal to 89%  Continue Zosyn  Leukocytosis is improving  Monitor temperatures and follow-up procalcitonin level  Continue Xopenex 3 times daily for pulmonary hygiene  Hold on any chest PT/vest therapy given aspiration risk      Oropharyngeal dysphagia              Status post CVA in November 2021  NPO and aspiration precautions      Dementia with acute toxic metabolic encephalopathy due to above              Mental status is likely near baseline per discussion with staff and in review of records  Supportive care  Chief Complaint:    Unable to obtain due to mental status    Subjective: Joni Adkins was found sleeping in bed this morning  She easily awoke to her name  She is in no distress  Objective:    Vitals: Blood pressure 134/68, pulse 89, temperature 97 9 °F (36 6 °C), temperature source Oral, resp  rate 22, height 5' 3" (1 6 m), weight 103 kg (227 lb), SpO2 94 %  2L NC,Body mass index is 40 21 kg/m²  Intake/Output Summary (Last 24 hours) at 12/24/2021 0843  Last data filed at 12/24/2021 5786  Gross per 24 hour   Intake 1100 ml   Output --   Net 1100 ml       Invasive Devices  Report    Peripheral Intravenous Line            Peripheral IV 12/22/21 Left Forearm 2 days    Peripheral IV 12/22/21 Right;Upper Arm 2 days                Physical Exam:     Physical Exam  Vitals reviewed  Constitutional:       General: She is not in acute distress  Appearance: She is well-developed  She is not toxic-appearing or diaphoretic        Interventions: Nasal cannula in place  HENT:      Head: Normocephalic and atraumatic  Eyes:      General: No scleral icterus  Neck:      Trachea: No tracheal deviation  Cardiovascular:      Rate and Rhythm: Normal rate and regular rhythm  Heart sounds: S1 normal and S2 normal  No murmur heard  No friction rub  No gallop  Pulmonary:      Effort: Pulmonary effort is normal  No tachypnea, accessory muscle usage or respiratory distress  Breath sounds: No stridor  Rhonchi present  No decreased breath sounds, wheezing or rales  Chest:      Chest wall: No tenderness  Abdominal:      General: Bowel sounds are normal  There is no distension  Palpations: Abdomen is soft  Tenderness: There is no abdominal tenderness  Musculoskeletal:      Cervical back: Neck supple  Skin:     General: Skin is warm and dry  Findings: No rash  Neurological:      Mental Status: She is alert  GCS: GCS eye subscore is 4  GCS verbal subscore is 5  GCS motor subscore is 6  Psychiatric:         Speech: Speech normal          Behavior: Behavior is cooperative         Labs:   CBC:   Lab Results   Component Value Date    WBC 20 52 (H) 12/24/2021    HGB 9 1 (L) 12/24/2021    HCT 30 1 (L) 12/24/2021     (H) 12/24/2021     12/24/2021    MCH 31 0 12/24/2021    MCHC 30 2 (L) 12/24/2021    RDW 17 4 (H) 12/24/2021    MPV 11 2 12/24/2021   , CMP:   Lab Results   Component Value Date    SODIUM 143 12/24/2021    K 3 5 12/24/2021     12/24/2021    CO2 24 12/24/2021    BUN 17 12/24/2021    CREATININE 1 09 12/24/2021    CALCIUM 8 0 (L) 12/24/2021    EGFR 42 12/24/2021     Imaging and other studies: None today

## 2021-12-24 NOTE — ASSESSMENT & PLAN NOTE
Began post CVA 11/19 12/7 witnessed aspiration event  VBS 12/9 with oropharyngeal dysphagia  Discharged to facility on puree diet with honey thick liquids  Was being treated for aspiration pneumonia at facility prior to admission

## 2021-12-24 NOTE — ASSESSMENT & PLAN NOTE
Patient was noted to be in severe respiratory distress on admission with O2 sat 83% on 100% non-rebreather  Placed on BiPAP on admission, was unable to tolerate BiPAP settings was therefore placed on CPAP 10 with 100% FiO2, patient oxygenating well on this currently  CXR with dense infiltrates in RML/RLL and hazy opacification of LLL

## 2021-12-24 NOTE — ASSESSMENT & PLAN NOTE
POA as evidenced by tachycardia, tachypnea, and leukocytosis with bandemia  Lactic acid 3 4    Source HCAP/aspiration pneumonia  Isolyte    BC x 2 negative  Procalcitonin elevated  Check strep pneumo and Legionella urine antigens

## 2021-12-24 NOTE — DISCHARGE INSTR - OTHER ORDERS
Discharge wound care instructions:  Apply Calazime or comparable moisture barrier cream such as Desitin or A&D ointment three times a day and as needed  Pressure redistribution cushion three times a day and as needed  Moisturize skin each day  [employer] Heel lift boots on both feet while in bed  Moisturize skin each day

## 2021-12-24 NOTE — ASSESSMENT & PLAN NOTE
Left hip fracture after mechanical fall 11/16 s/p hemiarthroplasty 11/17 with Dr Kvng Lr left knee immobilizer and hip precautions through Dec 29th (6 weeks post-op)  Has been on Lovenox 40 mg daily as outpatient  Weight bearing as tolerated  PT/OT

## 2021-12-24 NOTE — ASSESSMENT & PLAN NOTE
Left hip fracture after mechanical fall 11/16 s/p hemiarthroplasty 11/17 with Dr Nicole Aponte left knee immobilizer and hip precautions through Dec 29th (6 weeks post-op)  Has been on Lovenox 40mg daily as outpatient  Weight bearing as tolerated  PT/OT

## 2021-12-24 NOTE — PROGRESS NOTES
[address]  Progress Note - Janey Escobar 12/29/1922, 80 y o  female MRN: [id number]  Unit/Bed#: [address] [place] [address] Encounter: [id number]  Primary Care Provider: Jay Rincon DO   Date and time admitted to hospital: 12/21/2021  8:51 PM    New onset a-fib [facility])  Assessment & Plan  Continue anticoagulation  Heart rate improved      Toxic metabolic encephalopathy  Assessment & Plan  Neurochecks    Dysphagia  Assessment & Plan  Began post CVA 11/19 12/7 witnessed aspiration event  VBS 12/9 with oropharyngeal dysphagia  Discharged to facility on puree diet with honey thick liquids  Was being treated for aspiration pneumonia at facility prior to admission    Severe sepsis [facility])  Assessment & Plan  POA as evidenced by tachycardia, tachypnea, and leukocytosis with bandemia  Lactic acid 3 4    Source HCAP/aspiration pneumonia  Isolyte    BC x 2 pending  Check procalcitonin  Check UA C&S  Check strep pneumo and Legionella urine antigens    Peripheral artery disease (HCC)  Assessment & Plan  ASA/Plavix on hold     CVA (cerebral vascular accident) ([facility] [address] )  Assessment & Plan  Aspirin Plavix on hold  Continue dysphagia diet and aspiration precautions    Closed left hip fracture [facility])  Assessment & Plan  Left hip fracture after mechanical fall 11/16 s/p hemiarthroplasty 11/17 with Dr Bartolo Manuel left knee immobilizer and hip precautions through Dec 29th (6 weeks post-op)  Has been on Lovenox 40mg daily as outpatient  Weight bearing as tolerated  PT/OT    Aortic stenosis  Assessment & Plan  Echo 11/2021 with mild AS, mild AI, preserved EF    Dementia ([facility] [address] )  Assessment & Plan  Supportive care    Pneumonia  Assessment & Plan  HCAP/aspiration pneumonia  Patient was reportedly being treated for aspiration pneumonia with IV cefepime at [facility]  Patient with oropharyngeal dysphagia post CVA in November with recent admission 12/4-12/12 for aspiration pneumonia/acute respiratory failure with hypoxia  Patient received meropenem and vanco in the ED secondary to presumed cefepime failure  Will continue antibiotic therapy with Zosyn  Check strep pneumo and Legionella urine antigen  MRSA negative  Sputum culture   Trend procalcitonin    CAD (coronary artery disease)  Assessment & Plan  History of PCI   Hold ASA, statin, and Plavix for now    Dyslipidemia  Assessment & Plan  Hold atorvastatin    Hypertension  Assessment & Plan  Hold home losartan    * Acute respiratory failure with hypoxia [facility])  Assessment & Plan  Patient was noted to be in severe respiratory distress on admission with O2 sat 83% on 100% non-rebreather  Placed on BiPAP on admission, was unable to tolerate BiPAP settings was therefore placed on CPAP 10 with 100% FiO2, patient oxygenating well on this currently  CXR with dense infiltrates in RML/RLL and hazy opacification of LLL        VTE Pharmacologic Prophylaxis: VTE Score: 4 Moderate Risk (Score 3-4) - Pharmacological DVT Prophylaxis Ordered: enoxaparin (Lovenox)  Patient Centered Rounds: I performed bedside rounds with nursing staff today  Discussions with Specialists or Other Care Team Provider: Case management    Education and Discussions with Family / Patient: Updated  (son) via phone  Time Spent for Care: 45 minutes  More than 50% of total time spent on counseling and coordination of care as described above  Current Length of Stay: 2 day(s)  Current Patient Status: Inpatient   Certification Statement: The patient will continue to require additional inpatient hospital stay due to aspiration pneumonia  Discharge Plan: Anticipate discharge in 48-72 hrs to rehab facility  Code Status: Level 3 - DNAR and DNI    Subjective:   Patient responding appropriately       Objective:     Vitals:   Temp (24hrs), Av 2 °F (36 8 °C), Min:98 °F (36 7 °C), Max:98 3 °F (36 8 °C)    Temp:  [98 °F (36 7 °C)-98 3 °F (36 8 °C)] 98 °F (36 7 °C)  HR:  [] 83  Resp:  [20-32] 20  BP: (124-143)/(49-78) 143/49  SpO2:  [95 %-99 %] 96 %  Body mass index is 23 59 kg/m²  Input and Output Summary (last 24 hours): Intake/Output Summary (Last 24 hours) at 12/23/2021 1930  Last data filed at 12/23/2021 0106  Gross per 24 hour   Intake 350 ml   Output --   Net 350 ml       Physical Exam:   Physical Exam  HENT:      Head: Normocephalic and atraumatic  Mouth/Throat:      Mouth: Mucous membranes are moist    Eyes:      Extraocular Movements: Extraocular movements intact  Cardiovascular:      Rate and Rhythm: Normal rate and regular rhythm  Heart sounds: Normal heart sounds  Pulmonary:      Effort: Pulmonary effort is normal       Breath sounds: Rales present  Abdominal:      General: Abdomen is flat  Bowel sounds are normal       Palpations: Abdomen is soft  Tenderness: There is no abdominal tenderness  Skin:     General: Skin is warm and dry  Neurological:      Mental Status: She is alert  Mental status is at baseline  Additional Data:     Labs:  Results from last 7 days   Lab Units 12/23/21 0450 12/22/21 0530 12/21/21 2111   WBC Thousand/uL 21 07* 35 69*   < >   HEMOGLOBIN g/dL 8 6* 9 6*   < >   HEMATOCRIT % 27 6* 30 2*   < >   PLATELETS Thousands/uL 233 244   < >   BANDS PCT % 3  --    < >   NEUTROS PCT %  --  86*  --    LYMPHS PCT %  --  2*  --    LYMPHO PCT % 8*  --    < >   MONOS PCT %  --  9  --    MONO PCT % 7  --    < >   EOS PCT % 0 0   < >    < > = values in this interval not displayed       Results from last 7 days   Lab Units 12/23/21 0450 12/22/21 0530 12/21/21 2111   SODIUM mmol/L 142   < > 137   POTASSIUM mmol/L 3 8   < > 3 4*   CHLORIDE mmol/L 107   < > 101   CO2 mmol/L 26   < > 26   BUN mg/dL 18   < > 19   CREATININE mg/dL 1 15   < > 1 28   ANION GAP mmol/L 9   < > 10   CALCIUM mg/dL 8 1*   < > 8 2*   ALBUMIN g/dL  --   --  2 0*   TOTAL BILIRUBIN mg/dL  --   --  0 46   ALK PHOS U/L  --   --  99   ALT U/L  --   --  22   AST U/L  --   -- 37   GLUCOSE RANDOM mg/dL 70   < > 160*    < > = values in this interval not displayed  Results from last 7 days   Lab Units 12/22/21  0530   INR  1 30*             Results from last 7 days   Lab Units 12/23/21  0450 12/22/21  0652 12/22/21  0358 12/22/21  0126 12/21/21  2111   LACTIC ACID mmol/L  --  3 5* 4 4* 4 3* 3 4*   PROCALCITONIN ng/ml 9 37* 9 74*  --   --   --        Lines/Drains:  Invasive Devices  Report    Peripheral Intravenous Line            Peripheral IV 12/22/21 Left Forearm 1 day    Peripheral IV 12/22/21 Right;Upper Arm 1 day                  Telemetry:  Telemetry Orders (From admission, onward)             48 Hour Telemetry Monitoring  Continuous x 48 hours        References:    Telemetry Guidelines   Question:  Reason for 48 Hour Telemetry  Answer:  Arrhythmias Requiring Medical Therapy (eg  SVT, Vtach/fib, Bradycardia, Uncontrolled A-fib)                            Imaging: Reviewed radiology reports from this admission including: chest xray    Recent Cultures (last 7 days):   Results from last 7 days   Lab Units 12/21/21 2111   BLOOD CULTURE  No Growth at 24 hrs  No Growth at 24 hrs  Last 24 Hours Medication List:   Current Facility-Administered Medications   Medication Dose Route Frequency Provider Last Rate    acetaminophen  650 mg Rectal Q6H PRN BASILIO BooneNP      enoxaparin  1 mg/kg Subcutaneous Q12H [address] Marianpe L DO Susana      levalbuterol  1 25 mg Nebulization TID TAN [place]      multi-electrolyte  100 mL/hr Intravenous Continuous Melisa Pleva, CRNP 100 mL/hr (12/22/21 0624)    piperacillin-tazobactam  3 375 g Intravenous Q6H BASILIO BooneNP 0 g (12/22/21 1845)    sodium chloride  3 mL Nebulization TID TAN [place]          Today, Patient Was Seen By: Adenike Tracey DO    **Please Note: This note may have been constructed using a voice recognition system  **

## 2021-12-24 NOTE — ASSESSMENT & PLAN NOTE
POA as evidenced by tachycardia, tachypnea, and leukocytosis with bandemia  Lactic acid 3 4    Source HCAP/aspiration pneumonia  Isolyte    BC x 2 pending  Check procalcitonin  Check UA C&S  Check strep pneumo and Legionella urine antigens

## 2021-12-24 NOTE — PLAN OF CARE
Problem: RESPIRATORY - ADULT  Goal: Achieves optimal ventilation and oxygenation  Description: INTERVENTIONS:  - Assess for changes in respiratory status  - Assess for changes in mentation and behavior  - Position to facilitate oxygenation and minimize respiratory effort  - Oxygen administered by appropriate delivery if ordered  - Initiate smoking cessation education as indicated  - Encourage broncho-pulmonary hygiene including cough, deep breathe, Incentive Spirometry  - Assess the need for suctioning and aspirate as needed  - Assess and instruct to report SOB or any respiratory difficulty  - Respiratory Therapy support as indicated  Outcome: Progressing     Problem: CARDIOVASCULAR - ADULT  Goal: Maintains optimal cardiac output and hemodynamic stability  Description: INTERVENTIONS:  - Monitor I/O, vital signs and rhythm  - Monitor for S/S and trends of decreased cardiac output  - Administer and titrate ordered vasoactive medications to optimize hemodynamic stability  - Assess quality of pulses, skin color and temperature  - Assess for signs of decreased coronary artery perfusion  - Instruct patient to report change in severity of symptoms  Outcome: Progressing  Goal: Absence of cardiac dysrhythmias or at baseline rhythm  Description: INTERVENTIONS:  - Continuous cardiac monitoring, vital signs, obtain 12 lead EKG if ordered  - Administer antiarrhythmic and heart rate control medications as ordered  - Monitor electrolytes and administer replacement therapy as ordered  Outcome: Progressing     Problem: METABOLIC, FLUID AND ELECTROLYTES - ADULT  Goal: Electrolytes maintained within normal limits  Description: INTERVENTIONS:  - Monitor labs and assess patient for signs and symptoms of electrolyte imbalances  - Administer electrolyte replacement as ordered  - Monitor response to electrolyte replacements, including repeat lab results as appropriate  - Instruct patient on fluid and nutrition as appropriate  Outcome: Progressing  Goal: Fluid balance maintained  Description: INTERVENTIONS:  - Monitor labs   - Monitor I/O and WT  - Instruct patient on fluid and nutrition as appropriate  - Assess for signs & symptoms of volume excess or deficit  Outcome: Progressing     Problem: SKIN/TISSUE INTEGRITY - ADULT  Goal: Skin Integrity remains intact(Skin Breakdown Prevention)  Description: Assess:  -Perform Reji assessment every shift  -Clean and moisturize skin every 12 hours  -Inspect skin when repositioning, toileting, and assisting with ADLS  -Assess extremities for adequate circulation and sensation     Bed Management:  -Have minimal linens on bed & keep smooth, unwrinkled  -Change linens as needed when moist or perspiring  -Avoid sitting or lying in one position for more than 2 hours while in bed  -Keep HOB at 30d egrees     Toileting:  -Offer bedside commode  -Assess for incontinence every 2 hours    Skin Care:  -Avoid use of baby powder, tape, friction and shearing, hot water or constrictive clothing  -Relieve pressure over bony prominences   -Do not massage red bony areas    Next Steps:  -Consider consults to  interdisciplinary teams such as wound care  Outcome: Progressing  Goal: Incision(s), wounds(s) or drain site(s) healing without S/S of infection  Description: INTERVENTIONS  - Assess and document dressing, incision, wound bed, drain sites and surrounding tissue  - Provide patient and family education  - Perform skin care/dressing changes every 96 hours  Outcome: Progressing  Goal: Pressure injury heals and does not worsen  Description: Interventions:  - Implement low air loss mattress or specialty surface (Criteria met)  - Apply silicone foam dressing  - Apply fecal or urinary incontinence containment device   - Turn and reposition patient & offload bony prominences every 2 hours   - Consider nutrition services referral as needed  Outcome: Progressing     Problem: Nutrition/Hydration-ADULT  Goal: Nutrient/Hydration intake appropriate for improving, restoring or maintaining nutritional needs  Description: Monitor and assess patient's nutrition/hydration status for malnutrition  Collaborate with interdisciplinary team and initiate plan and interventions as ordered  Monitor patient's weight and dietary intake as ordered or per policy  Utilize nutrition screening tool and intervene as necessary  Determine patient's food preferences and provide high-protein, high-caloric foods as appropriate       INTERVENTIONS:  - Monitor oral intake, urinary output, labs, and treatment plans  - Assess nutrition and hydration status and recommend course of action  - Evaluate amount of meals eaten  - Assist patient with eating if necessary   - Allow adequate time for meals  - Recommend/ encourage appropriate diets, oral nutritional supplements, and vitamin/mineral supplements  - Order, calculate, and assess calorie counts as needed  - Recommend, monitor, and adjust tube feedings and TPN/PPN based on assessed needs  - Assess need for intravenous fluids  - Provide specific nutrition/hydration education as appropriate  - Include patient/family/caregiver in decisions related to nutrition  Outcome: Progressing     Problem: PAIN - ADULT  Goal: Verbalizes/displays adequate comfort level or baseline comfort level  Description: Interventions:  - Encourage patient to monitor pain and request assistance  - Assess pain using appropriate pain scale  - Administer analgesics based on type and severity of pain and evaluate response  - Implement non-pharmacological measures as appropriate and evaluate response  - Consider cultural and social influences on pain and pain management  - Notify physician/advanced practitioner if interventions unsuccessful or patient reports new pain  Outcome: Progressing     Problem: INFECTION - ADULT  Goal: Absence or prevention of progression during hospitalization  Description: INTERVENTIONS:  - Assess and monitor for signs and symptoms of infection  - Monitor lab/diagnostic results  - Monitor all insertion sites, i e  indwelling lines, tubes, and drains  - Monitor endotracheal if appropriate and nasal secretions for changes in amount and color  - [place] appropriate cooling/warming therapies per order  - Administer medications as ordered  - Instruct and encourage patient and family to use good hand hygiene technique  - Identify and instruct in appropriate isolation precautions for identified infection/condition  Outcome: Progressing     Problem: DISCHARGE PLANNING  Goal: Discharge to home or other facility with appropriate resources  Description: INTERVENTIONS:  - Identify barriers to discharge w/patient and caregiver  - Arrange for needed discharge resources and transportation as appropriate  - Identify discharge learning needs (meds, wound care, etc )  - Arrange for interpretive services to assist at discharge as needed  - Refer to Case Management Department for coordinating discharge planning if the patient needs post-hospital services based on physician/advanced practitioner order or complex needs related to functional status, cognitive ability, or social support system  Outcome: Progressing     Problem: MOBILITY - ADULT  Goal: Maintain or return to baseline ADL function  Description: INTERVENTIONS:  -  Assess patient's ability to carry out ADLs; assess patient's baseline for ADL function and identify physical deficits which impact ability to perform ADLs (bathing, care of mouth/teeth, toileting, grooming, dressing, etc )  - Assess/evaluate cause of self-care deficits   - Assess range of motion  - Assess patient's mobility; develop plan if impaired  - Assess patient's need for assistive devices and provide as appropriate  - Encourage maximum independence but intervene and supervise when necessary  - Involve family in performance of ADLs  - Assess for home care needs following discharge   - Consider OT consult to assist with ADL evaluation and planning for discharge  - Provide patient education as appropriate  Outcome: Progressing     Problem: Prexisting or High Potential for Compromised Skin Integrity  Goal: Skin integrity is maintained or improved  Description: INTERVENTIONS:  - Identify patients at risk for skin breakdown  - Assess and monitor skin integrity  - Assess and monitor nutrition and hydration status  - Monitor labs   - Assess for incontinence   - Turn and reposition patient  - Assist with mobility/ambulation  - Relieve pressure over bony prominences  - Avoid friction and shearing  - Provide appropriate hygiene as needed including keeping skin clean and dry  - Evaluate need for skin moisturizer/barrier cream  - Collaborate with interdisciplinary team   - Patient/family teaching  - Consider wound care consult   Outcome: Progressing     Problem: Potential for Falls  Goal: Patient will remain free of falls  Description: INTERVENTIONS:  - Educate patient/family on patient safety including physical limitations  - Instruct patient to call for assistance with activity   - Consult OT/PT to assist with strengthening/mobility   - Keep Call bell within reach  - Keep bed low and locked with side rails adjusted as appropriate  - Keep care items and personal belongings within reach  - Initiate and maintain comfort rounds  - Make Fall Risk Sign visible to staff  - Offer Toileting every 2 Hours, in advance of need  - Initiate/Maintain bed alarm  - Apply yellow socks and bracelet for high fall risk patients  - Consider moving patient to room near nurses station  Outcome: Progressing

## 2021-12-24 NOTE — PROGRESS NOTES
[facility] 45  Progress Note - Lisbet Mathur 12/29/1922, 80 y o  female MRN: [id number]  Unit/Bed#: [address] [place] [address] Encounter: [id number]  Primary Care Provider: Jojo Walden DO   Date and time admitted to hospital: 12/21/2021  8:51 PM    New onset a-fib [facility])  Assessment & Plan  Continue anticoagulation  Heart rate improved      Toxic metabolic encephalopathy  Assessment & Plan  Neurochecks    Dysphagia  Assessment & Plan  Began post CVA 11/19 12/7 witnessed aspiration event  VBS 12/9 with oropharyngeal dysphagia  Discharged to facility on puree diet with honey thick liquids  Was being treated for aspiration pneumonia at facility prior to admission    Severe sepsis [facility])  Assessment & Plan  POA as evidenced by tachycardia, tachypnea, and leukocytosis with bandemia  Lactic acid 3 4    Source HCAP/aspiration pneumonia  Isolyte    BC x 2 negative  Procalcitonin elevated  Check strep pneumo and Legionella urine antigens    Peripheral artery disease (HCC)  Assessment & Plan  ASA/Plavix on hold     CVA (cerebral vascular accident) ([facility] [address] [address] )  Assessment & Plan  Aspirin Plavix on hold  Continue dysphagia diet and aspiration precautions    Closed left hip fracture [facility])  Assessment & Plan  Left hip fracture after mechanical fall 11/16 s/p hemiarthroplasty 11/17 with Dr Vira Mckeon left knee immobilizer and hip precautions through Dec 29th (6 weeks post-op)  Has been on Lovenox 40 mg daily as outpatient  Weight bearing as tolerated  PT/OT    Aortic stenosis  Assessment & Plan  Echo 11/2021 with mild AS, mild AI, preserved EF    Dementia ([facility] [address] )  Assessment & Plan  Supportive care    Pneumonia  Assessment & Plan  HCAP/aspiration pneumonia  Patient was reportedly being treated for aspiration pneumonia with IV cefepime at [facility]  Patient with oropharyngeal dysphagia post CVA in November with recent admission 12/4-12/12 for aspiration pneumonia/acute respiratory failure with hypoxia  Patient received meropenem and vanco in the ED secondary to presumed cefepime failure  Will continue antibiotic therapy with Zosyn  Check strep pneumo and Legionella urine antigen  MRSA negative  Sputum culture   Trend procalcitonin    CAD (coronary artery disease)  Assessment & Plan  History of PCI   Hold ASA, statin, and Plavix for now    Dyslipidemia  Assessment & Plan  Hold atorvastatin    Hypertension  Assessment & Plan  Hold home losartan    * Acute respiratory failure with hypoxia [facility])  Assessment & Plan  Patient was noted to be in severe respiratory distress on admission with O2 sat 83% on 100% non-rebreather  Placed on BiPAP on admission, was unable to tolerate BiPAP settings was therefore placed on CPAP 10 with 100% FiO2, patient oxygenating well on this currently  CXR with dense infiltrates in RML/RLL and hazy opacification of LLL        VTE Pharmacologic Prophylaxis: VTE Score: 4 Moderate Risk (Score 3-4) - Pharmacological DVT Prophylaxis Ordered: enoxaparin (Lovenox)  Patient Centered Rounds: I performed bedside rounds with nursing staff today  Discussions with Specialists or Other Care Team Provider: case management    Education and Discussions with Family / Patient: Attempted to update  (son) via phone  Left voicemail  Time Spent for Care: 45 minutes  More than 50% of total time spent on counseling and coordination of care as described above  Current Length of Stay: 3 day(s)  Current Patient Status: Inpatient   Certification Statement: The patient will continue to require additional inpatient hospital stay due to pneumonia  Discharge Plan: Anticipate discharge in 48-72 hrs to to be determined    Code Status: Level 3 - DNAR and DNI    Subjective:   Patient is on 2 L NC  Patient has poor appetite       Objective:     Vitals:   Temp (24hrs), Av 9 °F (36 6 °C), Min:97 7 °F (36 5 °C), Max:98 2 °F (36 8 °C)    Temp:  [97 7 °F (36 5 °C)-98 2 °F (36 8 °C)] 98 2 °F (36 8 °C)  HR:  [89-90] 89  Resp:  [18-22] 19  BP: (125-142)/(68-85) 142/85  SpO2:  [92 %-95 %] 95 %  Body mass index is 40 21 kg/m²  Input and Output Summary (last 24 hours): Intake/Output Summary (Last 24 hours) at 12/24/2021 1840  Last data filed at 12/24/2021 5259  Gross per 24 hour   Intake 1100 ml   Output --   Net 1100 ml       Physical Exam:   Physical Exam  HENT:      Head: Normocephalic and atraumatic  Mouth/Throat:      Mouth: Mucous membranes are moist    Eyes:      Extraocular Movements: Extraocular movements intact  Cardiovascular:      Rate and Rhythm: Normal rate  Rhythm irregular  Pulmonary:      Effort: Pulmonary effort is normal       Breath sounds: Normal breath sounds  Abdominal:      General: Abdomen is flat  Bowel sounds are normal       Palpations: Abdomen is soft  Tenderness: There is no abdominal tenderness  Skin:     General: Skin is warm and dry  Neurological:      General: No focal deficit present  Mental Status: She is alert and oriented to person, place, and time  Additional Data:     Labs:  Results from last 7 days   Lab Units 12/24/21  0505 12/23/21  0450 12/22/21  0530   WBC Thousand/uL 20 52*   < > 35 69*   HEMOGLOBIN g/dL 9 1*   < > 9 6*   HEMATOCRIT % 30 1*   < > 30 2*   PLATELETS Thousands/uL 256   < > 244   BANDS PCT % 4   < >  --    NEUTROS PCT %  --   --  86*   LYMPHS PCT %  --   --  2*   LYMPHO PCT % 5*   < >  --    MONOS PCT %  --   --  9   MONO PCT % 10   < >  --    EOS PCT % 0   < > 0    < > = values in this interval not displayed       Results from last 7 days   Lab Units 12/24/21  0506 12/22/21  0530 12/21/21  2111   SODIUM mmol/L 143   < > 137   POTASSIUM mmol/L 3 5   < > 3 4*   CHLORIDE mmol/L 105   < > 101   CO2 mmol/L 24   < > 26   BUN mg/dL 17   < > 19   CREATININE mg/dL 1 09   < > 1 28   ANION GAP mmol/L 14*   < > 10   CALCIUM mg/dL 8 0*   < > 8 2*   ALBUMIN g/dL  --   --  2 0*   TOTAL BILIRUBIN mg/dL  --   --  0 46   ALK PHOS U/L  --   --  99 ALT U/L  --   --  22   AST U/L  --   --  37   GLUCOSE RANDOM mg/dL 50*   < > 160*    < > = values in this interval not displayed  Results from last 7 days   Lab Units 21  0530   INR  1 30*             Results from last 7 days   Lab Units 21  0450 21  0652 21  0358 21  0126 21  2111   LACTIC ACID mmol/L  --  3 5* 4 4* 4 3* 3 4*   PROCALCITONIN ng/ml 9 37* 9 74*  --   --   --        Lines/Drains:  Invasive Devices  Report    Peripheral Intravenous Line            Peripheral IV 21 Left Forearm 2 days    Peripheral IV 21 Right;Upper Arm 2 days                  Telemetry:  Telemetry Orders (From admission, onward)             48 Hour Telemetry Monitoring  Continuous x 48 hours           References:    Telemetry Guidelines   Question:  Reason for 48 Hour Telemetry  Answer:  Arrhythmias Requiring Medical Therapy (eg  SVT, Vtach/fib, Bradycardia, Uncontrolled A-fib)                            Imaging: Reviewed radiology reports from this admission including: chest xray    Recent Cultures (last 7 days):   Results from last 7 days   Lab Units 211   BLOOD CULTURE  No Growth at 48 hrs  No Growth at 48 hrs  Last 24 Hours Medication List:   Current Facility-Administered Medications   Medication Dose Route Frequency Provider Last Rate    acetaminophen  650 mg Rectal Q6H PRN TAN Mckinnon      [START ON 2021] enoxaparin  40 mg Subcutaneous Daily Bradly Meza DO      levalbuterol  1 25 mg Nebulization TID TAN Menard      multi-electrolyte  60 mL/hr Intravenous Continuous Bradly Meza  mL/hr (21)    piperacillin-tazobactam  3 375 g Intravenous Q6H TAN Mckinnon 3 375 g (21 4531)    sodium chloride  3 mL Nebulization TID TAN Menard          Today, Patient Was Seen By: Mateo Ram DO    **Please Note: This note may have been constructed using a voice recognition system  **

## 2021-12-24 NOTE — ASSESSMENT & PLAN NOTE
HCAP/aspiration pneumonia  Patient was reportedly being treated for aspiration pneumonia with IV cefepime at [facility]  Patient with oropharyngeal dysphagia post CVA in November with recent admission 12/4-12/12 for aspiration pneumonia/acute respiratory failure with hypoxia  Patient received meropenem and vanco in the ED secondary to presumed cefepime failure  Will continue antibiotic therapy with Zosyn  Check strep pneumo and Legionella urine antigen  MRSA negative  Sputum culture   Trend procalcitonin

## 2021-12-24 NOTE — ASSESSMENT & PLAN NOTE
HCAP/aspiration pneumonia  Patient was reportedly being treated for aspiration pneumonia with IV cefepime at [facility]  Patient with oropharyngeal dysphagia post CVA in November with recent admission 12/4-12/12 for aspiration pneumonia/acute respiratory failure with hypoxia  Patient received meropenem and vanco in the ED secondary to presumed cefepime failure  Will continue antibiotic therapy with Zosyn  Check strep pneumo and Legionella urine antigen  MRSA negative  Sputum culture   Trend procalcitonin

## 2021-12-24 NOTE — CONSULTS
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  Diffusely scattered partial thickness skin erosion present on buttocks/sacrum  Area of sacral/buttock skin erosion measures approximately 6 x 5 5 x 0 1 cm  Patient incontinent of urine and stool  Currently Purewick in however now a linear non-blanchable wound present on left inner thigh  Left blood filled blister on posterior heel  Is DTPI  Measures approximately 3 x 3 5 cm  Currently, a Medline heel lift boot on both heels  Right posterior heel with skin discoloration however not indicative of DTPI  Dry skin  Wound/Skin Care Plan:   1  I applied Cavilon Advanced Skin Protectant (CASP) to bilateral buttocks/sacrum/perianal area Apply every three days to area to protect from incontinence  Do not apply Calazime or other lotions/powders to buttocks/sacrum because this will interfere with future applications of CASP  Apply CASP in a single layer with minimal overlap  Due to incontinence, do not cover sacrum/buttocks with bordered foam dressing  Apply CASP on Sunday, 12/26  Wand left with Keke Ovalle RN  Instruction sheet posted in room  2  Continue frequently turn and reposition  Use wedges  3  Moisturize skin each day  4  Keep Medline heel lift boots on both heels while patient in bed  5  Pressure redistribution cushion in chair if patient moves to chair  6  No Purewick due to creating pressure on inner thigh  7  Wound care nurse follow-up       Right great toe      Buttocks and sacrum IAD        Left posterior heel DTPI- blood filled blister        Right posterior heel

## 2021-12-25 NOTE — ASSESSMENT & PLAN NOTE
HCAP/aspiration pneumonia  Patient was reportedly being treated for aspiration pneumonia with IV cefepime at [facility]  Patient with oropharyngeal dysphagia post CVA in November with recent admission 12/4-12/12 for aspiration pneumonia/acute respiratory failure with hypoxia  Patient received meropenem and vanco in the ED secondary to presumed cefepime failure  Will continue antibiotic therapy with Zosyn  Check strep pneumo and Legionella urine antigen  MRSA negative  Sputum culture   Trend procalcitonin

## 2021-12-25 NOTE — PLAN OF CARE
Problem: RESPIRATORY - ADULT  Goal: Achieves optimal ventilation and oxygenation  Description: INTERVENTIONS:  - Assess for changes in respiratory status  - Assess for changes in mentation and behavior  - Position to facilitate oxygenation and minimize respiratory effort  - Oxygen administered by appropriate delivery if ordered  - Initiate smoking cessation education as indicated  - Encourage broncho-pulmonary hygiene including cough, deep breathe, Incentive Spirometry  - Assess the need for suctioning and aspirate as needed  - Assess and instruct to report SOB or any respiratory difficulty  - Respiratory Therapy support as indicated  Outcome: Progressing     Problem: PAIN - ADULT  Goal: Verbalizes/displays adequate comfort level or baseline comfort level  Description: Interventions:  - Encourage patient to monitor pain and request assistance  - Assess pain using appropriate pain scale  - Administer analgesics based on type and severity of pain and evaluate response  - Implement non-pharmacological measures as appropriate and evaluate response  - Consider cultural and social influences on pain and pain management  - Notify physician/advanced practitioner if interventions unsuccessful or patient reports new pain  Outcome: Progressing     Problem: INFECTION - ADULT  Goal: Absence or prevention of progression during hospitalization  Description: INTERVENTIONS:  - Assess and monitor for signs and symptoms of infection  - Monitor lab/diagnostic results  - Monitor all insertion sites, i e  indwelling lines, tubes, and drains  - Monitor endotracheal if appropriate and nasal secretions for changes in amount and color  - [place] appropriate cooling/warming therapies per order  - Administer medications as ordered  - Instruct and encourage patient and family to use good hand hygiene technique  - Identify and instruct in appropriate isolation precautions for identified infection/condition  Outcome: Progressing     Problem: DISCHARGE PLANNING  Goal: Discharge to home or other facility with appropriate resources  Description: INTERVENTIONS:  - Identify barriers to discharge w/patient and caregiver  - Arrange for needed discharge resources and transportation as appropriate  - Identify discharge learning needs (meds, wound care, etc )  - Arrange for interpretive services to assist at discharge as needed  - Refer to Case Management Department for coordinating discharge planning if the patient needs post-hospital services based on physician/advanced practitioner order or complex needs related to functional status, cognitive ability, or social support system  Outcome: Progressing

## 2021-12-25 NOTE — PROGRESS NOTES
Arpan U  66   Progress Note - Salma Pointer 12/29/1922, 80 y o  female MRN: [id number]  Unit/Bed#: [address] [place] [address] Encounter: [id number]  Primary Care Provider: Zeny Miller DO   Date and time admitted to hospital: 12/21/2021  8:51 PM    New onset a-fib [facility])  Assessment & Plan  Off therapeutic anticoagulation  Heart rate improved      Toxic metabolic encephalopathy  Assessment & Plan  Neurochecks    Dysphagia  Assessment & Plan  Began post CVA 11/19 12/7 witnessed aspiration event  VBS 12/9 with oropharyngeal dysphagia  Discharged to facility on puree diet with honey thick liquids  Was being treated for aspiration pneumonia at facility prior to admission    Severe sepsis [facility])  Assessment & Plan  POA as evidenced by tachycardia, tachypnea, and leukocytosis with bandemia  Lactic acid 3 4    Source HCAP/aspiration pneumonia  Isolyte    BC x 2 negative  Procalcitonin elevated  Check strep pneumo and Legionella urine antigens    Peripheral artery disease (HCC)  Assessment & Plan  ASA/Plavix on hold     CVA (cerebral vascular accident) ([facility] [address] )  Assessment & Plan  Aspirin Plavix on hold  Continue dysphagia diet and aspiration precautions    Closed left hip fracture [facility])  Assessment & Plan  Left hip fracture after mechanical fall 11/16 s/p hemiarthroplasty 11/17 with Dr Kelly Rico left knee immobilizer and hip precautions through Dec 29th (6 weeks post-op)  Has been on Lovenox 40 mg daily as outpatient  Weight bearing as tolerated  PT/OT    Aortic stenosis  Assessment & Plan  Echo 11/2021 with mild AS, mild AI, preserved EF    Dementia ([facility] [address] )  Assessment & Plan  Supportive care    Pneumonia  Assessment & Plan  HCAP/aspiration pneumonia  Patient was reportedly being treated for aspiration pneumonia with IV cefepime at [facility]  Patient with oropharyngeal dysphagia post CVA in November with recent admission 12/4-12/12 for aspiration pneumonia/acute respiratory failure with hypoxia  Patient received meropenem and vanco in the ED secondary to presumed cefepime failure  Will continue antibiotic therapy with Zosyn  Check strep pneumo and Legionella urine antigen  MRSA negative  Sputum culture   Trend procalcitonin    CAD (coronary artery disease)  Assessment & Plan  History of PCI   Hold ASA, statin, and Plavix for now    Dyslipidemia  Assessment & Plan  Hold atorvastatin    Hypertension  Assessment & Plan  Hold home losartan    * Acute respiratory failure with hypoxia [facility])  Assessment & Plan  Patient was noted to be in severe respiratory distress on admission with O2 sat 83% on 100% non-rebreather  Placed on BiPAP on admission, was unable to tolerate BiPAP settings was therefore placed on CPAP 10 with 100% FiO2, patient oxygenating well on this currently  CXR with dense infiltrates in RML/RLL and hazy opacification of LLL        VTE Pharmacologic Prophylaxis: VTE Score: 4 Moderate Risk (Score 3-4) - Pharmacological DVT Prophylaxis Ordered: enoxaparin (Lovenox)  Patient Centered Rounds: I performed bedside rounds with nursing staff today  Discussions with Specialists or Other Care Team Provider: case management    Education and Discussions with Family / Patient: Attempted to update  (son) via phone  Left voicemail  Time Spent for Care: 45 minutes  More than 50% of total time spent on counseling and coordination of care as described above  Current Length of Stay: 4 day(s)  Current Patient Status: Inpatient   Certification Statement: The patient will continue to require additional inpatient hospital stay due to aspiration pneumonia  Discharge Plan: Anticipate discharge in 48-72 hrs to rehab facility  Code Status: Level 3 - DNAR and DNI    Subjective:   No acute events overnight  Patient with some increased oral intake       Objective:     Vitals:   Temp (24hrs), Av 1 °F (36 7 °C), Min:98 °F (36 7 °C), Max:98 4 °F (36 9 °C)    Temp:  [98 °F (36 7 °C)-98 4 °F (36 9 °C)] 98 °F (36 7 °C)  HR:  [55-96] 90  Resp:  [18] 18  BP: (135-143)/(66-84) 135/84  SpO2:  [85 %-96 %] 95 %  Body mass index is 39 87 kg/m²  Input and Output Summary (last 24 hours): Intake/Output Summary (Last 24 hours) at 12/25/2021 1830  Last data filed at 12/25/2021 1557  Gross per 24 hour   Intake 1870 ml   Output 500 ml   Net 1370 ml       Physical Exam:   Physical Exam  HENT:      Head: Normocephalic and atraumatic  Mouth/Throat:      Mouth: Mucous membranes are moist    Eyes:      Extraocular Movements: Extraocular movements intact  Cardiovascular:      Rate and Rhythm: Normal rate and regular rhythm  Pulmonary:      Effort: Pulmonary effort is normal       Breath sounds: Rales present  Abdominal:      General: Abdomen is flat  Bowel sounds are normal       Palpations: Abdomen is soft  Tenderness: There is no abdominal tenderness  Skin:     General: Skin is warm and dry  Neurological:      Mental Status: She is alert and oriented to person, place, and time  Additional Data:     Labs:  Results from last 7 days   Lab Units 12/25/21  0540 12/24/21  0505 12/24/21  0505 12/23/21  0450 12/22/21  0530   WBC Thousand/uL 18 86*   < > 20 52*   < > 35 69*   HEMOGLOBIN g/dL 8 6*   < > 9 1*   < > 9 6*   HEMATOCRIT % 29 1*   < > 30 1*   < > 30 2*   PLATELETS Thousands/uL 225   < > 256   < > 244   BANDS PCT %  --   --  4   < >  --    NEUTROS PCT %  --   --   --   --  86*   LYMPHS PCT %  --   --   --   --  2*   LYMPHO PCT %  --   --  5*   < >  --    MONOS PCT %  --   --   --   --  9   MONO PCT %  --   --  10   < >  --    EOS PCT %  --   --  0   < > 0    < > = values in this interval not displayed       Results from last 7 days   Lab Units 12/24/21  0506 12/22/21  0530 12/21/21  2111   SODIUM mmol/L 143   < > 137   POTASSIUM mmol/L 3 5   < > 3 4*   CHLORIDE mmol/L 105   < > 101   CO2 mmol/L 24   < > 26   BUN mg/dL 17   < > 19   CREATININE mg/dL 1 09   < > 1 28   ANION GAP mmol/L 14* < > 10   CALCIUM mg/dL 8 0*   < > 8 2*   ALBUMIN g/dL  --   --  2 0*   TOTAL BILIRUBIN mg/dL  --   --  0 46   ALK PHOS U/L  --   --  99   ALT U/L  --   --  22   AST U/L  --   --  37   GLUCOSE RANDOM mg/dL 50*   < > 160*    < > = values in this interval not displayed  Results from last 7 days   Lab Units 21  0530   INR  1 30*             Results from last 7 days   Lab Units 21  0450 21  0652 21  0358 21  0126 21  2111   LACTIC ACID mmol/L  --  3 5* 4 4* 4 3* 3 4*   PROCALCITONIN ng/ml 9 37* 9 74*  --   --   --        Lines/Drains:  Invasive Devices  Report    Peripheral Intravenous Line            Peripheral IV 21 Left Forearm 3 days    Peripheral IV 21 Right;Upper Arm 3 days          Drain            External Urinary Catheter <1 day                  Telemetry:  Telemetry Orders (From admission, onward)             48 Hour Telemetry Monitoring  Continuous x 48 hours           References:    Telemetry Guidelines   Question:  Reason for 48 Hour Telemetry  Answer:  Arrhythmias Requiring Medical Therapy (eg  SVT, Vtach/fib, Bradycardia, Uncontrolled A-fib)                              Imaging: Reviewed radiology reports from this admission including: chest xray    Recent Cultures (last 7 days):   Results from last 7 days   Lab Units 21   BLOOD CULTURE  No Growth at 72 hrs  No Growth at 72 hrs         Last 24 Hours Medication List:   Current Facility-Administered Medications   Medication Dose Route Frequency Provider Last Rate    acetaminophen  650 mg Rectal Q6H PRN TAN Rios      enoxaparin  40 mg Subcutaneous Daily Bradly Meza DO      levalbuterol  1 25 mg Nebulization TID TAN Bermudez      multi-electrolyte  60 mL/hr Intravenous Continuous Bradly Meza DO 60 mL/hr (21 1557)    piperacillin-tazobactam  3 375 g Intravenous Q6H TAN Rios 3 375 g (21 6798)    sodium chloride  3 mL Nebulization TID [facility] TAN Venegas          Today, Patient Was Seen By: Ashley Allen DO    **Please Note: This note may have been constructed using a voice recognition system  **

## 2021-12-25 NOTE — ASSESSMENT & PLAN NOTE
Left hip fracture after mechanical fall 11/16 s/p hemiarthroplasty 11/17 with Dr Tha Grigsby left knee immobilizer and hip precautions through Dec 29th (6 weeks post-op)  Has been on Lovenox 40 mg daily as outpatient  Weight bearing as tolerated  PT/OT

## 2021-12-26 NOTE — PLAN OF CARE
Problem: RESPIRATORY - ADULT  Goal: Achieves optimal ventilation and oxygenation  Description: INTERVENTIONS:  - Assess for changes in respiratory status  - Assess for changes in mentation and behavior  - Position to facilitate oxygenation and minimize respiratory effort  - Oxygen administered by appropriate delivery if ordered  - Initiate smoking cessation education as indicated  - Encourage broncho-pulmonary hygiene including cough, deep breathe, Incentive Spirometry  - Assess the need for suctioning and aspirate as needed  - Assess and instruct to report SOB or any respiratory difficulty  - Respiratory Therapy support as indicated  Outcome: Progressing     Problem: Nutrition/Hydration-ADULT  Goal: Nutrient/Hydration intake appropriate for improving, restoring or maintaining nutritional needs  Description: Monitor and assess patient's nutrition/hydration status for malnutrition  Collaborate with interdisciplinary team and initiate plan and interventions as ordered  Monitor patient's weight and dietary intake as ordered or per policy  Utilize nutrition screening tool and intervene as necessary  Determine patient's food preferences and provide high-protein, high-caloric foods as appropriate       INTERVENTIONS:  - Monitor oral intake, urinary output, labs, and treatment plans  - Assess nutrition and hydration status and recommend course of action  - Evaluate amount of meals eaten  - Assist patient with eating if necessary   - Allow adequate time for meals  - Recommend/ encourage appropriate diets, oral nutritional supplements, and vitamin/mineral supplements  - Order, calculate, and assess calorie counts as needed  - Recommend, monitor, and adjust tube feedings and TPN/PPN based on assessed needs  - Assess need for intravenous fluids  - Provide specific nutrition/hydration education as appropriate  - Include patient/family/caregiver in decisions related to nutrition  Outcome: Progressing     Problem: CARDIOVASCULAR - ADULT  Goal: Maintains optimal cardiac output and hemodynamic stability  Description: INTERVENTIONS:  - Monitor I/O, vital signs and rhythm  - Monitor for S/S and trends of decreased cardiac output  - Administer and titrate ordered vasoactive medications to optimize hemodynamic stability  - Assess quality of pulses, skin color and temperature  - Assess for signs of decreased coronary artery perfusion  - Instruct patient to report change in severity of symptoms  Outcome: Progressing  Goal: Absence of cardiac dysrhythmias or at baseline rhythm  Description: INTERVENTIONS:  - Continuous cardiac monitoring, vital signs, obtain 12 lead EKG if ordered  - Administer antiarrhythmic and heart rate control medications as ordered  - Monitor electrolytes and administer replacement therapy as ordered  Outcome: Progressing     Problem: METABOLIC, FLUID AND ELECTROLYTES - ADULT  Goal: Electrolytes maintained within normal limits  Description: INTERVENTIONS:  - Monitor labs and assess patient for signs and symptoms of electrolyte imbalances  - Administer electrolyte replacement as ordered  - Monitor response to electrolyte replacements, including repeat lab results as appropriate  - Instruct patient on fluid and nutrition as appropriate  Outcome: Progressing  Goal: Fluid balance maintained  Description: INTERVENTIONS:  - Monitor labs   - Monitor I/O and WT  - Instruct patient on fluid and nutrition as appropriate  - Assess for signs & symptoms of volume excess or deficit  Outcome: Progressing       Outcome: Progressing  Goal: Incision(s), wounds(s) or drain site(s) healing without S/S of infection  Description: INTERVENTIONS  - Assess and document dressing, incision, wound bed, drain sites and surrounding tissue  - Provide patient and family education  - Perform skin care/dressing changes prn / daily  Outcome: Progressing  Goal: Pressure injury heals and does not worsen  Description: Interventions:  - Implement low air loss mattress or specialty surface (Criteria met)  - Apply silicone foam dressing  - Instruct/assist with weight shifting every 60  minutes when in chair   - Limit chair time to 2 hour intervals  - Use special pressure reducing interventions such as waffle when in chair   - Apply fecal or urinary incontinence containment device   - Perform passive or active ROM every q4  - Turn and reposition patient & offload bony prominences every 3 hours   - Utilize friction reducing device or surface for transfers   - Consider nutrition services referral as needed  Outcome: Progressing     Problem: PAIN - ADULT  Goal: Verbalizes/displays adequate comfort level or baseline comfort level  Description: Interventions:  - Encourage patient to monitor pain and request assistance  - Assess pain using appropriate pain scale  - Administer analgesics based on type and severity of pain and evaluate response  - Implement non-pharmacological measures as appropriate and evaluate response  - Consider cultural and social influences on pain and pain management  - Notify physician/advanced practitioner if interventions unsuccessful or patient reports new pain  Outcome: Progressing     Problem: INFECTION - ADULT  Goal: Absence or prevention of progression during hospitalization  Description: INTERVENTIONS:  - Assess and monitor for signs and symptoms of infection  - Monitor lab/diagnostic results  - Monitor all insertion sites, i e  indwelling lines, tubes, and drains  - Monitor endotracheal if appropriate and nasal secretions for changes in amount and color  - [place] appropriate cooling/warming therapies per order  - Administer medications as ordered  - Instruct and encourage patient and family to use good hand hygiene technique  - Identify and instruct in appropriate isolation precautions for identified infection/condition  Outcome: Progressing     Problem: DISCHARGE PLANNING  Goal: Discharge to home or other facility with appropriate resources  Description: INTERVENTIONS:  - Identify barriers to discharge w/patient and caregiver  - Arrange for needed discharge resources and transportation as appropriate  - Identify discharge learning needs (meds, wound care, etc )  - Arrange for interpretive services to assist at discharge as needed  - Refer to Case Management Department for coordinating discharge planning if the patient needs post-hospital services based on physician/advanced practitioner order or complex needs related to functional status, cognitive ability, or social support system  Outcome: Progressing     Problem: MOBILITY - ADULT  Goal: Maintain or return to baseline ADL function  Description: INTERVENTIONS:  -  Assess patient's ability to carry out ADLs; assess patient's baseline for ADL function and identify physical deficits which impact ability to perform ADLs (bathing, care of mouth/teeth, toileting, grooming, dressing, etc )  - Assess/evaluate cause of self-care deficits   - Assess range of motion  - Assess patient's mobility; develop plan if impaired  - Assess patient's need for assistive devices and provide as appropriate  - Encourage maximum independence but intervene and supervise when necessary  - Involve family in performance of ADLs  - Assess for home care needs following discharge   - Consider OT consult to assist with ADL evaluation and planning for discharge  - Provide patient education as appropriate  Outcome: Progressing     Problem: Prexisting or High Potential for Compromised Skin Integrity  Goal: Skin integrity is maintained or improved  Description: INTERVENTIONS:  - Identify patients at risk for skin breakdown  - Assess and monitor skin integrity  - Assess and monitor nutrition and hydration status  - Monitor labs   - Assess for incontinence   - Turn and reposition patient  - Assist with mobility/ambulation  - Relieve pressure over bony prominences  - Avoid friction and shearing  - Provide appropriate hygiene as needed including keeping skin clean and dry  - Evaluate need for skin moisturizer/barrier cream  - Collaborate with interdisciplinary team   - Patient/family teaching  - Consider wound care consult   Outcome: Progressing

## 2021-12-26 NOTE — UTILIZATION REVIEW
Continued Stay Review    Date: 12/26/21                          Current Patient Class: inpatient  Current Level of Care: med surg  HPI:89 y o  female initially admitted on 12/21/21  Assessment/Plan:   HCAP/aspiration pneumonia  O2 requirements currently @ 3ltr nc  Persistent ACEVES, coarse breath sounds noted  WBC trending downward  Vital Signs:   /86   Pulse 73   Temp 97 7 °F (36 5 °C)   Resp 19   Ht 5' 3" (1 6 m)   Wt 66 4 kg (146 lb 4 8 oz)   SpO2 93%   BMI 25 92 kg/m²     Pertinent Labs/Diagnostic Results:   Results from last 7 days   Lab Units 12/21/21 2111   SARS-COV-2  Negative     Results from last 7 days   Lab Units 12/26/21  0617 12/25/21  0540 12/24/21  0505 12/23/21  0450 12/23/21  0450 12/22/21  0530 12/22/21  0530 12/21/21 2111 12/21/21 2111   WBC Thousand/uL 14 95* 18 86* 20 52*   < > 21 07*   < > 35 69*   < > 35 82*   HEMOGLOBIN g/dL 8 7* 8 6* 9 1*  --  8 6*  --  9 6*   < > 11 8   HEMATOCRIT % 28 1* 29 1* 30 1*  --  27 6*  --  30 2*   < > 38 9   PLATELETS Thousands/uL 223 225 256   < > 233   < > 244   < > 274   NEUTROS ABS Thousands/µL  --   --   --   --   --   --  30 32*  --   --    BANDS PCT %  --   --  4  --  3  --   --   --  7    < > = values in this interval not displayed       Results from last 7 days   Lab Units 12/24/21  0506 12/23/21  0450 12/22/21  1458 12/22/21  0530 12/21/21 2111   SODIUM mmol/L 143 142 142 137 137   POTASSIUM mmol/L 3 5 3 8 3 3* 3 3* 3 4*   CHLORIDE mmol/L 105 107 105 103 101   CO2 mmol/L 24 26 27 23 26   ANION GAP mmol/L 14* 9 10 11 10   BUN mg/dL 17 18 21 20 19   CREATININE mg/dL 1 09 1 15 1 12 1 37* 1 28   EGFR ml/min/1 73sq m 42 39 40 32 34   CALCIUM mg/dL 8 0* 8 1* 7 8* 7 5* 8 2*   MAGNESIUM mg/dL  --  2 1  --  2 1 1 7   PHOSPHORUS mg/dL  --   --   --  3 3  --      Results from last 7 days   Lab Units 12/21/21  2111   AST U/L 37   ALT U/L 22   ALK PHOS U/L 99   TOTAL PROTEIN g/dL 5 9*   ALBUMIN g/dL 2 0*   TOTAL BILIRUBIN mg/dL 0 46     Results from last 7 days   Lab Units 12/24/21  0506 12/23/21  0450 12/22/21  1458 12/22/21  0530 12/21/21 2111   GLUCOSE RANDOM mg/dL 50* 70 91 118 160*     Results from last 7 days   Lab Units 12/22/21  0201   PH ART  7 468*   PCO2 ART mm Hg 27 9*   PO2 ART mm Hg 119 2   HCO3 ART mmol/L 19 8*   BASE EXC ART mmol/L -2 8   O2 CONTENT ART mL/dL 16 3   O2 HGB, ARTERIAL % 98 2*   ABG SOURCE  Radial, Right     Results from last 7 days   Lab Units 12/22/21  0530 12/22/21  0127 12/21/21 2111   HS TNI 0HR ng/L  --   --  16   HS TNI 2HR ng/L  --  23  --    HSTNI D2 ng/L  --  7  --    HS TNI 4HR ng/L 40  --   --    HSTNI D4 ng/L 24  --   --      Results from last 7 days   Lab Units 12/23/21  0225 12/22/21  1823 12/22/21  1115 12/22/21  0530 12/22/21  0530 12/21/21 2111 12/21/21 2111   PROTIME seconds  --   --   --   --  15 9*  --  14 6*   INR   --   --   --   --  1 30*  --  1 16   PTT seconds 47* 125* 94*   < > 46*   < > 31    < > = values in this interval not displayed  Results from last 7 days   Lab Units 12/23/21  0450 12/22/21  0652   PROCALCITONIN ng/ml 9 37* 9 74*     Results from last 7 days   Lab Units 12/22/21  0652 12/22/21  0358 12/22/21  0126 12/21/21 2111   LACTIC ACID mmol/L 3 5* 4 4* 4 3* 3 4*     Results from last 7 days   Lab Units 12/21/21 2111   NT-PRO BNP pg/mL 3,249*     Results from last 7 days   Lab Units 12/21/21 2111   INFLUENZA A PCR  Negative   INFLUENZA B PCR  Negative   RSV PCR  Negative     Results from last 7 days   Lab Units 12/21/21 2111   BLOOD CULTURE  No Growth After 4 Days  No Growth After 4 Days       Medications:   Scheduled Medications:  enoxaparin, 40 mg, Subcutaneous, Daily  levalbuterol, 1 25 mg, Nebulization, TID  piperacillin-tazobactam, 3 375 g, Intravenous, Q6H  sodium chloride, 3 mL, Nebulization, TID    Continuous IV Infusions:  multi-electrolyte, 60 mL/hr, Intravenous, Continuous    PRN Meds:  acetaminophen, 650 mg, Rectal, Q6H PRN    Discharge Plan: tbd    Network Utilization Review Department  ATTENTION: Please call with any questions or concerns to [phone] and carefully listen to the prompts so that you are directed to the right person  All voicemails are confidential   Kayli Grand all requests for admission clinical reviews, approved or denied determinations and any other requests to dedicated fax number below belonging to the campus where the patient is receiving treatment   List of dedicated fax numbers for the Facilities:  [address] DENIALS (Administrative/Medical Necessity) [phone]   [address] (Maternity/NICU/Pediatrics) [phone]   [address]  [address] [facility] [place] [address] [address] [place] [address] [address] [place] [address] [phone]

## 2021-12-27 NOTE — PROGRESS NOTES
James 45  Progress Note - Shannon Rodriguez 12/29/1922, 80 y o  female MRN: [id number]  Unit/Bed#: [address] [place] [address] Encounter: [id number]  Primary Care Provider: Gilford Canada,    Date and time admitted to hospital: 12/21/2021  8:51 PM    New onset a-fib [facility])  Assessment & Plan  Off therapeutic anticoagulation  Heart rate improved      Toxic metabolic encephalopathy  Assessment & Plan  Neurochecks    Dysphagia  Assessment & Plan  Began post CVA 11/19 12/7 witnessed aspiration event  VBS 12/9 with oropharyngeal dysphagia  Discharged to facility on puree diet with honey thick liquids  Was being treated for aspiration pneumonia at facility prior to admission    Severe sepsis [facility])  Assessment & Plan  POA as evidenced by tachycardia, tachypnea, and leukocytosis with bandemia  Lactic acid 3 4    Source HCAP/aspiration pneumonia  Isolyte    BC x 2 negative  Procalcitonin elevated  Check strep pneumo and Legionella urine antigens    Peripheral artery disease (HCC)  Assessment & Plan  ASA/Plavix on hold     CVA (cerebral vascular accident) ([facility] [address] )  Assessment & Plan  Aspirin Plavix on hold  Continue dysphagia diet and aspiration precautions    Closed left hip fracture [facility])  Assessment & Plan  Left hip fracture after mechanical fall 11/16 s/p hemiarthroplasty 11/17 with Dr Lynda Garcia left knee immobilizer and hip precautions through Dec 29th (6 weeks post-op)  Has been on Lovenox 40 mg daily as outpatient  Weight bearing as tolerated  PT/OT    Aortic stenosis  Assessment & Plan  Echo 11/2021 with mild AS, mild AI, preserved EF    Dementia ([address] )  Assessment & Plan  Supportive care    Pneumonia  Assessment & Plan  HCAP/aspiration pneumonia  Patient was reportedly being treated for aspiration pneumonia with IV cefepime at [facility]  Patient with oropharyngeal dysphagia post CVA in November with recent admission 12/4-12/12 for aspiration pneumonia/acute respiratory failure with hypoxia  Patient received meropenem and vanco in the ED secondary to presumed cefepime failure  Will continue antibiotic therapy with Zosyn  MRSA negative  Sputum culture   Trend procalcitonin    CAD (coronary artery disease)  Assessment & Plan  History of PCI   Hold ASA, statin, and Plavix for now    Dyslipidemia  Assessment & Plan  Hold atorvastatin    Hypertension  Assessment & Plan  Hold home losartan    * Acute respiratory failure with hypoxia [facility])  Assessment & Plan  Patient was noted to be in severe respiratory distress on admission with O2 sat 83% on 100% non-rebreather  Placed on BiPAP on admission, was unable to tolerate BiPAP settings was therefore placed on CPAP 10 with 100% FiO2, patient oxygenating well on this currently  CXR with dense infiltrates in RML/RLL and hazy opacification of LLL        VTE Pharmacologic Prophylaxis: VTE Score: 4 Moderate Risk (Score 3-4) - Pharmacological DVT Prophylaxis Ordered: enoxaparin (Lovenox)  Patient Centered Rounds: I performed bedside rounds with nursing staff today  Discussions with Specialists or Other Care Team Provider: Pulmonology    Education and Discussions with Family / Patient: Updated  (son) via phone  Time Spent for Care: 45 minutes  More than 50% of total time spent on counseling and coordination of care as described above  Current Length of Stay: 6 day(s)  Current Patient Status: Inpatient   Certification Statement: The patient will continue to require additional inpatient hospital stay due to pneumonia  Discharge Plan: Anticipate discharge tomorrow to rehab facility  Code Status: Level 3 - DNAR and DNI    Subjective:   Patient feeling better      Objective:     Vitals:   Temp (24hrs), Av 9 °F (36 6 °C), Min:97 4 °F (36 3 °C), Max:98 6 °F (37 °C)    Temp:  [97 4 °F (36 3 °C)-98 6 °F (37 °C)] 98 6 °F (37 °C)  HR:  [67-98] 67  Resp:  [18-22] 22  BP: ()/(64-84) 156/64  SpO2:  [90 %-98 %] 95 %  Body mass index is 26 48 kg/m²  Input and Output Summary (last 24 hours): Intake/Output Summary (Last 24 hours) at 12/27/2021 1815  Last data filed at 12/27/2021 1655  Gross per 24 hour   Intake 200 ml   Output --   Net 200 ml       Physical Exam:   Physical Exam  HENT:      Head: Normocephalic and atraumatic  Mouth/Throat:      Mouth: Mucous membranes are moist    Eyes:      Extraocular Movements: Extraocular movements intact  Cardiovascular:      Rate and Rhythm: Normal rate and regular rhythm  Pulmonary:      Effort: Pulmonary effort is normal       Breath sounds: Normal breath sounds  Abdominal:      General: Abdomen is flat  Bowel sounds are normal       Palpations: Abdomen is soft  Tenderness: There is no abdominal tenderness  Skin:     General: Skin is warm and dry  Neurological:      Mental Status: She is alert  Mental status is at baseline  Additional Data:     Labs:  Results from last 7 days   Lab Units 12/27/21  0541 12/25/21  0540 12/24/21  0505   WBC Thousand/uL 15 49*   < > 20 52*   HEMOGLOBIN g/dL 8 4*   < > 9 1*   HEMATOCRIT % 27 2*   < > 30 1*   PLATELETS Thousands/uL 208   < > 256   BANDS PCT %  --   --  4   NEUTROS PCT % 77*  --   --    LYMPHS PCT % 6*  --   --    LYMPHO PCT %  --   --  5*   MONOS PCT % 14*  --   --    MONO PCT %  --   --  10   EOS PCT % 1  --  0    < > = values in this interval not displayed  Results from last 7 days   Lab Units 12/27/21  0541 12/22/21  0530 12/21/21  2111   SODIUM mmol/L 146*   < > 137   POTASSIUM mmol/L 2 5*   < > 3 4*   CHLORIDE mmol/L 110*   < > 101   CO2 mmol/L 26   < > 26   BUN mg/dL 10   < > 19   CREATININE mg/dL 0 83   < > 1 28   ANION GAP mmol/L 10   < > 10   CALCIUM mg/dL 7 8*   < > 8 2*   ALBUMIN g/dL  --   --  2 0*   TOTAL BILIRUBIN mg/dL  --   --  0 46   ALK PHOS U/L  --   --  99   ALT U/L  --   --  22   AST U/L  --   --  37   GLUCOSE RANDOM mg/dL 94   < > 160*    < > = values in this interval not displayed       Results from last 7 days   Lab Units 21  0530   INR  1 30*             Results from last 7 days   Lab Units 21  0541 21  0617 21  0450 21  0652 21  0358 21  0126 21  2111   LACTIC ACID mmol/L  --   --   --  3 5* 4 4* 4 3* 3 4*   PROCALCITONIN ng/ml 0 88* 1 49* 9 37* 9 74*  --   --   --        Lines/Drains:  Invasive Devices  Report    Peripheral Intravenous Line            Peripheral IV 21 Left Forearm 5 days    Peripheral IV 21 Right;Upper Arm 5 days          Drain            External Urinary Catheter 2 days                  Telemetry:  Telemetry Orders (From admission, onward)             48 Hour Telemetry Monitoring  Continuous x 48 hours           References:    Telemetry Guidelines   Question:  Reason for 48 Hour Telemetry  Answer:  Arrhythmias Requiring Medical Therapy (eg  SVT, Vtach/fib, Bradycardia, Uncontrolled A-fib)                              Imaging: Reviewed radiology reports from this admission including: chest xray    Recent Cultures (last 7 days):   Results from last 7 days   Lab Units 21  2111   BLOOD CULTURE  No Growth After 5 Days  No Growth After 5 Days  Last 24 Hours Medication List:   Current Facility-Administered Medications   Medication Dose Route Frequency Provider Last Rate    acetaminophen  650 mg Rectal Q6H PRN TAN Rojas      enoxaparin  40 mg Subcutaneous Daily Bradly Meza DO      levalbuterol  1 25 mg Nebulization TID TAN Morgan      multi-electrolyte  60 mL/hr Intravenous Continuous Bradly Meza DO 60 mL/hr (21 1538)    sodium chloride  3 mL Nebulization TID TAN Morgan          Today, Patient Was Seen By: Ave Jordan DO    **Please Note: This note may have been constructed using a voice recognition system  **

## 2021-12-27 NOTE — PROGRESS NOTES
Progress Note - Pulmonary   Karen Schulz 80 y o  female MRN: [id number]  Unit/Bed#: [address] Encounter: [id number]  Code Status: Level 3 - DNAR and DNI        Assessment/Plan:     Steve Bourne is a 80 y o  Past Medical History:   Diagnosis Date    Abnormal EKG     Aortic valve stenosis     Arthritis     CKD (chronic kidney disease), stage III (HCC)     Coronary artery disease     emergent PCI 11/18/13    Hypertension     Mitral and aortic regurgitation     Myocardial infarction ([facility] [address] ) 11/2013    with 1 stent     81 y/o F w/ Hx CVA in November 2021 / Dementia / Dysphagia Adm 12/21 for  MFPNA most likely d/t aspiration     Multifocal Pneumonia  -Zosyn Day # 7  -Ok to finish Abx today as procal has decreased > 80%   Results from last 7 days   Lab Units 12/26/21  0617 12/23/21  0450 12/22/21  0652   PROCALCITONIN ng/ml 1 49* 9 37* 9 74*     Acute Hypoxemic Respiratory Failure   -2/2 above  -titrate for 02 sat 90% +   Oropharyngeal Dysphagia  -d/t advanced dementia  -speech follows  -recommendations for puree diet / honey thick liquids  -pt is likely to have ongoing aspiration both with eating and with clearing oral secretions and is highly likely to have recurrences  Acute Toxic Metabolic Encephalopathy on Chronic Dementia:  -secondary to above multifactorial processes  -delirium sanitation  -improving  Volume Overload:  -with significant bilateral peripheral edema  -lasix as able / tolerated    _________________________________________________    Subjective / 24 hour events:   No acute events overnight  Remains on 3 L     Pertinent labs Reviewed  Pertinent imaging Reviewed    Collaborative Discussion: d/w IM team   Tele Events:     Vitals:   Vitals:    12/26/21 2242 12/27/21 0311 12/27/21 0600 12/27/21 0710   BP: 153/71 98/71     BP Location:       Pulse: 81 85  81   Resp: 19 18     Temp: (!) 97 4 °F (36 3 °C) 98 3 °F (36 8 °C)     TempSrc:       SpO2: 98% 97%  94%   Weight:   67 8 kg (149 lb 8 oz)    Height: Weight (last 2 days)     Date/Time Weight    21 0600 67 8 (149 5)    21 0600 66 4 (146 3)    21 0600 102 (225 09)        Temp (24hrs), Av 8 °F (36 6 °C), Min:97 4 °F (36 3 °C), Max:98 3 °F (36 8 °C)  Current: Temperature: 98 3 °F (36 8 °C)          IV Infusions:  multi-electrolyte, 60 mL/hr, Last Rate: 60 mL/hr (21 0600)        Nutrition:        Diet Orders   (From admission, onward)             Start     Ordered    21  Diet Dysphagia/Modified Consistency; Dysphagia 1-Pureed; Honey Thick Liquid  Diet effective now        References:    Nutrtion Support Algorithm Enteral vs  Parenteral   Question Answer Comment   Diet Type Dysphagia/Modified Consistency    Dysphagia/Modified Consistency Dysphagia 1-Pureed    Liquid Modifier Honey Thick Liquid    RD to adjust diet per protocol? No        21 19221 0808  Room Service  Once        Question:  Type of Service  Answer:  Room Service - Appropriate with Assistance    21 0807                Ins/Outs:   I/O        0701   0700  0701   0700    P  O  100     I V  (mL/kg) 1000 (15 1)     IV Piggyback      Total Intake(mL/kg) 1100 (16 6)     Urine (mL/kg/hr) 300 (0 2)     Stool      Total Output 300     Net +800           Unmeasured Urine Occurrence 1 x           Lines/Drains:  Invasive Devices  Report    Peripheral Intravenous Line            Peripheral IV 21 Left Forearm 5 days    Peripheral IV 21 Right;Upper Arm 5 days          Drain            External Urinary Catheter 2 days                 Active medications:  Scheduled Meds:  Current Facility-Administered Medications   Medication Dose Route Frequency Provider Last Rate    acetaminophen  650 mg Rectal Q6H PRN TAN Lima      enoxaparin  40 mg Subcutaneous Daily Bradly Meza DO      levalbuterol  1 25 mg Nebulization TID TAN Murphy      multi-electrolyte  60 mL/hr Intravenous Continuous Bradly Meza DO 60 mL/hr (12/27/21 0600)    piperacillin-tazobactam  3 375 g Intravenous Q6H TAN Dutton 3 375 g (12/26/21 2323)    sodium chloride  3 mL Nebulization TID TAN Ricardo       PRN Meds:acetaminophen, 650 mg, Q6H PRN      ____________________________________________________________________    Physical Exam  Constitutional:       Appearance: She is well-developed  Comments: Elderly / frail   HENT:      Head: Normocephalic and atraumatic  Comments: Mask like facies  Eyes:      Conjunctiva/sclera: Conjunctivae normal       Pupils: Pupils are equal, round, and reactive to light  Cardiovascular:      Rate and Rhythm: Normal rate and regular rhythm  Heart sounds: Normal heart sounds  Pulmonary:      Effort: Pulmonary effort is normal  No respiratory distress  Breath sounds: Normal breath sounds  No wheezing or rales  Comments: Currently on 3 L  Chest:      Chest wall: No tenderness  Abdominal:      General: Bowel sounds are normal       Palpations: Abdomen is soft  Musculoskeletal:         General: Normal range of motion  Cervical back: Normal range of motion and neck supple  Right lower leg: 3+ Edema present  Left lower leg: 3+ Edema present  Skin:     General: Skin is warm and dry  Neurological:      Mental Status: She is alert and oriented to person, place, and time  Psychiatric:         Speech: Speech is delayed        Comments: Slow  But appropriate responses       ____________________________________________________________________    Invasive/non-invasive ventilation settings   Respiratory  Report   Lab Data (Last 4 hours)    None         O2/Vent Data (Last 4 hours)    None                Laboratory and Diagnostics:  Results from last 7 days   Lab Units 12/27/21  0541 12/26/21  0617 12/25/21  0540 12/24/21  0505 12/23/21  0450 12/22/21  0530 12/21/21  2111   WBC Thousand/uL 15 49* 14 95* 18 86* 20 52* 21 07* 35 69* 35 82*   HEMOGLOBIN g/dL 8 4* 8 7* 8  6* 9 1* 8 6* 9 6* 11 8   HEMATOCRIT % 27 2* 28 1* 29 1* 30 1* 27 6* 30 2* 38 9   PLATELETS Thousands/uL 208 223 225 256 233 244 274   NEUTROS PCT % 77*  --   --   --   --  86*  --    BANDS PCT %  --   --   --  4 3  --  7   MONOS PCT % 14*  --   --   --   --  9  --    MONO PCT %  --   --   --  10 7  --  14*     Results from last 7 days   Lab Units 12/27/21  0541 12/24/21  0506 12/23/21  0450 12/22/21  1458 12/22/21  0530 12/21/21  2111   SODIUM mmol/L 146* 143 142 142 137 137   POTASSIUM mmol/L 2 5* 3 5 3 8 3 3* 3 3* 3 4*   CHLORIDE mmol/L 110* 105 107 105 103 101   CO2 mmol/L 26 24 26 27 23 26   ANION GAP mmol/L 10 14* 9 10 11 10   BUN mg/dL 10 17 18 21 20 19   CREATININE mg/dL 0 83 1 09 1 15 1 12 1 37* 1 28   CALCIUM mg/dL 7 8* 8 0* 8 1* 7 8* 7 5* 8 2*   GLUCOSE RANDOM mg/dL 94 50* 70 91 118 160*   ALT U/L  --   --   --   --   --  22   AST U/L  --   --   --   --   --  37   ALK PHOS U/L  --   --   --   --   --  99   ALBUMIN g/dL  --   --   --   --   --  2 0*   TOTAL BILIRUBIN mg/dL  --   --   --   --   --  0 46     Results from last 7 days   Lab Units 12/23/21  0450 12/22/21  0530 12/21/21  2111   MAGNESIUM mg/dL 2 1 2 1 1 7   PHOSPHORUS mg/dL  --  3 3  --       Results from last 7 days   Lab Units 12/23/21  0225 12/22/21  1823 12/22/21  1115 12/22/21  0530 12/21/21  2111   INR   --   --   --  1 30* 1 16   PTT seconds 47* 125* 94* 46* 31          Results from last 7 days   Lab Units 12/22/21  0652 12/22/21  0358 12/22/21  0126 12/21/21  2111   LACTIC ACID mmol/L 3 5* 4 4* 4 3* 3 4*     ABG:  Results from last 7 days   Lab Units 12/22/21  0201   PH ART  7 468*   PCO2 ART mm Hg 27 9*   PO2 ART mm Hg 119 2   HCO3 ART mmol/L 19 8*   BASE EXC ART mmol/L -2 8   ABG SOURCE  Radial, Right     VBG:  Results from last 7 days   Lab Units 12/22/21  0201   ABG SOURCE  Radial, Right     Results from last 7 days   Lab Units 12/26/21  0617 12/23/21  0450 12/22/21  0652   PROCALCITONIN ng/ml 1 49* 9 37* 9 74*       Micro  Results from last 7 days   Lab Units 12/22/21  0530 12/21/21 2111   BLOOD CULTURE   --  No Growth After 4 Days  No Growth After 4 Days  MRSA CULTURE ONLY  No Methicillin Resistant Staphlyococcus aureus (MRSA) isolated  --        Imaging:   XR chest 1 view portable   ED Interpretation by Meka Mcgraw DO (12/21 2158)   Worsening RML/RLL pna      Final Result by Krishan Sauer DO (12/22 1000)      Bilateral airspace disease within the mid to lower lung field on the right and within the left lung base with bibasilar effusions  Findings are suggestive of multifocal pneumonia and consistent with the preliminary report provided by the emergency    department  Workstation performed: [id number]             Micro:   Lab Results   Component Value Date    BLOODCX No Growth After 4 Days  12/21/2021    BLOODCX No Growth After 4 Days  12/21/2021    BLOODCX No Growth After 5 Days   12/03/2021    URINECX 80,000-89,000 cfu/ml Escherichia coli (A) 12/03/2021    WOUNDCULT Few Colonies of  11/23/2021    MRSACULTURE  12/22/2021     No Methicillin Resistant Staphlyococcus aureus (MRSA) isolated            Invalid input(s): Ibis Rosales

## 2021-12-27 NOTE — PROGRESS NOTES
James 45  Progress Note - Dena Santos 12/29/1922, 80 y o  female MRN: [id number]  Unit/Bed#: [address] [place] [address] Encounter: [id number]  Primary Care Provider: Kalee Hdz DO   Date and time admitted to hospital: 12/21/2021  8:51 PM    New onset a-fib [facility])  Assessment & Plan  Off therapeutic anticoagulation  Heart rate improved      Toxic metabolic encephalopathy  Assessment & Plan  Neurochecks    Dysphagia  Assessment & Plan  Began post CVA 11/19 12/7 witnessed aspiration event  VBS 12/9 with oropharyngeal dysphagia  Discharged to facility on puree diet with honey thick liquids  Was being treated for aspiration pneumonia at facility prior to admission    Severe sepsis [facility])  Assessment & Plan  POA as evidenced by tachycardia, tachypnea, and leukocytosis with bandemia  Lactic acid 3 4    Source HCAP/aspiration pneumonia  Isolyte    BC x 2 negative  Procalcitonin elevated  Check strep pneumo and Legionella urine antigens    Peripheral artery disease (HCC)  Assessment & Plan  ASA/Plavix on hold     CVA (cerebral vascular accident) ([facility] [address] )  Assessment & Plan  Aspirin Plavix on hold  Continue dysphagia diet and aspiration precautions    Closed left hip fracture [facility])  Assessment & Plan  Left hip fracture after mechanical fall 11/16 s/p hemiarthroplasty 11/17 with Dr Ced Monterroso left knee immobilizer and hip precautions through Dec 29th (6 weeks post-op)  Has been on Lovenox 40 mg daily as outpatient  Weight bearing as tolerated  PT/OT    Aortic stenosis  Assessment & Plan  Echo 11/2021 with mild AS, mild AI, preserved EF    Dementia ([address] )  Assessment & Plan  Supportive care    Pneumonia  Assessment & Plan  HCAP/aspiration pneumonia  Patient was reportedly being treated for aspiration pneumonia with [facility]  Patient with oropharyngeal dysphagia post CVA in November with recent admission 12/4-12/12 for aspiration pneumonia/acute respiratory failure with hypoxia  Patient received meropenem and vanco in the ED secondary to presumed cefepime failure  Will continue antibiotic therapy with Zosyn  MRSA negative  Sputum culture   Trend procalcitonin    CAD (coronary artery disease)  Assessment & Plan  History of PCI   Hold ASA, statin, and Plavix for now    Dyslipidemia  Assessment & Plan  Hold atorvastatin    Hypertension  Assessment & Plan  Hold home losartan    * Acute respiratory failure with hypoxia [facility])  Assessment & Plan  Patient was noted to be in severe respiratory distress on admission with O2 sat 83% on 100% non-rebreather  Placed on BiPAP on admission, was unable to tolerate BiPAP settings was therefore placed on CPAP 10 with 100% FiO2, patient oxygenating well on this currently  CXR with dense infiltrates in RML/RLL and hazy opacification of LLL          VTE Pharmacologic Prophylaxis: VTE Score: 4 Moderate Risk (Score 3-4) - Pharmacological DVT Prophylaxis Ordered: enoxaparin (Lovenox)  Patient Centered Rounds: I performed bedside rounds with nursing staff today  Discussions with Specialists or Other Care Team Provider: case management    Education and Discussions with Family / Patient: Attempted to update  (son) via phone  Left voicemail  Time Spent for Care: 45 minutes  More than 50% of total time spent on counseling and coordination of care as described above  Current Length of Stay: 5 day(s)  Current Patient Status: Inpatient   Certification Statement: The patient will continue to require additional inpatient hospital stay due to pneumonia  Discharge Plan: Anticipate discharge in 24-48 hrs to rehab facility  Code Status: Level 3 - DNAR and DNI    Subjective:   No acute events overnight  Patient stable on nasal cannula       Objective:     Vitals:   Temp (24hrs), Av 1 °F (36 7 °C), Min:97 7 °F (36 5 °C), Max:98 4 °F (36 9 °C)    Temp:  [97 7 °F (36 5 °C)-98 4 °F (36 9 °C)] 97 7 °F (36 5 °C)  HR:  [73-94] 73  Resp:  [18-20] 19  BP: (126-188)/(79-86) 169/86  SpO2:  [90 %-97 %] 90 %  Body mass index is 25 92 kg/m²  Input and Output Summary (last 24 hours): Intake/Output Summary (Last 24 hours) at 12/26/2021 1943  Last data filed at 12/26/2021 0346  Gross per 24 hour   Intake --   Output 300 ml   Net -300 ml       Physical Exam:   Physical Exam  HENT:      Head: Normocephalic and atraumatic  Mouth/Throat:      Mouth: Mucous membranes are moist    Eyes:      Extraocular Movements: Extraocular movements intact  Cardiovascular:      Rate and Rhythm: Normal rate and regular rhythm  Pulmonary:      Effort: Pulmonary effort is normal       Breath sounds: Normal breath sounds  Abdominal:      General: Abdomen is flat  Bowel sounds are normal       Palpations: Abdomen is soft  Tenderness: There is no abdominal tenderness  Musculoskeletal:      Right lower leg: No edema  Left lower leg: No edema  Skin:     General: Skin is warm and dry  Neurological:      Mental Status: She is alert  Mental status is at baseline  Additional Data:     Labs:  Results from last 7 days   Lab Units 12/26/21  0617 12/25/21  0540 12/24/21  0505 12/23/21  0450 12/22/21  0530   WBC Thousand/uL 14 95*   < > 20 52*   < > 35 69*   HEMOGLOBIN g/dL 8 7*   < > 9 1*   < > 9 6*   HEMATOCRIT % 28 1*   < > 30 1*   < > 30 2*   PLATELETS Thousands/uL 223   < > 256   < > 244   BANDS PCT %  --   --  4   < >  --    NEUTROS PCT %  --   --   --   --  86*   LYMPHS PCT %  --   --   --   --  2*   LYMPHO PCT %  --   --  5*   < >  --    MONOS PCT %  --   --   --   --  9   MONO PCT %  --   --  10   < >  --    EOS PCT %  --   --  0   < > 0    < > = values in this interval not displayed       Results from last 7 days   Lab Units 12/24/21  0506 12/22/21  0530 12/21/21  2111   SODIUM mmol/L 143   < > 137   POTASSIUM mmol/L 3 5   < > 3 4*   CHLORIDE mmol/L 105   < > 101   CO2 mmol/L 24   < > 26   BUN mg/dL 17   < > 19   CREATININE mg/dL 1 09   < > 1 28 ANION GAP mmol/L 14*   < > 10   CALCIUM mg/dL 8 0*   < > 8 2*   ALBUMIN g/dL  --   --  2 0*   TOTAL BILIRUBIN mg/dL  --   --  0 46   ALK PHOS U/L  --   --  99   ALT U/L  --   --  22   AST U/L  --   --  37   GLUCOSE RANDOM mg/dL 50*   < > 160*    < > = values in this interval not displayed  Results from last 7 days   Lab Units 21  0530   INR  1 30*             Results from last 7 days   Lab Units 21  0450 21  0652 21  0358 21  0126 21  2111   LACTIC ACID mmol/L  --  3 5* 4 4* 4 3* 3 4*   PROCALCITONIN ng/ml 9 37* 9 74*  --   --   --        Lines/Drains:  Invasive Devices  Report    Peripheral Intravenous Line            Peripheral IV 21 Left Forearm 4 days    Peripheral IV 21 Right;Upper Arm 4 days          Drain            External Urinary Catheter 1 day                  Telemetry:  Telemetry Orders (From admission, onward)             48 Hour Telemetry Monitoring  Continuous x 48 hours           References:    Telemetry Guidelines   Question:  Reason for 48 Hour Telemetry  Answer:  Arrhythmias Requiring Medical Therapy (eg  SVT, Vtach/fib, Bradycardia, Uncontrolled A-fib)                            Imaging: Reviewed radiology reports from this admission including: chest xray    Recent Cultures (last 7 days):   Results from last 7 days   Lab Units 21   BLOOD CULTURE  No Growth After 4 Days  No Growth After 4 Days         Last 24 Hours Medication List:   Current Facility-Administered Medications   Medication Dose Route Frequency Provider Last Rate    acetaminophen  650 mg Rectal Q6H PRN TAN Gamble      enoxaparin  40 mg Subcutaneous Daily Bradly Meza DO      levalbuterol  1 25 mg Nebulization TID TAN Lopez      multi-electrolyte  60 mL/hr Intravenous Continuous Bradly Meza DO 60 mL/hr (21 0925)    piperacillin-tazobactam  3 375 g Intravenous Q6H TAN Gamble 3 375 g (21 4126)    sodium chloride  3 mL Nebulization TID TAN Qureshi          Today, Patient Was Seen By: Kayla Meadows DO    **Please Note: This note may have been constructed using a voice recognition system  **

## 2021-12-27 NOTE — ASSESSMENT & PLAN NOTE
HCAP/aspiration pneumonia  Patient was reportedly being treated for aspiration pneumonia with IV cefepime at [facility]  Patient with oropharyngeal dysphagia post CVA in November with recent admission 12/4-12/12 for aspiration pneumonia/acute respiratory failure with hypoxia  Patient received meropenem and vanco in the ED secondary to presumed cefepime failure  Will continue antibiotic therapy with Zosyn  MRSA negative  Sputum culture   Trend procalcitonin

## 2021-12-27 NOTE — PHYSICAL THERAPY NOTE
PT TREATMENT     12/27/21 1250   Note Type   Note Type Treatment for insurance authorization   Pain Assessment   Pain Assessment Tool 0-10   Pain Score No Pain   Restrictions/Precautions   Weight Bearing Precautions Per Order Yes   LLE Weight Bearing Per Order WBAT   Braces or Orthoses   (knee immobilizer , abduction pillow not in pt's room)   Other Precautions   (Maintain strict hip precautions of abduction pillow and knee immobilizer, no hip flexion past 60 degrees, no hip adduction, no hip IR)   General   Chart Reviewed Yes   Additional Pertinent History Pt has been hospitalized for respiratory failure and has not yet been OOB  Pt has a recent  history of L hip fx/hemiarthroplasty/dislocation  Cognition   Overall Cognitive Status Impaired   Arousal/Participation Cooperative   Following Commands Follows one step commands inconsistently   Subjective   Subjective agreeable to activity   Bed Mobility   Supine to Sit 2  Maximal assistance   Additional items Assist x 2   Sit to Supine 2  Maximal assistance   Additional items Assist x 2   Transfers   Sit to Stand 2  Maximal assistance   Additional items Assist x 2  (UE support on walker, feet blocked due to sliding)   Stand to Sit 2  Maximal assistance   Additional items Assist x 2   Assessment   Prognosis Good   Problem List Decreased strength;Decreased endurance; Impaired balance;Decreased mobility; Decreased range of motion;Decreased cognition;Orthopedic restrictions   Assessment Pt demonstrates slowly improving activity tolerance but remains profoundly weak  Pt was able to stand x 2 trials with max assist x 2 and UE support on walker but was unable to take any steps  Pt should benefit from continued PT to improve participation in functional activity and ability to transfer to a chair or a commode  The patient's AM-PAC Basic Mobility Inpatient Short Form Raw Score is 6   A Raw score of less than or equal to 16 suggests the patient may benefit from discharge to post-acute rehabilitation services  Plan   Treatment/Interventions ADL retraining;Functional transfer training;LE strengthening/ROM; Therapeutic exercise;Cognitive reorientation;Patient/family training;Equipment eval/education; Bed mobility;Gait training   Progress Slow progress, medical status limitations   PT Frequency Other (Comment)  (5x/w)   Recommendation   PT Discharge Recommendation Post acute rehabilitation services   AM-PAC Basic Mobility Inpatient   Turning in Bed Without Bedrails 1   Lying on Back to Sitting on Edge of Flat Bed 1   Moving Bed to Chair 1   Standing Up From Chair 1   Walk in Room 1   Climb 3-5 Stairs 1   Basic Mobility Inpatient Raw Score 6   Turning Head Towards Sound 2   Follow Simple Instructions 2   Low Function Basic Mobility Raw Score 10   Low Function Basic Mobility Standardized Score 14 65   Highest Level Of Mobility   JH-HLM Goal 2: Bed activities/Dependent transfer   JH-HLM Highest Level of Mobility 3: Sit at edge of bed   JH-HLM Goal Achieved Yes   End of Consult   Patient Position at End of Consult Supine; All needs within reach;Bed/Chair alarm activated   Licensure   [place] License Number  Trish Ortiz PT [id number]

## 2021-12-27 NOTE — CASE MANAGEMENT
Case Management Discharge Planning Note    Patient name Belem Wu  Location [address] MRN [id number]  : 1922 Date 2021       Current Admission Date: 2021  Current Admission Diagnosis:Acute respiratory failure with hypoxia [facility])   Patient Active Problem List    Diagnosis Date Noted    Toxic metabolic encephalopathy     New onset a-fib ([facility] [address] ) 2021    Dysphagia 2021    Hypernatremia 2021    Acute on chronic anemia 2021    Severe sepsis ([address] ) 2021    Open wound of toe     Paronychia of toe of right foot     Contusion of right great toe with damage to nail     Peripheral artery disease ([facility] [address] )     Pneumonia of both lower lobes 2021    CVA (cerebral vascular accident) ([facility] [address] ) 2021    Leukocytosis 2021    Dementia ([facility] [address] ) 2021    Aortic stenosis 2021    Fall at home 2021    Closed left hip fracture ([facility] [address] ) 2021    Acute respiratory failure with hypoxia ([facility] [address] ) 2019    Acute blood loss anemia 2019    Pneumonia 2019    CAD (coronary artery disease) 2017    Vertigo 2017    Hypertension 2017    Dyslipidemia 2017    Arthritis 2017      LOS (days): 6  Geometric Mean LOS (GMLOS) (days): 4 80  Days to GMLOS:-0 8     OBJECTIVE:  Risk of Unplanned Readmission Score: 24         Current admission status: Inpatient   Preferred Pharmacy:   [facility], [address] [address]  Phone: [phone] Fax: [address], [address] [facility] [address]  [address] [address]  Phone: [phone] Fax: [phone]    Primary Care Provider: Dawit Sumner DO    [facility] Insurance: [facility]  Secondary Insurance:     DISCHARGE DETAILS:  [address] [place] [address] Number: pending ref# [id number], [facility] started via [employer] for [facility] (NPI [id number]), Dr Stacy Piper (NPI [id number]) clinicals faxed to [phone]

## 2021-12-27 NOTE — OCCUPATIONAL THERAPY NOTE
OT TREATMENT       12/27/21 1325   Note Type   Note Type Treatment for insurance authorization   Restrictions/Precautions   LLE Weight Bearing Per Order WBAT   Braces or Orthoses   (knee immobilizer, abduction pillow)   Other Precautions Chair Alarm; Bed Alarm;Cognitive; Fall Risk;Hard of hearing  (Posterior hip precautions til 12/29/21)   Pain Assessment   Pain Assessment Tool Bee-Baker FACES   Bee-Baker FACES Pain Rating 2   Pain Location/Orientation Orientation: Left; Location: Hip   Hospital Pain Intervention(s) Repositioned; Ambulation/increased activity; Emotional support   ADL   Eating Assistance 2  Maximal Assistance   Eating Deficit Setup;Verbal cueing; Increased time to complete;Bringing food to mouth assist;Scoop assist;Thickened liquids; Beverage management   Eating Comments high aspiratiom risk,sitting up in bed with HOB elevated 60 degrees, pillows behind head to facilitate neutral head position for safer swallowing   Grooming Assistance 1  Total Assistance   UB Bathing Assistance 1  Total Assistance   LB Bathing Assistance 1  Total Assistance   UB Dressing Assistance 2  Maximal Assistance   LB Dressing Assistance 1  Total Assistance   Bed Mobility   Rolling R 2  Maximal assistance   Additional items Assist x 2   Rolling L 2  Maximal assistance   Additional items Assist x 2   Supine to Sit 2  Maximal assistance   Additional items Assist x 2;Verbal cues;LE management   Sit to Supine 2  Maximal assistance   Additional items Assist x 2;Verbal cues;LE management   Transfers   Sit to Stand 2  Maximal assistance   Additional items Assist x 2;Verbal cues  (with RW)   Stand to Sit 2  Maximal assistance   Additional items Assist x 2;Verbal cues  (with RW)   Functional Mobility   Functional Mobility 2  Maximal assistance   Additional Comments of 2 people to stand 10 seconds x 2 trials   Additional items Rolling walker  (& cues to block feet from sliding forward, block knees)   Cognition   Overall Cognitive Status Impaired   Arousal/Participation Alert; Cooperative   Attention Attends with cues to redirect   Orientation Level Oriented to person;Oriented to place;Oriented to situation;Disoriented to time   Memory Decreased recall of recent events;Decreased recall of precautions;Decreased short term memory   Following Commands Follows one step commands with increased time or repetition   Activity Tolerance   Activity Tolerance Patient limited by fatigue;Treatment limited secondary to medical complications (Comment); Patient limited by pain  (strict hip precautions)   Medical Staff Made Aware Melinda Love RN   Assessment   Assessment Pt demonstrating slowly improved strength, balance and functional activity tolerance allowing for increased active participation with ADL and mobility tasks  Following simple one-step directions more consistently with cues and increased time  Able to indicate need for a drink and what she wants to eat when questioned  Overall MaxA/dependent for all ADLS  MaxA of 2 for functional moblity; Co-tx with PT for transition supine to sit and back, to sit at edge of bed, and standing trials x 2 due to strict adherence to total hip precautions and pt is profoundly weak at this time  Pt left sitting in chair with all needs within reach, SCD pumps and tab alarm in place  The patient's raw score on the AM-PAC Daily Activity inpatient short form is 9, standardized score is  , which is less than 39 4  Patients at this level are likely to benefit from discharge to post-acute rehabilitation services  However, please refer to the recommendation of the Occupational Therapist for safe discharge planning  Cont OT per POC  Plan   Treatment Interventions ADL retraining;Functional transfer training;UE strengthening/ROM; Endurance training;Cognitive reorientation;Patient/family training;Equipment evaluation/education; Compensatory technique education; Activityengagement   OT Frequency 3-5x/wk   Recommendation   OT Discharge Recommendation Post acute rehabilitation services   AM-PAC Daily Activity Inpatient   Lower Body Dressing 1   Bathing 1   Toileting 1   Upper Body Dressing 2   Grooming 2   Eating 2   Daily Activity Raw Score 9   Turning Head Towards Sound 3   Follow Simple Instructions 2   Low Function Daily Activity Raw Score 14   Low Function Daily Activity Standardized Score 24 79   AM-PAC Applied Cognition Inpatient   Following a Speech/Presentation 2   Understanding Ordinary Conversation 3   Taking Medications 1   Remembering Where Things Are Placed or Put Away 2   Remembering List of 4-5 Errands 1   Taking Care of Complicated Tasks 1   Applied Cognition Raw Score 10   Applied Cognition Standardized Score 93 05   Licensure   [place] License Number  Author Few Kenda Kayser [address], OTR/L, [facility] Lic# [id number]

## 2021-12-27 NOTE — ASSESSMENT & PLAN NOTE
Left hip fracture after mechanical fall 11/16 s/p hemiarthroplasty 11/17 with Dr White left knee immobilizer and hip precautions through Dec 29th (6 weeks post-op)  Has been on Lovenox 40 mg daily as outpatient  Weight bearing as tolerated  PT/OT

## 2021-12-27 NOTE — CASE MANAGEMENT
Case Management Assessment & Discharge Planning Note    Patient name Shannon Rodriguez  Location 2 [facility] 68 210/2 [facility] 210 MRN [id number]  : 1922 Date 2021       Current Admission Date: 2021  Current Admission Diagnosis:Acute respiratory failure with hypoxia [facility])   Patient Active Problem List    Diagnosis Date Noted    Toxic metabolic encephalopathy     New onset a-fib ([facility] ) 2021    Dysphagia 2021    Hypernatremia 2021    Acute on chronic anemia 2021    Severe sepsis ([facility] ) 2021    Open wound of toe     Paronychia of toe of right foot     Contusion of right great toe with damage to nail     Peripheral artery disease ([facility] )     Pneumonia of both lower lobes 2021    CVA (cerebral vascular accident) ([facility] ) 2021    Leukocytosis 2021    Dementia ([facility] ) 2021    Aortic stenosis 2021    Fall at home 2021    Closed left hip fracture ([facility] ) 2021    Acute respiratory failure with hypoxia ([facility] ) 2019    Acute blood loss anemia 2019    Pneumonia 2019    CAD (coronary artery disease) 2017    Vertigo 2017    Hypertension 2017    Dyslipidemia 2017    Arthritis 2017      LOS (days): 6  Geometric Mean LOS (GMLOS) (days): 4 80  Days to GMLOS:-0 7     OBJECTIVE:  PATIENT READMITTED TO HOSPITAL  Risk of Unplanned Readmission Score: 24     Current admission status: Inpatient  Referral Reason: Rehab    Preferred Pharmacy:   Mulu Hood, [address] [address]  Phone: [phone] Fax: [address] [facility] 100  [address] [address]  Phone: [phone] Fax: [phone]    Primary Care Provider: Gilford Canada,     Primary Insurance: Lieutenant Ebbs [facility] REP  Secondary Insurance:     ASSESSMENT:  Viri Morgan Agents Jhoan Ramey - Daughter   Primary Phone: [phone] (Mobile)               Advance Directives  Does patient have a [address]?: Yes  Does patient have Advance Directives?: Yes  Advance Directives: Living will,Power of  for health care  Primary Contact: Dr Chandana Khan    Readmission Root Cause  30 Day Readmission: Yes  Who directed you to return to the hospital?: Other (comment) (facility)  Did you understand whom to contact if you had questions or problems?:  (in facility)  Did you get your prescriptions before you left the hospital?: No (went to rehab)  Were you able to get your prescriptions filled when you left the hospital?: Yes  Did you take your medications as prescribed?: Yes  Were you able to get to your follow-up appointments? :  (in rehab facility)  During previous admission, was a post-acute recommendation made?: Yes  What post-acute resources were offered?: STR  Patient was readmitted due to: respiratory failure, aspiration pneumonia  Action Plan: medical management, CM follow up with facility regarding diet order    Patient Information  Admitted from[de-identified] Facility [facility])  During Assessment patient was accompanied by: Not accompanied during assessment  Assessment information provided by[de-identified] Son  Primary Caregiver:  Other (Comment)  Caregiver's Name[de-identified] [address] [facility] Relationship to Patient[de-identified] Other (Specify) (rehab facility)  [address] [facility] Telephone Number[de-identified] [phone]  Support Systems: [address] of Residence: [address] [place] [address] do you live in?: [place]  Type of Current Residence: Facility (rehab facility)  Living Arrangements: Other (Comment) (currently in [facility] for rehab)    Activities of Daily Living Prior to Admission  Functional Status: Assistance  Completes ADLs independently?: No  Level of ADL dependence: Assistance  Ambulates independently?: No  Level of ambulatory dependence: Assistance    Patient Information Continued  Income Source: Pension/FCI  Does patient have prescription coverage?: Yes    DISCHARGE DETAILS:    Discharge planning discussed with[de-identified] Son, Dr Claireskip Rolonmann of Choice: Yes  Comments - Freedom of Choice: SW spoke with pt's son,   Elenita Montenegro, to assist with DCP  Pt was readmitted from [facility]  Son expressed concern considering this is pt's second readmission from facility due to dislocated hip and aspiration pneumonia  SW offered to follow up with facility about concerns  Son is planning to talk with his sister about pt returning to [facility] or going to another facility  Talked with son about making blanket referrals and he is agreeable with that  SW requested call back with decision so referral can be made and authorization request can be submitted  SW will follow  CM contacted family/caregiver?: Yes  Were Treatment Team discharge recommendations reviewed with patient/caregiver?: Yes  Did patient/caregiver verbalize understanding of patient care needs?: Yes  Were patient/caregiver advised of the risks associated with not following Treatment Team discharge recommendations?: Yes    Contacts  Patient Contacts: Dr Yvonne Donis  Relationship to Patient[de-identified] Family (son)  Contact Method: Phone  Phone Number: [phone]  Reason/Outcome: Discharge Planning,Continuity of Care,Other (Comment) (IMM)    [address]         Is the patient interested in [facility] at discharge?: No    DME Referral Provided  Referral made for DME?: No    Other Referral/Resources/Interventions Provided:  Interventions: Short Term Rehab  Referral Comments: Referral made to [facility] to prepare for return if family chooses  Additional referrals made to determine alternate availability      Treatment Team Recommendation: Short Term Rehab  Discharge Destination Plan[de-identified] Short Term Rehab  Transport at Discharge : BLS Ambulance    IMM Given (Date):: 12/27/21  IMM Given to[de-identified] Family (son)  Family notified[de-identified] IMM reviewed with pt's son, Dr Bruno Sun, over phone  Son verbalized understanding and agrees with discharge determination  Son requested copy be placed in pt's room  Copy also placed in scan bin for chart

## 2021-12-27 NOTE — CASE MANAGEMENT
Case Management Progress Note    Patient name Valeria Aguirre  Location 2 [facility] 210/2 [facility] 210 MRN [id number]  : 1922 Date 2021       LOS (days): 6  Geometric Mean LOS (GMLOS) (days): 4 80  Days to GMLOS:-0 8        OBJECTIVE:        Current admission status: Inpatient  Preferred Pharmacy:   Kassie Champion, [address] [address]  Phone: [phone] Fax: [phone] [address]  [address], [address] [address]  Phone: [phone] Fax: [phone]    Primary Care Provider: Nery Silva DO    Primary Insurance: Odilia Rosaliakrystin [facility]  Secondary Insurance:     PROGRESS NOTE:    SW following to assist with DCP  Return to skilled rehab facility is planned  SW met with pt's daughter, Scotty Hanson, when she arrived to discuss family's facility choice  Per daughter family does not want to return to [facility]  SW provided daughter with list of facilities referrals had been made to as discussed with her brother  Daughter reviewed list and said either [facility] or [address] would be her preference  SW explored availability  [facility] has availability, [facility] doesn't due to [address] Elif Moya does not have any availability at this time  SW discussed with daughter  Daughter will be calling brother to confirm plan  SW will follow

## 2021-12-27 NOTE — ASSESSMENT & PLAN NOTE
HCAP/aspiration pneumonia  Patient was reportedly being treated for aspiration pneumonia with IV cefepime at [facility]  Patient with oropharyngeal dysphagia post CVA in November with recent admission 12/4-12/12 for aspiration pneumonia/acute respiratory failure with hypoxia  Patient received meropenem and vanco in the ED secondary to presumed cefepime failure  Will continue antibiotic therapy with Zosyn  MRSA negative  Sputum culture   Trend procalcitonin

## 2021-12-27 NOTE — CASE MANAGEMENT
Case Management Discharge Planning Note    Patient name Emerson Hays  [facility] MRN [id number]  : 1922 Date 2021       Current Admission Date: 2021  Current Admission Diagnosis:Acute respiratory failure with hypoxia [facility])   Patient Active Problem List    Diagnosis Date Noted    Toxic metabolic encephalopathy     New onset a-fib ([facility] ) 2021    Dysphagia 2021    Hypernatremia 2021    Acute on chronic anemia 2021    Severe sepsis ([facility] ) 2021    Open wound of toe     Paronychia of toe of right foot     Contusion of right great toe with damage to nail     Peripheral artery disease ([facility] )     Pneumonia of both lower lobes 2021    CVA (cerebral vascular accident) ([facility] ) 2021    Leukocytosis 2021    Dementia ([facility] ) 2021    Aortic stenosis 2021    Fall at home 2021    Closed left hip fracture ([facility] ) 2021    Acute respiratory failure with hypoxia ([facility] ) 2019    Acute blood loss anemia 2019    Pneumonia 2019    CAD (coronary artery disease) 2017    Vertigo 2017    Hypertension 2017    Dyslipidemia 2017    Arthritis 2017      LOS (days): 6  Geometric Mean LOS (GMLOS) (days): 4 80  Days to GMLOS:-0 9     OBJECTIVE:  Risk of Unplanned Readmission Score: 24     Current admission status: Inpatient   Preferred Pharmacy:   Mumtaz Tabor, [address] [address]  Phone: [phone] Fax: [address] [facility] 100  [address]  [address] [address]  Phone: [phone] Fax: [phone]    Primary Care Provider: Glen Flores DO    Primary Insurance: Amol Dalton [facility] RE[facility]  Secondary Insurance:     DISCHARGE DETAILS:    Discharge planning discussed with[de-identified] Abraham Gan Cloud of Choice: Yes  Comments - Freedom of Choice: SW received call from pt's daughter, Alexi Tubbs  After considering options she and her brother have decided to have pt return to [facility], not go to [facility]  JAI will contact [facility] to change authorization request   CM contacted family/caregiver?: Yes  Were Treatment Team discharge recommendations reviewed with patient/caregiver?: Yes  Did patient/caregiver verbalize understanding of patient care needs?: Yes  Were patient/caregiver advised of the risks associated with not following Treatment Team discharge recommendations?: Yes    Contacts  Patient Contacts: Alexi Tubbs  Relationship to Patient[de-identified] Family (son)  Contact Method: In Person  Reason/Outcome: Discharge Planning    Other Referral/Resources/Interventions Provided:  Interventions: Short Term Rehab  Referral Comments: SW spoke with Farhad Jain in [place] admissions to inform her of family's change in preference  Per [facility] will be able to accept pt whenever ready  SW placed call to [address] [place] [address]   , Harman Bonilla, to inform her of change  Authorization request is still pending with [facility] medical director      Treatment Team Recommendation: Short Term Rehab  Discharge Destination Plan[de-identified] Short Term Rehab [facility])  Transport at Discharge : S Ambulance

## 2021-12-27 NOTE — ASSESSMENT & PLAN NOTE
Left hip fracture after mechanical fall 11/16 s/p hemiarthroplasty 11/17 with Dr Beverly Claude left knee immobilizer and hip precautions through Dec 29th (6 weeks post-op)  Has been on Lovenox 40 mg daily as outpatient  Weight bearing as tolerated  PT/OT

## 2021-12-28 NOTE — ASSESSMENT & PLAN NOTE
Began post CVA 11/19 12/7 witnessed aspiration event  VBS 12/9 with oropharyngeal dysphagia  Discharged to facility on puree diet with honey thick liquids  Was being treated for aspiration pneumonia at facility prior to admission  12/29 changed to NPO after does not seem to tolerate even puree diet with honey thick liquids requiring suction after every spoonful of feed

## 2021-12-28 NOTE — CASE MANAGEMENT
Case Management Progress Note    Patient name Mark Goldberg  Location 2 [place]/2 [place] [address] MRN [id number]  : 1922 Date 2021       LOS (days): 7  Geometric Mean LOS (GMLOS) (days): 4 80  Days to GMLOS:-1 7        OBJECTIVE:        Current admission status: Inpatient  Preferred Pharmacy:   Elzbieta Jennings, [address] [address]  Phone: [phone] Fax: [phone] [address]  [address], [address]  [address], [address] [address]  Phone: [phone] Fax: [phone]    Primary Care Provider: Rosario Armando DO    Primary Insurance: Mike Trevino [facility]  Secondary Insurance:     PROGRESS NOTE:    Per Dr Bisi Bowser pt is not stable for discharge  Transportation arrangements cancelled  Will notify [facility]  Dr Bisi Bowser will be talking with family about goals of care  SW will continue to follow to support and assist with planning as needed

## 2021-12-28 NOTE — CASE MANAGEMENT
Case Management Discharge Planning Note    Patient name Yuliya Srivastava  Location 2 [facility] 210/2 [facility] 210 MRN [id number]  : 1922 Date 2021       Current Admission Date: 2021  Current Admission Diagnosis:Severe sepsis [facility])   Patient Active Problem List    Diagnosis Date Noted    Toxic metabolic encephalopathy     New onset a-fib ([address] ) 2021    Dysphagia 2021    Hypernatremia 2021    Acute on chronic anemia 2021    Severe sepsis ([address] ) 2021    Open wound of toe     Paronychia of toe of right foot     Contusion of right great toe with damage to nail     Peripheral artery disease ([address] )     Pneumonia of both lower lobes 2021    CVA (cerebral vascular accident) ([address] ) 2021    Leukocytosis 2021    Dementia ([address] ) 2021    Aortic stenosis 2021    Fall at home 2021    Closed left hip fracture ([address] ) 2021    Acute respiratory failure with hypoxia ([address] ) 2019    Acute blood loss anemia 2019    Pneumonia 2019    CAD (coronary artery disease) 2017    Vertigo 2017    Hypertension 2017    Dyslipidemia 2017    Arthritis 2017      LOS (days): 7  Geometric Mean LOS (GMLOS) (days): 4 80  Days to GMLOS:-1 6     OBJECTIVE:  Risk of Unplanned Readmission Score: 21     Current admission status: Inpatient   Preferred Pharmacy:   Tania Ngo, [address] [address]  Phone: [phone] Fax: [address] [facility] 100  [address],   [address] [address]  Phone: [phone] Fax: [phone]    Primary Care Provider: Bessie Crane DO    Primary Insurance: [id number]  [facility]  Secondary Insurance:     DISCHARGE DETAILS:    Other Referral/Resources/Interventions Provided:  Interventions: Short Term Rehab  Referral Comments: STR planned    Bed available for pt at [facility]  Authorization was received this morning from [address] [place] [address] Rita Wright  (# [id number])  Notified Dr Marcie Dumont of ability to transfer today      Treatment Team Recommendation: Short Term Rehab  Discharge Destination Plan[de-identified] Short Term Rehab [facility])  Transport at Discharge : [facility]  Dispatcher Contacted: Yes  Number/Name of Dispatcher: Burt Jha by Lala and Unit #):  (pending)  ETA of Transport (Date): 12/28/21  ETA of Transport (Time):  (pending)

## 2021-12-28 NOTE — ASSESSMENT & PLAN NOTE
POA as evidenced by tachycardia, tachypnea, and leukocytosis with bandemia  Lactic acid 3 4    Source HCAP/aspiration pneumonia, recurrent  BC x 2 negative  Procalcitonin elevated initially, down treated with IV antibiotics  Noted to have ongoing aspirations with worsening of leukocytosis

## 2021-12-28 NOTE — PROGRESS NOTES
[address] Internal Medicine Progress Note  Patient: Lisbet Mathur 80 y o  female   MRN: [id number]  PCP: Jojo Walden DO  Unit/Bed#: [address] [place] [address] Encounter: [id number]  Date Of Visit: 12/28/21    Problem List:    Principal Problem:    Severe sepsis ([facility] )  Active Problems:    Pneumonia    Toxic metabolic encephalopathy    Acute respiratory failure with hypoxia ([facility] )    Closed left hip fracture (HCC)    CVA (cerebral vascular accident) ([facility] )    Dysphagia    New onset a-fib ([facility] )    Hypertension    CAD (coronary artery disease)    Peripheral artery disease ([facility] )    Dyslipidemia    Dementia ([place] [address] )    Aortic stenosis      Assessment & Plan:    Toxic metabolic encephalopathy  Assessment & Plan  Neurochecks    Pneumonia  Assessment & Plan  HCAP/aspiration pneumonia  Patient was reportedly being treated for aspiration pneumonia with IV cefepime at [facility]  Patient with oropharyngeal dysphagia post CVA in November with recent admission 12/4-12/12 for aspiration pneumonia/acute respiratory failure with hypoxia  Patient received meropenem and vanco in the ED secondary to presumed cefepime failure  Status post antibiotic therapy with Zosyn with initial improvement but worsening again due to recurrent aspiration   Goals of care discussed   Agree with hospice evaluation    * Severe sepsis [facility])  Assessment & Plan  POA as evidenced by tachycardia, tachypnea, and leukocytosis with bandemia  Lactic acid 3 4    Source HCAP/aspiration pneumonia, recurrent  BC x 2 negative  Procalcitonin elevated initially, down treated with IV antibiotics  Noted to have ongoing aspirations with worsening of leukocytosis       New onset a-fib [facility])  Assessment & Plan  Off therapeutic anticoagulation  Heart rate improved      Dysphagia  Assessment & Plan  Began post CVA 11/19 12/7 witnessed aspiration event  VBS 12/9 with oropharyngeal dysphagia  Discharged to facility on puree diet with honey thick liquids  Was being treated for aspiration pneumonia at facility prior to admission    CVA (cerebral vascular accident) [facility])  Assessment & Plan  Aspirin Plavix on hold  Continue dysphagia diet and aspiration precautions    Closed left hip fracture [facility])  Assessment & Plan  Left hip fracture after mechanical fall 11/16 s/p hemiarthroplasty 11/17 with Dr Jon Lees left knee immobilizer and hip precautions through Dec 29th (6 weeks post-op)  Has been on Lovenox 40 mg daily as outpatient  Weight bearing as tolerated  PT/OT    Acute respiratory failure with hypoxia [facility])  Assessment & Plan  Patient was noted to be in severe respiratory distress on admission with O2 sat 83% on 100% non-rebreather  Placed on BiPAP on admission, was unable to tolerate BiPAP settings was therefore placed on CPAP initially  CXR with dense infiltrates in RML/RLL and hazy opacification of LLL  Not a good candidate for NIPPV due to mental status and-aspiration   Continue supplemental oxygen as needed  Aspiration precaution   Frequent suction      Peripheral artery disease ([facility] )  Assessment & Plan  ASA/Plavix on hold     CAD (coronary artery disease)  Assessment & Plan  History of PCI   Hold ASA, statin, and Plavix for now    Hypertension  Assessment & Plan  Hold home losartan    Aortic stenosis  Assessment & Plan  Echo 11/2021 with mild AS, mild AI, preserved EF    Dementia (HCC)  Assessment & Plan  Supportive care    Dyslipidemia  Assessment & Plan  Hold atorvastatin          VTE Pharmacologic Prophylaxis: VTE Score: 4 Moderate Risk (Score 3-4) - Pharmacological DVT Prophylaxis Ordered: enoxaparin (Lovenox)  Patient Centered Rounds: I performed bedside rounds with nursing staff today  Discussions with Specialists or Other Care Team Provider: yes    Education and Discussions with Family / Patient: Updated  (daughter) at bedside  Attempted to call son over the phone, unable to reach  Voicemail box is full    Time Spent for Care: 45 minutes   More than 50% of total time spent on counseling and coordination of care as described above  Current Length of Stay: 7 day(s)  Current Patient Status: Inpatient   Certification Statement: The patient will continue to require additional inpatient hospital stay due to Hospice evaluation  Discharge Plan: Anticipate discharge in 24-48 hrs to discharge location to be determined pending rehab evaluations  Code Status: Level 3 - DNAR and DNI    Subjective:   Noted to have increasing leukocytosis, worsening of oxygen requirement   Unable to tolerate p o  diet, noted to obvious aspiration  Worsening of hypernatremia and electrolyte abnormalities    Discussed with patient's daughter over the phone at bedside, goals of care were discussed  Family is requesting hospice evaluation    Objective:     Vitals:   Temp (24hrs), Av 7 °F (36 5 °C), Min:97 2 °F (36 2 °C), Max:98 6 °F (37 °C)    Temp:  [97 2 °F (36 2 °C)-98 6 °F (37 °C)] 97 2 °F (36 2 °C)  HR:  [] 84  Resp:  [20-22] 22  BP: (156-175)/() 170/110  SpO2:  [90 %-98 %] 96 %  Body mass index is 26 48 kg/m²  Input and Output Summary (last 24 hours): Intake/Output Summary (Last 24 hours) at 2021 1048  Last data filed at 2021 1655  Gross per 24 hour   Intake 50 ml   Output --   Net 50 ml       Physical Exam:   Physical Exam  Constitutional:       General: She is not in acute distress  Appearance: She is ill-appearing  Comments: Frail,   HENT:      Head: Normocephalic and atraumatic  Cardiovascular:      Rate and Rhythm: Normal rate  Pulmonary:      Effort: Pulmonary effort is normal  No respiratory distress  Breath sounds: Rhonchi present  No wheezing or rales  Comments: Course breath sounds, upper respiratory secretions, gurgling noted  Abdominal:      General: Bowel sounds are normal  There is no distension  Palpations: Abdomen is soft  Tenderness: There is no abdominal tenderness   There is no guarding or rebound  Skin:     General: Skin is warm and dry  Findings: No rash  Neurological:      Mental Status: She is alert  Additional Data:     Labs:  Results from last 7 days   Lab Units 12/28/21  0709 12/27/21  0541 12/24/21  0505   WBC Thousand/uL 24 83* 15 49*   < >   HEMOGLOBIN g/dL 9 6* 8 4*   < >   HEMATOCRIT % 33 0* 27 2*   < >   PLATELETS Thousands/uL 205 208   < >   BANDS PCT % 1  --    < >   NEUTROS PCT %  --  77*  --    LYMPHS PCT %  --  6*  --    LYMPHO PCT % 5*  --    < >   MONOS PCT %  --  14*  --    MONO PCT % 10  --    < >   EOS PCT % 1 1   < >    < > = values in this interval not displayed  Results from last 7 days   Lab Units 12/28/21  0709 12/22/21  0530 12/21/21  2111   SODIUM mmol/L 151*   < > 137   POTASSIUM mmol/L 3 1*   < > 3 4*   CHLORIDE mmol/L 113*   < > 101   CO2 mmol/L 27   < > 26   BUN mg/dL 11   < > 19   CREATININE mg/dL 0 93   < > 1 28   ANION GAP mmol/L 11   < > 10   CALCIUM mg/dL 8 3   < > 8 2*   ALBUMIN g/dL  --   --  2 0*   TOTAL BILIRUBIN mg/dL  --   --  0 46   ALK PHOS U/L  --   --  99   ALT U/L  --   --  22   AST U/L  --   --  37   GLUCOSE RANDOM mg/dL 116   < > 160*    < > = values in this interval not displayed       Results from last 7 days   Lab Units 12/22/21  0530   INR  1 30*             Results from last 7 days   Lab Units 12/27/21  0541 12/26/21  0617 12/23/21  0450 12/22/21  0652 12/22/21  0358 12/22/21  0126 12/21/21  2111   LACTIC ACID mmol/L  --   --   --  3 5* 4 4* 4 3* 3 4*   PROCALCITONIN ng/ml 0 88* 1 49* 9 37* 9 74*  --   --   --        Lines/Drains:  Invasive Devices  Report    Peripheral Intravenous Line            Peripheral IV 12/22/21 Left Forearm 6 days    Peripheral IV 12/22/21 Right;Upper Arm 6 days          Drain            External Urinary Catheter 3 days                      Imaging: Reviewed radiology reports from this admission including: chest xray    Recent Cultures (last 7 days):   Results from last 7 days   Lab Units 12/21/21 2111   BLOOD CULTURE  No Growth After 5 Days  No Growth After 5 Days  Last 24 Hours Medication List:   Current Facility-Administered Medications   Medication Dose Route Frequency Provider Last Rate    acetaminophen  650 mg Rectal Q6H PRN TAN Reed      dextrose  75 mL/hr Intravenous Continuous Pillo Lu MD 75 mL/hr (12/28/21 0830)    enoxaparin  40 mg Subcutaneous Daily Bradly Meza DO      levalbuterol  1 25 mg Nebulization TID TAN Reed      potassium chloride  20 mEq Intravenous Once Pillo Lu MD 20 mEq (12/28/21 0900)    Followed by   Brigette Franks potassium chloride  20 mEq Intravenous Once Pillo Lu MD      sodium chloride  3 mL Nebulization TID TAN Reed          Today, Patient Was Seen By: Pillo Lu MD    ** Please Note: "This note has been constructed using a voice recognition system  Therefore there may be syntax, spelling, and/or grammatical errors   Please call if you have any questions  "**

## 2021-12-28 NOTE — CASE MANAGEMENT
Case Management Discharge Planning Note    Patient name Tammy Hidalgo  [facility] [address] [facility] MRN [id number]  : 1922 Date 2021       Current Admission Date: 2021  Current Admission Diagnosis:Severe sepsis [facility])   Patient Active Problem List    Diagnosis Date Noted    Toxic metabolic encephalopathy     New onset a-fib ([facility] ) 2021    Dysphagia 2021    Hypernatremia 2021    Acute on chronic anemia 2021    Severe sepsis ([facility] ) 2021    Open wound of toe     Paronychia of toe of right foot     Contusion of right great toe with damage to nail     Peripheral artery disease ([facility] )     Pneumonia of both lower lobes 2021    CVA (cerebral vascular accident) ([facility] ) 2021    Leukocytosis 2021    Dementia ([facility] ) 2021    Aortic stenosis 2021    Fall at home 2021    Closed left hip fracture ([facility] ) 2021    Acute respiratory failure with hypoxia ([facility] ) 2019    Acute blood loss anemia 2019    Pneumonia 2019    CAD (coronary artery disease) 2017    Vertigo 2017    Hypertension 2017    Dyslipidemia 2017    Arthritis 2017      LOS (days): 7  Geometric Mean LOS (GMLOS) (days): 4 80  Days to GMLOS:-1 6     OBJECTIVE:  Risk of Unplanned Readmission Score: 21         Current admission status: Inpatient   Preferred Pharmacy:   Aurelio Maya, [address] [place] [address]  Phone: [phone] Fax: [address] [facility]  [address] [address]  Phone: [phone] Fax: [phone]    Primary Care Provider: Leroy High DO    Primary Insurance: Sabina Kehr [facility]  Secondary Insurance:     DISCHARGE DETAILS:  [address] [place] [address] Number: approved auth# [id number] start date 21 NRD 1/3/22 reviewer: [place] fax clinical review to [phone]

## 2021-12-28 NOTE — ASSESSMENT & PLAN NOTE
Left hip fracture after mechanical fall 11/16 s/p hemiarthroplasty 11/17 with Dr Lam Stallings left knee immobilizer and hip precautions through Dec 29th (6 weeks post-op)  Has been on Lovenox 40 mg daily as outpatient  Weight bearing as tolerated  PT/OT

## 2021-12-28 NOTE — CASE MANAGEMENT
Case Management Discharge Planning Note    Patient name Govind Riddle  Location 2 [address] MRN [id number]  : 1922 Date 2021       Current Admission Date: 2021  Current Admission Diagnosis:Severe sepsis [facility])   Patient Active Problem List    Diagnosis Date Noted    Toxic metabolic encephalopathy     New onset a-fib ([facility] ) 2021    Dysphagia 2021    Hypernatremia 2021    Acute on chronic anemia 2021    Severe sepsis ([facility] ) 2021    Open wound of toe     Paronychia of toe of right foot     Contusion of right great toe with damage to nail     Peripheral artery disease ([facility] )     Pneumonia of both lower lobes 2021    CVA (cerebral vascular accident) ([facility] ) 2021    Leukocytosis 2021    Dementia ([facility] ) 2021    Aortic stenosis 2021    Fall at home 2021    Closed left hip fracture ([facility] ) 2021    Acute respiratory failure with hypoxia ([facility] ) 2019    Acute blood loss anemia 2019    Pneumonia 2019    CAD (coronary artery disease) 2017    Vertigo 2017    Hypertension 2017    Dyslipidemia 2017    Arthritis 2017      LOS (days): 7  Geometric Mean LOS (GMLOS) (days): 4 80  Days to GMLOS:-1 9     OBJECTIVE:  Risk of Unplanned Readmission Score: 21     Current admission status: Inpatient   Preferred Pharmacy:   Melissa Rahman, [address] [address]  Phone: [phone] Fax: [address] [facility] [address]  [address]  [address] [address]  Phone: [phone] Fax: [phone]    Primary Care Provider: Aman Glass DO    Primary Insurance: [facility]ri [facility]  Secondary Insurance:     DISCHARGE DETAILS:    Discharge planning discussed with[de-identified] DaughterBelinda Lien of Choice: Yes  Comments - Freedom of Choice: Hospice care now being considered per Dr Hill Soria  SW met with daughter to discuss plans and hospice agency preference  Per daughter she had already placed call to [facility] so she requested referral be made to same  CM contacted family/caregiver?: Yes  Were Treatment Team discharge recommendations reviewed with patient/caregiver?: Yes  Did patient/caregiver verbalize understanding of patient care needs?: Yes  Were patient/caregiver advised of the risks associated with not following Treatment Team discharge recommendations?: Yes    Contacts  Patient Contacts: Kelsi Miranda  Relationship to Patient[de-identified] Family  Contact Method: In Person  Reason/Outcome: Continuity of Care    Other Referral/Resources/Interventions Provided:  Interventions: Hospice  Referral Comments: Referral made to [facility]  Anticipating hospice evaluation tomorrow morning      Treatment Team Recommendation: Hospice  Discharge Destination Plan[de-identified] Hospice

## 2021-12-28 NOTE — ASSESSMENT & PLAN NOTE
HCAP/aspiration pneumonia  Patient was reportedly being treated for aspiration pneumonia with IV cefepime at [address]  Patient with oropharyngeal dysphagia post CVA in November with recent admission 12/4-12/12 for aspiration pneumonia/acute respiratory failure with hypoxia  Patient received meropenem and vanco in the ED secondary to presumed cefepime failure  Status post antibiotic therapy with Zosyn with initial improvement but worsening again due to recurrent aspiration   Goals of care discussed   Agree with hospice evaluation

## 2021-12-28 NOTE — CASE MANAGEMENT
Case Management Discharge Planning Note    Patient name Kiley Chun  [facility] [address] [facility] MRN [id number]  : 1922 Date 2021       Current Admission Date: 2021  Current Admission Diagnosis:Severe sepsis [facility])   Patient Active Problem List    Diagnosis Date Noted    Toxic metabolic encephalopathy     New onset a-fib ([facility] ) 2021    Dysphagia 2021    Hypernatremia 2021    Acute on chronic anemia 2021    Severe sepsis ([facility] ) 2021    Open wound of toe     Paronychia of toe of right foot     Contusion of right great toe with damage to nail     Peripheral artery disease ([facility] )     Pneumonia of both lower lobes 2021    CVA (cerebral vascular accident) ([facility] ) 2021    Leukocytosis 2021    Dementia ([facility] ) 2021    Aortic stenosis 2021    Fall at home 2021    Closed left hip fracture ([facility] ) 2021    Acute respiratory failure with hypoxia ([facility] ) 2019    Acute blood loss anemia 2019    Pneumonia 2019    CAD (coronary artery disease) 2017    Vertigo 2017    Hypertension 2017    Dyslipidemia 2017    Arthritis 2017      LOS (days): 7  Geometric Mean LOS (GMLOS) (days): 4 80  Days to GMLOS:-1 6     OBJECTIVE:  Risk of Unplanned Readmission Score: 21     Current admission status: Inpatient   Preferred Pharmacy:   Khari Jovel, [address] [address]  Phone: [phone] Fax: [address]  [address] [facility] 100  [address]  [address] [address]  Phone: [phone] Fax: [phone]    Primary Care Provider: Daina Victor DO    [facility]  Secondary Insurance:     DISCHARGE DETAILS:    Treatment Team Recommendation: Short Term Rehab  Discharge Destination Plan[de-identified] Short Term Rehab ([facility])  Transport at Discharge : [facility]  Dispatcher Contacted: Yes  Number/Name of Dispatcher: Burt Jha by Darrint and Unit #): Scott  ETA of Transport (Date): 12/28/21  ETA of Transport (Time): [address] Name, [address] : [address] [facility], [employer]  Receiving Facility/Agency Phone Number: [phone]

## 2021-12-28 NOTE — UTILIZATION REVIEW
Continued Stay Review    Date: 12/26/21                          Current Patient Class: inpatient  Current Level of Care: med surg  HPI:89 y o  female initially admitted on 12/21/21  Assessment/Plan:   HCAP/aspiration pneumonia  O2 requirements currently @ 3ltr nc  Persistent ACEVES, coarse breath sounds noted  WBC trending downward  Vital Signs:   BP (!) 175/102   Pulse 101   Temp (!) 97 3 °F (36 3 °C)   Resp 20   Ht 5' 3" (1 6 m)   Wt 67 8 kg (149 lb 8 oz)   SpO2 98%   BMI 26 48 kg/m²     Pertinent Labs/Diagnostic Results:   Results from last 7 days   Lab Units 12/21/21 2111   SARS-COV-2  Negative     Results from last 7 days   Lab Units 12/28/21  0709 12/27/21  0541 12/26/21  0617 12/25/21  0540 12/25/21  0540 12/24/21  0505 12/24/21  0505 12/23/21  0450 12/23/21  0450 12/22/21  0530 12/21/21 2111   WBC Thousand/uL 24 83* 15 49* 14 95*   < > 18 86*   < > 20 52*   < > 21 07* 35 69*   < >   HEMOGLOBIN g/dL 9 6* 8 4* 8 7*  --  8 6*  --  9 1*   < > 8 6* 9 6*   < >   HEMATOCRIT % 33 0* 27 2* 28 1*  --  29 1*  --  30 1*   < > 27 6* 30 2*   < >   PLATELETS Thousands/uL 205 208 223   < > 225   < > 256   < > 233 244   < >   NEUTROS ABS Thousands/µL  --  12 04*  --   --   --   --   --   --   --  30 32*  --    BANDS PCT % 1  --   --   --   --   --  4  --  3  --    < >    < > = values in this interval not displayed       Results from last 7 days   Lab Units 12/28/21  0709 12/27/21  0541 12/24/21  0506 12/23/21  0450 12/22/21  1458 12/22/21  0530 12/22/21  0530 12/21/21  2111 12/21/21 2111   SODIUM mmol/L 151* 146* 143 142 142   < > 137   < > 137   POTASSIUM mmol/L 3 1* 2 5* 3 5 3 8 3 3*   < > 3 3*   < > 3 4*   CHLORIDE mmol/L 113* 110* 105 107 105   < > 103   < > 101   CO2 mmol/L 27 26 24 26 27   < > 23   < > 26   ANION GAP mmol/L 11 10 14* 9 10   < > 11   < > 10   BUN mg/dL 11 10 17 18 21   < > 20   < > 19   CREATININE mg/dL 0 93 0 83 1 09 1 15 1 12   < > 1 37*   < > 1 28   EGFR ml/min/1 73sq m 51 58 42 39 40   < > 32   < > 34   CALCIUM mg/dL 8 3 7 8* 8 0* 8 1* 7 8*   < > 7 5*   < > 8 2*   MAGNESIUM mg/dL  --  2 0  --  2 1  --   --  2 1  --  1 7   PHOSPHORUS mg/dL  --   --   --   --   --   --  3 3  --   --     < > = values in this interval not displayed  Results from last 7 days   Lab Units 12/21/21 2111   AST U/L 37   ALT U/L 22   ALK PHOS U/L 99   TOTAL PROTEIN g/dL 5 9*   ALBUMIN g/dL 2 0*   TOTAL BILIRUBIN mg/dL 0 46     Results from last 7 days   Lab Units 12/28/21  0709 12/27/21  0541 12/24/21  0506 12/23/21  0450 12/22/21  1458 12/22/21  0530 12/21/21 2111   GLUCOSE RANDOM mg/dL 116 94 50* 70 91 118 160*     Results from last 7 days   Lab Units 12/22/21  0201   PH ART  7 468*   PCO2 ART mm Hg 27 9*   PO2 ART mm Hg 119 2   HCO3 ART mmol/L 19 8*   BASE EXC ART mmol/L -2 8   O2 CONTENT ART mL/dL 16 3   O2 HGB, ARTERIAL % 98 2*   ABG SOURCE  Radial, Right     Results from last 7 days   Lab Units 12/22/21  0530 12/22/21  0127 12/21/21 2111   HS TNI 0HR ng/L  --   --  16   HS TNI 2HR ng/L  --  23  --    HSTNI D2 ng/L  --  7  --    HS TNI 4HR ng/L 40  --   --    HSTNI D4 ng/L 24  --   --      Results from last 7 days   Lab Units 12/23/21  0225 12/22/21  1823 12/22/21  1115 12/22/21  0530 12/22/21  0530 12/21/21 2111 12/21/21 2111   PROTIME seconds  --   --   --   --  15 9*  --  14 6*   INR   --   --   --   --  1 30*  --  1 16   PTT seconds 47* 125* 94*   < > 46*   < > 31    < > = values in this interval not displayed       Results from last 7 days   Lab Units 12/27/21  0541 12/26/21  0617 12/23/21  0450 12/22/21  0652   PROCALCITONIN ng/ml 0 88* 1 49* 9 37* 9 74*     Results from last 7 days   Lab Units 12/22/21  0652 12/22/21  0358 12/22/21  0126 12/21/21  2111   LACTIC ACID mmol/L 3 5* 4 4* 4 3* 3 4*     Results from last 7 days   Lab Units 12/21/21  2111   NT-PRO BNP pg/mL 3,249*     Results from last 7 days   Lab Units 12/21/21  2111   INFLUENZA A PCR  Negative   INFLUENZA B PCR  Negative   RSV PCR  Negative Results from last 7 days   Lab Units 12/21/21  2111   BLOOD CULTURE  No Growth After 5 Days  No Growth After 5 Days  Medications:   Scheduled Medications:  enoxaparin, 40 mg, Subcutaneous, Daily  levalbuterol, 1 25 mg, Nebulization, TID  piperacillin-tazobactam, 3 375 g, Intravenous, Q6H  sodium chloride, 3 mL, Nebulization, TID    Continuous IV Infusions:  multi-electrolyte, 60 mL/hr, Intravenous, Continuous    PRN Meds:  acetaminophen, 650 mg, Rectal, Q6H PRN    Discharge Plan: [facility]    Network Utilization Review Department  ATTENTION: Please call with any questions or concerns to [phone] and carefully listen to the prompts so that you are directed to the right person  All voicemails are confidential   [place] all requests for admission clinical reviews, approved or denied determinations and any other requests to dedicated fax number below belonging to the campus where the patient is receiving treatment   List of dedicated fax numbers for the Facilities:  [address] DENIALS (Administrative/Medical Necessity) [phone]   [address] (Maternity/NICU/Pediatrics) [phone]   [address] [place] [address] [address] [place] [address]  [address] [facility] [place] [address] [address] [address] [address] [facility] [phone]

## 2021-12-28 NOTE — PLAN OF CARE
Problem: RESPIRATORY - ADULT  Goal: Achieves optimal ventilation and oxygenation  Description: INTERVENTIONS:  - Assess for changes in respiratory status  - Assess for changes in mentation and behavior  - Position to facilitate oxygenation and minimize respiratory effort  - Oxygen administered by appropriate delivery if ordered  - Initiate smoking cessation education as indicated  - Encourage broncho-pulmonary hygiene including cough, deep breathe, Incentive Spirometry  - Assess the need for suctioning and aspirate as needed  - Assess and instruct to report SOB or any respiratory difficulty  - Respiratory Therapy support as indicated  Outcome: Progressing     Problem: CARDIOVASCULAR - ADULT  Goal: Maintains optimal cardiac output and hemodynamic stability  Description: INTERVENTIONS:  - Monitor I/O, vital signs and rhythm  - Monitor for S/S and trends of decreased cardiac output  - Administer and titrate ordered vasoactive medications to optimize hemodynamic stability  - Assess quality of pulses, skin color and temperature  - Assess for signs of decreased coronary artery perfusion  - Instruct patient to report change in severity of symptoms  Outcome: Progressing  Goal: Absence of cardiac dysrhythmias or at baseline rhythm  Description: INTERVENTIONS:  - Continuous cardiac monitoring, vital signs, obtain 12 lead EKG if ordered  - Administer antiarrhythmic and heart rate control medications as ordered  - Monitor electrolytes and administer replacement therapy as ordered  Outcome: Progressing     Problem: METABOLIC, FLUID AND ELECTROLYTES - ADULT  Goal: Electrolytes maintained within normal limits  Description: INTERVENTIONS:  - Monitor labs and assess patient for signs and symptoms of electrolyte imbalances  - Administer electrolyte replacement as ordered  - Monitor response to electrolyte replacements, including repeat lab results as appropriate  - Instruct patient on fluid and nutrition as appropriate  Outcome: Progressing  Goal: Fluid balance maintained  Description: INTERVENTIONS:  - Monitor labs   - Monitor I/O and WT  - Instruct patient on fluid and nutrition as appropriate  - Assess for signs & symptoms of volume excess or deficit  Outcome: Progressing     Problem: SKIN/TISSUE INTEGRITY - ADULT  Goal: Skin Integrity remains intact(Skin Breakdown Prevention)  Description: Assess:  -Perform Reji assessment every shift  -Clean and moisturize skin every shift  -Inspect skin when repositioning, toileting, and assisting with ADLS  -Assess under medical devices  -Assess extremities for adequate circulation and sensation     Bed Management:  -Have minimal linens on bed & keep smooth, unwrinkled  -Change linens as needed when moist or perspiring  -Avoid sitting or lying in one position for more than 2 hours while in bed  -Keep HOB at 30 degrees     Toileting:  -Offer bedside commode  -Assess for incontinence every shift  -Use incontinent care products after each incontinent episode    Activity:  -Mobilize patient 3 times a day  -Encourage activity and walks on unit  -Encourage or provide ROM exercises   -Turn and reposition patient every 2 Hours  -Use appropriate equipment to lift or move patient in bed  -Instruct/ Assist with weight shifting when out of bed in chair  -Consider limitation of chair time 2 hour intervals    Skin Care:  -Avoid use of baby powder, tape, friction and shearing, hot water or constrictive clothing  -Relieve pressure over bony prominences  -Do not massage red bony areas    Next Steps:  -Teach patient strategies to minimize risks   -Consider consults to  interdisciplinary teams  Outcome: Progressing  Goal: Incision(s), wounds(s) or drain site(s) healing without S/S of infection  Description: INTERVENTIONS  - Assess and document dressing, incision, wound bed, drain sites and surrounding tissue  - Provide patient and family education  - Perform skin care/dressing changes every shift  Outcome: Progressing  Goal: Pressure injury heals and does not worsen  Description: Interventions:  - Implement low air loss mattress or specialty surface (Criteria met)  - Apply silicone foam dressing  - Instruct/assist with weight shifting every 30 minutes when in chair   - Limit chair time to 2 hour intervals  - Use special pressure reducing interventions when in chair   - Apply fecal or urinary incontinence containment device   - Perform passive or active ROM every shift  - Turn and reposition patient & offload bony prominences every 2 hours   - Utilize friction reducing device or surface for transfers   - Consider consults to  interdisciplinary teams   - Use incontinent care products after each incontinent episode   - Consider nutrition services referral as needed  Outcome: Progressing     Problem: Nutrition/Hydration-ADULT  Goal: Nutrient/Hydration intake appropriate for improving, restoring or maintaining nutritional needs  Description: Monitor and assess patient's nutrition/hydration status for malnutrition  Collaborate with interdisciplinary team and initiate plan and interventions as ordered  Monitor patient's weight and dietary intake as ordered or per policy  Utilize nutrition screening tool and intervene as necessary  Determine patient's food preferences and provide high-protein, high-caloric foods as appropriate       INTERVENTIONS:  - Monitor oral intake, urinary output, labs, and treatment plans  - Assess nutrition and hydration status and recommend course of action  - Evaluate amount of meals eaten  - Assist patient with eating if necessary   - Allow adequate time for meals  - Recommend/ encourage appropriate diets, oral nutritional supplements, and vitamin/mineral supplements  - Assess need for intravenous fluids  - Provide specific nutrition/hydration education as appropriate  - Include patient/family/caregiver in decisions related to nutrition  Outcome: Progressing     Problem: PAIN - ADULT  Goal: Verbalizes/displays adequate comfort level or baseline comfort level  Description: Interventions:  - Encourage patient to monitor pain and request assistance  - Assess pain using appropriate pain scale  - Administer analgesics based on type and severity of pain and evaluate response  - Implement non-pharmacological measures as appropriate and evaluate response  - Consider cultural and social influences on pain and pain management  - Notify physician/advanced practitioner if interventions unsuccessful or patient reports new pain  Outcome: Progressing     Problem: INFECTION - ADULT  Goal: Absence or prevention of progression during hospitalization  Description: INTERVENTIONS:  - Assess and monitor for signs and symptoms of infection  - Monitor lab/diagnostic results  - Monitor all insertion sites, i e  indwelling lines, tubes, and drains  - Monitor endotracheal if appropriate and nasal secretions for changes in amount and color  - [place] appropriate cooling/warming therapies per order  - Administer medications as ordered  - Instruct and encourage patient and family to use good hand hygiene technique  - Identify and instruct in appropriate isolation precautions for identified infection/condition  Outcome: Progressing     Problem: DISCHARGE PLANNING  Goal: Discharge to home or other facility with appropriate resources  Description: INTERVENTIONS:  - Identify barriers to discharge w/patient and caregiver  - Arrange for needed discharge resources and transportation as appropriate  - Identify discharge learning needs (meds, wound care, etc )  - Arrange for interpretive services to assist at discharge as needed  - Refer to Case Management Department for coordinating discharge planning if the patient needs post-hospital services based on physician/advanced practitioner order or complex needs related to functional status, cognitive ability, or social support system  Outcome: Progressing     Problem: MOBILITY - ADULT  Goal: Maintain or return to baseline ADL function  Description: INTERVENTIONS:  -  Assess patient's ability to carry out ADLs; assess patient's baseline for ADL function and identify physical deficits which impact ability to perform ADLs (bathing, care of mouth/teeth, toileting, grooming, dressing, etc )  - Assess/evaluate cause of self-care deficits   - Assess range of motion  - Assess patient's mobility; develop plan if impaired  - Assess patient's need for assistive devices and provide as appropriate  - Encourage maximum independence but intervene and supervise when necessary  - Involve family in performance of ADLs  - Assess for home care needs following discharge   - Consider OT consult to assist with ADL evaluation and planning for discharge  - Provide patient education as appropriate  Outcome: Progressing     Problem: Prexisting or High Potential for Compromised Skin Integrity  Goal: Skin integrity is maintained or improved  Description: INTERVENTIONS:  - Identify patients at risk for skin breakdown  - Assess and monitor skin integrity  - Assess and monitor nutrition and hydration status  - Monitor labs   - Assess for incontinence   - Turn and reposition patient  - Assist with mobility/ambulation  - Relieve pressure over bony prominences  - Avoid friction and shearing  - Provide appropriate hygiene as needed including keeping skin clean and dry  - Evaluate need for skin moisturizer/barrier cream  - Collaborate with interdisciplinary team   - Patient/family teaching  - Consider wound care consult   Outcome: Progressing     Problem: Potential for Falls  Goal: Patient will remain free of falls  Description: INTERVENTIONS:  - Educate patient/family on patient safety including physical limitations  - Instruct patient to call for assistance with activity   - Consult OT/PT to assist with strengthening/mobility   - Keep Call bell within reach  - Keep bed low and locked with side rails adjusted as appropriate  - Keep care items and personal belongings within reach  - Initiate and maintain comfort rounds  - Make Fall Risk Sign visible to staff  - Offer Toileting every 2 Hours, in advance of need  - Initiate/Maintain bed alarm  - Obtain necessary fall risk management equipment  - Apply yellow socks and bracelet for high fall risk patients  - Consider moving patient to room near nurses station  Outcome: Progressing

## 2021-12-28 NOTE — ASSESSMENT & PLAN NOTE
Patient was noted to be in severe respiratory distress on admission with O2 sat 83% on 100% non-rebreather  Placed on BiPAP on admission, was unable to tolerate BiPAP settings was therefore placed on CPAP initially  CXR with dense infiltrates in RML/RLL and hazy opacification of LLL  Not a good candidate for NIPPV due to mental status and-aspiration   Continue supplemental oxygen as needed  Aspiration precaution   Frequent suction  Will change patient to NPO 12/29 given inability to swallow on morning rounds and requiring suction after every spoonful  Will call to update family  Continue Dextrose 5% 75cc/hr

## 2021-12-28 NOTE — PLAN OF CARE
Problem: RESPIRATORY - ADULT  Goal: Achieves optimal ventilation and oxygenation  Description: INTERVENTIONS:  - Assess for changes in respiratory status  - Assess for changes in mentation and behavior  - Position to facilitate oxygenation and minimize respiratory effort  - Oxygen administered by appropriate delivery if ordered  - Initiate smoking cessation education as indicated  - Encourage broncho-pulmonary hygiene including cough, deep breathe, Incentive Spirometry  - Assess the need for suctioning and aspirate as needed  - Assess and instruct to report SOB or any respiratory difficulty  - Respiratory Therapy support as indicated  Outcome: Progressing     Problem: CARDIOVASCULAR - ADULT  Goal: Maintains optimal cardiac output and hemodynamic stability  Description: INTERVENTIONS:  - Monitor I/O, vital signs and rhythm  - Monitor for S/S and trends of decreased cardiac output  - Administer and titrate ordered vasoactive medications to optimize hemodynamic stability  - Assess quality of pulses, skin color and temperature  - Assess for signs of decreased coronary artery perfusion  - Instruct patient to report change in severity of symptoms  Outcome: Progressing  Goal: Absence of cardiac dysrhythmias or at baseline rhythm  Description: INTERVENTIONS:  - Continuous cardiac monitoring, vital signs, obtain 12 lead EKG if ordered  - Administer antiarrhythmic and heart rate control medications as ordered  - Monitor electrolytes and administer replacement therapy as ordered  Outcome: Progressing

## 2021-12-29 PROBLEM — Z51.5 HOSPICE CARE: Status: ACTIVE | Noted: 2021-01-01

## 2021-12-29 NOTE — PLAN OF CARE
Problem: Nutrition/Hydration-ADULT  Goal: Nutrient/Hydration intake appropriate for improving, restoring or maintaining nutritional needs  Description: Monitor and assess patient's nutrition/hydration status for malnutrition  Collaborate with interdisciplinary team and initiate plan and interventions as ordered  Monitor patient's weight and dietary intake as ordered or per policy  Utilize nutrition screening tool and intervene as necessary  Determine patient's food preferences and provide high-protein, high-caloric foods as appropriate       INTERVENTIONS:  - Monitor oral intake, urinary output, labs, and treatment plans  - Assess nutrition and hydration status and recommend course of action  - Evaluate amount of meals eaten  - Assist patient with eating if necessary   - Allow adequate time for meals  - Recommend/ encourage appropriate diets, oral nutritional supplements, and vitamin/mineral supplements  - Assess need for intravenous fluids  - Provide specific nutrition/hydration education as appropriate  - Include patient/family/caregiver in decisions related to nutrition  Outcome: Progressing     Problem: CARDIOVASCULAR - ADULT  Goal: Maintains optimal cardiac output and hemodynamic stability  Description: INTERVENTIONS:  - Monitor I/O, vital signs and rhythm  - Monitor for S/S and trends of decreased cardiac output  - Administer and titrate ordered vasoactive medications to optimize hemodynamic stability  - Assess quality of pulses, skin color and temperature  - Assess for signs of decreased coronary artery perfusion  - Instruct patient to report change in severity of symptoms  Outcome: Progressing

## 2021-12-29 NOTE — CASE MANAGEMENT
Case Management Progress Note    Patient name Irais Dixon  Location 2 [address] 210/2 [address] 210 MRN [id number]  : 1922 Date 2021       LOS (days): 8  Geometric Mean LOS (GMLOS) (days): 4 80  Days to GMLOS:-2 8        OBJECTIVE:        Current admission status: Inpatient  Preferred Pharmacy:   [facility] , [address] [address]  Phone: [phone] Fax: [phone] [address]  [address], [address]  [address] [place] [address]  Phone: [phone] Fax: [phone]    Primary Care Provider: Cecelia Cuenca DO    Primary Insurance: Devra Goldmann [facility]  Secondary Insurance:     PROGRESS NOTE:    Pt was evaluated by [facility] nurse today and found to be candidate for inpatient hospice care  Pt will be transitioned to [facility] today

## 2021-12-29 NOTE — QUICK NOTE
Patient's son was called and updated about her status on Hospice Comfort Measures  He will be contacting his sister/ daughter of patient to update her as well

## 2021-12-29 NOTE — DISCHARGE SUMMARY
[facility]charge Summary - [facility] Internal Medicine    Patient Information: Rito Alpers 80 y o  female MRN: [id number]  Unit/Bed#: [address] [place] [address] Encounter: [id number]    Discharging Physician / Practitioner: Dr Leverette Gaucher    PCP: Alex Henson DO  Admission Date: 12/21/2021  Discharge Date: 12/29/21 to Inpatient Hospice    Reason for Admission: Decreased Oxygen Level (Patient comes via EMS from [address] [place] [address] for low oxygen level with O2 devices not successful)      Discharge Diagnoses:     Principal Problem:    Severe sepsis ([facility] )  Active Problems:    Hypertension    Dyslipidemia    CAD (coronary artery disease)    Pneumonia    Acute respiratory failure with hypoxia ([facility] )    Dementia ([facility] )    Aortic stenosis    Closed left [facility] fracture ([facility] )    CVA (cerebral vascular accident) ([facility] )    Peripheral artery disease ([facility] )    Dysphagia    Toxic metabolic encephalopathy    New onset a-fib ([facility] )  Resolved Problems:    * No resolved hospital problems  *    Transitioned to [address] [place] [address] onset a-fib [facility])  Assessment & Plan  Off therapeutic anticoagulation  Heart rate improved      Toxic metabolic encephalopathy  Assessment & Plan  Neurochecks    Dysphagia  Assessment & Plan  Began post CVA 11/19 12/7 witnessed aspiration event  VBS 12/9 with oropharyngeal dysphagia  Discharged to facility on puree diet with honey thick liquids  Was being treated for aspiration pneumonia at facility prior to admission  12/29 changed to NPO after does not seem to tolerate even puree diet with honey thick liquids requiring suction after every spoonful of feed       Peripheral artery disease [facility])  Assessment & Plan  ASA/Plavix on hold     CVA (cerebral vascular accident) [facility])  Assessment & Plan  Aspirin Plavix on hold  Continue dysphagia diet and aspiration precautions    Closed left hip fracture [facility])  Assessment & Plan  Left hip fracture after mechanical fall 11/16 s/p hemiarthroplasty 11/17 with Dr Yenifer Kennedy left knee immobilizer and hip precautions through Dec 29th (6 weeks post-op)  Has been on Lovenox 40 mg daily as outpatient  Weight bearing as tolerated  PT/OT    Aortic stenosis  Assessment & Plan  Echo 11/2021 with mild AS, mild AI, preserved EF    Dementia ([address] )  Assessment & Plan  Supportive care    Acute respiratory failure with hypoxia [facility])  Assessment & Plan  Patient was noted to be in severe respiratory distress on admission with O2 sat 83% on 100% non-rebreather  Placed on BiPAP on admission, was unable to tolerate BiPAP settings was therefore placed on CPAP initially  CXR with dense infiltrates in RML/RLL and hazy opacification of LLL  Not a good candidate for NIPPV due to mental status and-aspiration   Continue supplemental oxygen as needed  Aspiration precaution   Frequent suction  Will change patient to NPO 12/29 given inability to swallow on morning rounds and requiring suction after every spoonful  Will call to update family  Continue Dextrose 5% 75cc/hr  Pneumonia  Assessment & Plan  HCAP/aspiration pneumonia  Patient was reportedly being treated for aspiration pneumonia with IV cefepime at [address]  Patient with oropharyngeal dysphagia post CVA in November with recent admission 12/4-12/12 for aspiration pneumonia/acute respiratory failure with hypoxia  Patient received meropenem and vanco in the ED secondary to presumed cefepime failure  Status post antibiotic therapy with Zosyn with initial improvement but worsening again due to recurrent aspiration   Goals of care discussed   Agree with hospice evaluation    CAD (coronary artery disease)  Assessment & Plan  History of PCI   Hold ASA, statin, and Plavix for now    Dyslipidemia  Assessment & Plan  Hold atorvastatin    Hypertension  Assessment & Plan  Hold home losartan    * Severe sepsis [facility])  Assessment & Plan  POA as evidenced by tachycardia, tachypnea, and leukocytosis with bandemia  Lactic acid 3 4    Source HCAP/aspiration pneumonia, recurrent  BC x 2 negative  Procalcitonin elevated initially, down treated with IV antibiotics  Noted to have ongoing aspirations with worsening of leukocytosis           Consultations During Hospital Stay:  IP CONSULT TO CASE MANAGEMENT  IP CONSULT TO PHARMACY  IP CONSULT TO HOSPICE  IP CONSULT TO HOSPICE    Procedures Performed:     · NA    Significant Findings:     · Refer to hospital course and above listed diagnosis related plan for details    Imaging while in hospital:    XR chest 1 view portable    Result Date: 12/22/2021  Narrative: CHEST INDICATION:   sob  COMPARISON:  12/12/2021 EXAM PERFORMED/VIEWS:  XR CHEST PORTABLE FINDINGS: Stable cardiomediastinal structures  Patchy perihilar airspace disease within the right midlung field  Bibasilar opacities noted  Small bilateral pleural effusions are seen within the lateral lung bases  The upper lung fields are clear  No pneumothorax  Stable bony structures with no acute osseous abnormality  Impression: Bilateral airspace disease within the mid to lower lung field on the right and within the left lung base with bibasilar effusions  Findings are suggestive of multifocal pneumonia and consistent with the preliminary report provided by the emergency department  Workstation performed: [id number]     XR chest portable    Result Date: 12/14/2021  Narrative: CHEST INDICATION:   pneumonia  COMPARISON:  Chest radiograph 12/7/2021  EXAM PERFORMED/VIEWS:  XR CHEST PORTABLE FINDINGS: Cardiomediastinal silhouette appears unremarkable  Mild pulmonary edema and bibasilar airspace opacities, similar to 12/7/2021  Small bilateral pleural effusions  No pneumothorax  Osseous structures appear within normal limits for patient age  Impression: Stable mild pulmonary edema, bibasilar airspace opacities and small bilateral pleural effusions compared to 12/7/2021   Workstation performed: [id number]     XR chest portable    Result Date: 12/8/2021  Narrative: CHEST INDICATION:   R side PNA  COMPARISON:  Compared with 12/3/2021 EXAM PERFORMED/VIEWS:  XR CHEST PORTABLE FINDINGS: Cardiomediastinal silhouette appears unremarkable  Prominent hilar vascular markings with patchy interstitial changes in the right lower lung with slight worsening  Similar changes in the left lower lung  New blunted costophrenic angles bilaterally  No pneumothorax   Osseous structures appear within normal limits for patient age  Impression: Congestive changes are new  Worsening right and new left lower lobe infiltrates with small effusions  Workstation performed: [id number]     XR chest 1 view portable    Result Date: 12/4/2021  Narrative: CHEST INDICATION:   resp distress  COMPARISON:  11/16/2021 EXAM PERFORMED/VIEWS:  XR CHEST PORTABLE  AP semierect Images:  1 FINDINGS: Cardiomediastinal silhouette appears unremarkable  Right middle lobe pneumonia  Trace left pleural effusion  No pneumothorax  Osseous structures appear within normal limits for patient age  Impression: Right middle lobe pneumonia  Trace left pleural effusion  Workstation performed: [id number]     XR hip/pelv 1 vw left if performed    Result Date: 12/9/2021  Narrative: LEFT HIP INDICATION:   s/p hemiarthroplasty  COMPARISON:  12/01/2021 VIEWS:  XR HIP/PELV 1 VW LEFT W PELVIS IF PERFORMED 1 image FINDINGS: Left hip hemiarthroplasty in good position  No significant hip degenerative changes  No lytic or blastic osseous lesion  Metallic staples in the soft tissues  Degenerative changes in the lower lumbar spine  Impression: Unremarkable left hip hemiarthroplasty  Workstation performed: [id number]     XR hip/pelv 1 vw left if performed    Result Date: 12/2/2021  Narrative: LEFT HIP INDICATION:   post reduction  COMPARISON:  Earlier the same day VIEWS:  XR HIP/PELV 1 VW LEFT W PELVIS IF PERFORMED Images: 2 FINDINGS: Left hip hemiarthroplasty has now been reduced  There is no fracture  Severe right hip hip degenerative changes  No lytic or blastic osseous lesion  Metallic staples in the soft tissues lateral to the left hip  Severe degenerative changes in the lower lumbar spine  Impression: Left hip hemiarthroplasty now in good position  Severe degenerative changes in the right hip  Workstation performed: [id number]     XR hip/pelv 2-3 vws left    Result Date: 12/2/2021  Narrative: LEFT HIP INDICATION:   disloc  COMPARISON:  11/17/2021 VIEWS:  XR HIP/PELV 2-3 VWS LEFT  W PELVIS IF PERFORMED Images: 2 FINDINGS: A left hip hemiarthroplasty is present  The arthroplasty is dislocated superiorly and laterally with respect to the left acetabulum  Severe degenerative changes in the right hip  No lytic or blastic osseous lesion  Soft tissues are unremarkable  Degenerative changes in the lower lumbar spine  Impression: Dislocated left hip hemiarthroplasty  Workstation performed: [id number]     CT head without contrast    Result Date: 12/4/2021  Narrative: CT BRAIN - WITHOUT CONTRAST INDICATION:   Altered mental status  COMPARISON:  CT of the head on November 19, 2021  MRI of the brain on November 19, 2021  TECHNIQUE:  CT examination of the brain was performed  In addition to axial images, sagittal and coronal 2D reformatted images were created and submitted for interpretation  Radiation dose length product (DLP) for this visit:  936 mGy-cm   This examination, like all CT scans performed in the [facility], was performed utilizing techniques to minimize radiation dose exposure, including the use of iterative reconstruction and automated exposure control  IMAGE QUALITY:  Diagnostic  FINDINGS: PARENCHYMA: Decreased attenuation is noted in periventricular and subcortical white matter demonstrating an appearance that is statistically most likely to represent moderate microangiopathic change  Demarcation of previously seen small infarction in the right medial temporal lobe  (Series 2, image 18)    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 new since prior exam  No CT signs of acute infarction  No intracranial mass, mass effect or midline shift  No acute parenchymal hemorrhage  VENTRICLES AND EXTRA-AXIAL SPACES:  Normal for the patient's age  VISUALIZED ORBITS AND PARANASAL SINUSES:  Bilateral ocular lens replacements  CALVARIUM AND EXTRACRANIAL SOFT TISSUES:  Normal      Impression: 1  No acute intracranial hemorrhage, midline shift, or mass effect  2   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 is new since prior exam  Workstation performed: [id number]     FL barium swallow video w speech    Result Date: 12/9/2021  Narrative: A video barium swallow study was performed by the Department of Speech Pathology  Please refer to the report for the official interpretation  The images are stored for archival purposes only  Study images were not formally reviewed by the Radiology Department  Incidental Findings:   · NA     Test Results Pending at Discharge (will require follow up):   · As per After Visit Summary     Outpatient Tests Requested:  · NA    Complications:  Refer to hospital course and above listed diagnosis related plan, if any    Hospital Course: Laila Dickerson is a 80 y o  female patient who originally presented to the hospital on 12/21/2021 who was admitted with severe sepsis and acute hypoxic respiratory failure secondary to HCAP aspiration pneumonia and admitted to ICU initially  She was placed on BiPap which she did not tolerate well and then placed on CPAP  Due to being treated for aspiration pneumonia with IV cefepime in nursing home prior to arrival to ED with suspected treatment failure, patient received IV meropenem and Vancomycin in ED  CXR showed dense infiltrates in RML/RLL and hazy opacification LLL  She wasn't a good candidate for NIPPV due to mental status and aspiration       She was continued on Zosyn IV with initial improvement in symptoms however subsequently worsened due to recurrent aspiration  Goals of care were discussed with family who were agreeable to transitioning patient to Hospice  Inpatient Hospice was consulted and accepted patient  Patient on day of discharge and readmission to inpatient Hospice converted to NPO given she had continued aspiration in order to make her more comfortable  She was found to have new onset Afib during admission and anticoagulation was held     Please see above list of diagnoses and related plan for additional information  Condition at Discharge: Terminal with transition to Inpatient Hospice  Discharge Day Visit / Exam:     Subjective:  Patient was seen and examined at bedside  Nurse aid was attempting to feed patient puree diet however she was making gurgling sounds and having to be suctioned in between every spoonful of feed  Did not seem to be able to swallow  Patient is alert and oriented to place not time  She denies chest pain, shortness of breath, abdominal pain, dysuria  Vitals: Blood Pressure: 157/75 (12/29/21 1056)  Pulse: 76 (12/29/21 1056)  Temperature: (!) 96 3 °F (35 7 °C) (12/29/21 0805)  Temp Source: Tympanic (12/29/21 0805)  Respirations: 19 (12/29/21 0805)  Height: 5' 3" (160 cm) (12/21/21 2057)  Weight - Scale: 67 7 kg (149 lb 4 oz) (12/29/21 0600)  SpO2: (!) 89 % (12/29/21 1056)  Exam:   Physical Exam  Physical Exam  Constitutional:       Appearance: Normal appearance  Cardiovascular:      Rate and Rhythm: Normal rate and regular rhythm  Pulmonary:      Effort: Pulmonary effort is normal       Breath sounds: Wheezing (Slight wheeze left lower lobe  ) present  Comments: Gurgling sound in throat and suctioned by nurse  Abdominal:      General: Bowel sounds are normal  There is distension  Palpations: Abdomen is soft  Tenderness: There is no guarding     Musculoskeletal:      Right lower leg: Edema (3+ bilateral pitting) present  Left lower leg: Edema present  Neurological:      Mental Status: She is alert  Discharge instructions/Information to patient and family:(Discharge Medications and Follow up):   See after visit summary for information provided to patient and family  Provisions for Follow-Up Care:  See after visit summary for information related to follow-up care and any pertinent home health orders  Disposition: Terminal/ Transitioned to Inpatient Hospice  Planned Readmission:  Yes     Discharge Statement:  I spent 45 minutes discharging the patient  This time was spent on the day of discharge  I had direct contact with the patient on the day of discharge  Greater than 50% of the total time was spent examining patient, answering all patient questions, arranging and discussing plan of care with patient as well as directly providing post-discharge instructions  Additional time then spent on discharge activities  Discharge Medications:  See after visit summary for reconciled discharge medications provided to patient and family  ** Please Note: "This note has been constructed using a voice recognition system  Therefore there may be syntax, spelling, and/or grammatical errors   Please call if you have any questions  "**

## 2021-12-29 NOTE — PROGRESS NOTES
[facility] Progress Note  Patient: Gloria Bennett 80 y o  female   MRN: [id number]  PCP: Farooq Wilkins DO  Unit/Bed#: [address] [place] [address] Encounter: [id number]  Date Of Visit: 12/29/21    Problem List:    Principal Problem:    Severe sepsis ([facility] )  Active Problems:    Hypertension    Dyslipidemia    CAD (coronary artery disease)    Pneumonia    Acute respiratory failure with hypoxia ([place] [address] )    Dementia ([place] [address] )    Aortic stenosis    Closed left hip fracture ([place] [address] )    CVA (cerebral vascular accident) ([place] [address] )    Peripheral artery disease ([place] [address] )    Dysphagia    Toxic metabolic encephalopathy    New onset a-fib ([place] [address] )      Assessment & Plan:    New onset a-fib [facility])  Assessment & Plan  Off therapeutic anticoagulation  Heart rate improved      Toxic metabolic encephalopathy  Assessment & Plan  Neurochecks    Dysphagia  Assessment & Plan  Began post CVA 11/19 12/7 witnessed aspiration event  VBS 12/9 with oropharyngeal dysphagia  Discharged to facility on puree diet with honey thick liquids  Was being treated for aspiration pneumonia at facility prior to admission  12/29 changed to NPO after does not seem to tolerate even puree diet with honey thick liquids requiring suction after every spoonful of feed       Peripheral artery disease [facility])  Assessment & Plan  ASA/Plavix on hold     CVA (cerebral vascular accident) ([place] [address] )  Assessment & Plan  Aspirin Plavix on hold  Continue dysphagia diet and aspiration precautions    Closed left hip fracture [facility])  Assessment & Plan  Left hip fracture after mechanical fall 11/16 s/p hemiarthroplasty 11/17 with Dr Janice Marks left knee immobilizer and hip precautions through Dec 29th (6 weeks post-op)  Has been on Lovenox 40 mg daily as outpatient  Weight bearing as tolerated  PT/OT    Aortic stenosis  Assessment & Plan  Echo 11/2021 with mild AS, mild AI, preserved EF    Dementia ([facility] )  Assessment & Plan  Supportive care    Acute respiratory failure with hypoxia ([facility])  Assessment & Plan  Patient was noted to be in severe respiratory distress on admission with O2 sat 83% on 100% non-rebreather  Placed on BiPAP on admission, was unable to tolerate BiPAP settings was therefore placed on CPAP initially  CXR with dense infiltrates in RML/RLL and hazy opacification of LLL  Not a good candidate for NIPPV due to mental status and-aspiration   Continue supplemental oxygen as needed  Aspiration precaution   Frequent suction  Will change patient to NPO 12/29 given inability to swallow on morning rounds and requiring suction after every spoonful  Will call to update family  Continue Dextrose 5% 75cc/hr  Pneumonia  Assessment & Plan  HCAP/aspiration pneumonia  Patient was reportedly being treated for aspiration pneumonia with IV cefepime at [facility]  Patient with oropharyngeal dysphagia post CVA in November with recent admission 12/4-12/12 for aspiration pneumonia/acute respiratory failure with hypoxia  Patient received meropenem and vanco in the ED secondary to presumed cefepime failure  Status post antibiotic therapy with Zosyn with initial improvement but worsening again due to recurrent aspiration   Goals of care discussed   Agree with hospice evaluation    CAD (coronary artery disease)  Assessment & Plan  History of PCI   Hold ASA, statin, and Plavix for now    Dyslipidemia  Assessment & Plan  Hold atorvastatin    Hypertension  Assessment & Plan  Hold home losartan    * Severe sepsis [facility])  Assessment & Plan  POA as evidenced by tachycardia, tachypnea, and leukocytosis with bandemia  Lactic acid 3 4    Source HCAP/aspiration pneumonia, recurrent  BC x 2 negative  Procalcitonin elevated initially, down treated with IV antibiotics  Noted to have ongoing aspirations with worsening of leukocytosis           VTE Pharmacologic Prophylaxis:   Pharmacologic: Enoxaparin (Lovenox)  Mechanical VTE Prophylaxis in Place: Yes    Patient Centered Rounds: I have performed bedside rounds with nursing staff today  Discussions with Specialists or Other Care Team Provider: Dr Kate Bosch     Education and Discussions with Family / Patient: Will call family later today    Time Spent for Care: 30 minutes  More than 50% of total time spent on counseling and coordination of care as described above  Current Length of Stay: 8 day(s)    Current Patient Status: Inpatient   Certification Statement: The patient will continue to require additional inpatient hospital stay due to Inpatient evaluation by Hospice team with recurrent aspiration and inability to swallow following CVA  Discharge Plan: Inpatient vs outpatient Hospice after evaluation by inpatient Hospice  Code Status: Level 3 - DNAR and DNI      Subjective:   Patient was seen and examined at bedside  Nurse aid was attempting to feed patient puree diet however she was making gurgling sounds and having to be suctioned in between every spoonful of feed  Did not seem to be able to swallow  Patient is alert and oriented to place not time  She denies chest pain, shortness of breath, abdominal pain, dysuria  Objective:     Vitals:   Temp (24hrs), Av °F (36 1 °C), Min:96 3 °F (35 7 °C), Max:97 7 °F (36 5 °C)    Temp:  [96 3 °F (35 7 °C)-97 7 °F (36 5 °C)] 96 3 °F (35 7 °C)  HR:  [76-90] 76  Resp:  [18-20] 19  BP: (149-162)/(71-85) 157/75  SpO2:  [89 %-98 %] 89 %  Body mass index is 26 44 kg/m²  Input and Output Summary (last 24 hours): Intake/Output Summary (Last 24 hours) at 2021 1115  Last data filed at 2021 0601  Gross per 24 hour   Intake 900 ml   Output --   Net 900 ml       Physical Exam:     Physical Exam  Constitutional:       Appearance: Normal appearance  Cardiovascular:      Rate and Rhythm: Normal rate and regular rhythm  Pulmonary:      Effort: Pulmonary effort is normal       Breath sounds: Wheezing (Slight wheeze left lower lobe  ) present  Comments: Gurgling sound in throat and suctioned by nurse     Abdominal: General: Bowel sounds are normal  There is distension  Palpations: Abdomen is soft  Tenderness: There is no guarding  Musculoskeletal:      Right lower leg: Edema (3+ bilateral pitting) present  Left lower leg: Edema present  Neurological:      Mental Status: She is alert  Additional Data:     Labs:    Results from last 7 days   Lab Units 12/28/21  0709 12/27/21  0541 12/24/21  0505   WBC Thousand/uL 24 83* 15 49*   < >   HEMOGLOBIN g/dL 9 6* 8 4*   < >   HEMATOCRIT % 33 0* 27 2*   < >   PLATELETS Thousands/uL 205 208   < >   NEUTROS PCT %  --  77*  --    LYMPHS PCT %  --  6*  --    LYMPHO PCT % 5*  --    < >   MONOS PCT %  --  14*  --    MONO PCT % 10  --    < >   EOS PCT % 1 1   < >    < > = values in this interval not displayed  Results from last 7 days   Lab Units 12/28/21  0709   POTASSIUM mmol/L 3 1*   CHLORIDE mmol/L 113*   CO2 mmol/L 27   BUN mg/dL 11   CREATININE mg/dL 0 93   CALCIUM mg/dL 8 3           * I Have Reviewed All Lab Data Listed Above  * Additional Pertinent Lab Tests Reviewed:  All Labs Within Last 24 Hours Reviewed      Imaging:  Imaging Personally Reviewed by Myself Includes:  CXR 12/21    Recent Cultures (last 7 days):           Last 24 Hours Medication List:   Current Facility-Administered Medications   Medication Dose Route Frequency Provider Last Rate    acetaminophen  650 mg Rectal Q6H PRN TAN Otoole      dextrose  75 mL/hr Intravenous Continuous James Mandujano MD 75 mL/hr (12/28/21 2238)    enoxaparin  40 mg Subcutaneous Daily Bradly Meza DO      levalbuterol  1 25 mg Nebulization TID Doraine Meigs, CRNP      potassium chloride  20 mEq Intravenous Once Javad Salcedo MD 20 mEq (12/29/21 1004)    Followed by   Hayley Pall potassium chloride  20 mEq Intravenous Once Javad Salcedo MD      sodium chloride  3 mL Nebulization TID Doraine Meigs, CRNP            Today, Patient Was Seen By: Manoj Morris MD    ** Please Note: "This note has been constructed using a voice recognition system  Therefore there may be syntax, spelling, and/or grammatical errors   Please call if you have any questions  "**

## 2021-12-29 NOTE — H&P
History and Physical - [facility] Internal Medicine    Patient Information: David Vernon 80 y o  female MRN: [id number]  Unit/Bed#: [address] [place] [address] Encounter: [id number]  Admitting Physician: Dr Haleigh Webster  PCP: Ysabel Mccullough DO  Date of Admission:  12/29/21        Hospital Problem List:     Principal Problem:    Hospice care      Assessment/Plan:    * Hospice care  Assessment & Plan  Patient transitioned to inpatient hospice and comfort measures only  Was previously admitted with aspiration pneumonia which was recurrent and patient unable to tolerate any feeds with family agreeable to hospice  She was approved for Inpatient Hospice  VTE Prophylaxis: none, hospice/ comfort measures only  / No Mechanical VTE  Code Status: Level 4 DNR/Comfort Care    Anticipated Length of Stay:  Patient will be admitted on an Hospice basis with an anticipated length of stay of  > 2 midnights  Justification for Hospital Stay: Inpatient Hospice    Total Time for Visit, including Counseling / Coordination of Care: 30 minutes  Greater than 50% of this total time spent on direct patient counseling and coordination of care  Chief Complaint:     None- Patient transitioned to Hospice Comfort measures only  of Present Illness:    David Vernon is a 80 y o  female who was admitted for Inpatient Hospice on 12/29  Hospital course prior to transitioning to Hospice:  David Vernon is a 80 y o  female patient who originally presented to the hospital on 12/21/2021 and was admitted with severe sepsis and acute hypoxic respiratory failure secondary to HCAP aspiration pneumonia and admitted to ICU initially  She was placed on BiPap which she did not tolerate well and then placed on CPAP  Due to being treated for aspiration pneumonia with IV cefepime in nursing home prior to arrival to ED with suspected treatment failure, patient received IV meropenem and Vancomycin in ED     CXR showed dense infiltrates in RML/RLL and hazy opacification LLL  She wasn't a good candidate for NIPPV due to mental status and aspiration       She was continued on Zosyn IV with initial improvement in symptoms however subsequently worsened due to recurrent aspiration  Goals of care were discussed with family who were agreeable to transitioning patient to Hospice  Inpatient Hospice was consulted and accepted patient       Patient on day of discharge and readmission to inpatient Hospice converted to NPO given she had continued aspiration in order to make her more comfortable         Please see above list of diagnoses and related plan for additional information  Review of Systems:    Review of Systems  She denies chest pain, shortness of breath, abdominal pain, dysuria  Past Medical and Surgical History:     Past Medical History:   Diagnosis Date    Abnormal EKG     Aortic valve stenosis     Arthritis     CKD (chronic kidney disease), stage III (HCC)     Coronary artery disease     emergent PCI 11/18/13    Hypertension     Mitral and aortic regurgitation     Myocardial infarction ([facility] [address] ) 11/2013    with 1 stent       Past Surgical History:   Procedure Laterality Date    APPENDECTOMY      BILATERAL OOPHORECTOMY      CORONARY STENT PLACEMENT  2013    after MI (x1 stent)    DILATION AND CURETTAGE OF UTERUS      ESOPHAGUS FOREIGN BODY REMOVAL N/A 1/31/2019    Procedure: REMOVAL FOREIGN BODY ESOPHAGUS;  Surgeon: Amado Scott MD;  Location: [facility] GI LAB; Service: Gastroenterology    JOINT REPLACEMENT Left     knee    MD PARTIAL HIP REPLACEMENT Left 11/17/2021    Procedure: HEMIARTHROPLASTY HIP (BIPOLAR); Surgeon: Mariely Barajas DO;  Location: [address] [place] [address];  Service: Orthopedics    MD XCAPSL CTRC RMVL INSJ IO LENS PROSTH W/O ECP Left 12/19/2016    Procedure: EXTRACTION EXTRACAPSULAR CATARACT PHACO INTRAOCULAR LENS (IOL);   Surgeon: Anayeli Salinas MD;  Location: [facility] MAIN OR;  Service: Ophthalmology    MD XCAPSL CTRC RMVL INSJ IO LENS PROSTH W/O ECP Right 6/18/2018    Procedure: EXTRACTION EXTRACAPSULAR CATARACT PHACO INTRAOCULAR LENS (IOL); Surgeon: Mamta Napoles MD;  Location: [facility] MAIN OR;  Service: Ophthalmology    TONSILLECTOMY         Meds/Allergies:    PTA meds:   Prior to Admission Medications   Prescriptions Last Dose Informant Patient Reported? Taking?   acetaminophen (TYLENOL) 325 mg tablet   No No   Sig: Take 3 tablets (975 mg total) by mouth every 8 (eight) hours   ascorbic acid (VITAMIN C) 250 MG tablet   No No   Sig: Take 1 tablet (250 mg total) by mouth daily   aspirin 81 MG tablet  Self Yes No   Sig: Take 81 mg by mouth daily     atorvastatin (LIPITOR) 80 mg tablet   No No   Sig: Take 1 tablet (80 mg total) by mouth daily with dinner   clopidogrel (Plavix) 75 mg tablet   No No   Sig: Take 1 tablet (75 mg total) by mouth daily   docusate sodium (COLACE) 100 mg capsule   No No   Sig: Take 1 capsule (100 mg total) by mouth 2 (two) times a day   enoxaparin (LOVENOX) 40 mg/0 4 mL   No No   Sig: Inject 0 4 mL (40 mg total) under the skin daily   ferrous sulfate 325 (65 Fe) mg tablet   No No   Sig: Take 1 tablet (325 mg total) by mouth every other day   folic acid (FOLVITE) 1 mg tablet   No No   Sig: Take 1 tablet (1 mg total) by mouth daily   losartan (COZAAR) 100 MG tablet   Yes No   Sig: Take 50 mg by mouth daily   pantoprazole (PROTONIX) 40 mg tablet   No No   Sig: Take 1 tablet (40 mg total) by mouth daily in the early morning   polyethylene glycol (MIRALAX) 17 g packet   No No   Sig: Take 17 g by mouth daily as needed (Constipation)      Facility-Administered Medications: None       Allergies: Allergies   Allergen Reactions    Metoprolol Other (See Comments)     According to previous medical record, you developed bradycardia and pre-syncope in 2015 while on metoprolol ER 25mg, so please DO NOT take metoprolol or other beta blocker       History:     Marital Status: /Civil Union     Substance Use History:   Social History     Substance and Sexual Activity   Alcohol Use Yes    Comment: drinks whiskey 2 x week     Social History     Tobacco Use   Smoking Status Never Smoker   Smokeless Tobacco Never Used     Social History     Substance and Sexual Activity   Drug Use No       Family History:    Family History   Problem Relation Age of Onset    Heart disease Sister         enlarged heart       Physical Exam:     Vitals:   SpO2: 90 % (12/29/21 1420)    Physical Exam  Constitutional:       Appearance: Normal appearance  Cardiovascular:      Rate and Rhythm: Normal rate and regular rhythm  Pulmonary:      Eff[place]: Pulmonary effort is normal       Breath sounds: Wheezing (Slight wheeze left lower lobe  ) present       Comments: Improvement in gurgling sound in throat after being made NPO and suctioning  Abdominal:      General: Bowel sounds are normal  There is distension       Palpations: Abdomen is soft       Tenderness: There is no guarding  Musculoskeletal:      Right lower leg: Edema (3+ bilateral pitting) present       Left lower leg: Edema present  Neurological:      Mental Status: She is alert  Lab Results: I have personally reviewed pertinent reports  Results from last 7 days   Lab Units 12/28/21  0709 12/27/21  0541 12/24/21  0505   WBC Thousand/uL 24 83* 15 49*   < >   HEMOGLOBIN g/dL 9 6* 8 4*   < >   HEMATOCRIT % 33 0* 27 2*   < >   PLATELETS Thousands/uL 205 208   < >   NEUTROS PCT %  --  77*  --    LYMPHS PCT %  --  6*  --    LYMPHO PCT % 5*  --    < >   MONOS PCT %  --  14*  --    MONO PCT % 10  --    < >   EOS PCT % 1 1   < >    < > = values in this interval not displayed  Results from last 7 days   Lab Units 12/28/21  0709   POTASSIUM mmol/L 3 1*   CHLORIDE mmol/L 113*   CO2 mmol/L 27   BUN mg/dL 11   CREATININE mg/dL 0 93   CALCIUM mg/dL 8 3           Imaging: I have personally reviewed pertinent reports  XR chest 1 view portable    Result Date: 12/22/2021  Narrative: CHEST INDICATION:   sob   COMPARISON: 12/12/2021 EXAM PERFORMED/VIEWS:  XR CHEST PORTABLE FINDINGS: Stable cardiomediastinal structures  Patchy perihilar airspace disease within the right midlung field  Bibasilar opacities noted  Small bilateral pleural effusions are seen within the lateral lung bases  The upper lung fields are clear  No pneumothorax  Stable bony structures with no acute osseous abnormality  Impression: Bilateral airspace disease within the mid to lower lung field on the right and within the left lung base with bibasilar effusions  Findings are suggestive of multifocal pneumonia and consistent with the preliminary report provided by the emergency department  Workstation performed: [id number]     XR chest portable    Result Date: 12/14/2021  Narrative: CHEST INDICATION:   pneumonia  COMPARISON:  Chest radiograph 12/7/2021  EXAM PERFORMED/VIEWS:  XR CHEST PORTABLE FINDINGS: Cardiomediastinal silhouette appears unremarkable  Mild pulmonary edema and bibasilar airspace opacities, similar to 12/7/2021  Small bilateral pleural effusions  No pneumothorax  Osseous structures appear within normal limits for patient age  Impression: Stable mild pulmonary edema, bibasilar airspace opacities and small bilateral pleural effusions compared to 12/7/2021  Workstation performed: [id number]     XR chest portable    Result Date: 12/8/2021  Narrative: CHEST INDICATION:   R side PNA  COMPARISON:  Compared with 12/3/2021 EXAM PERFORMED/VIEWS:  XR CHEST PORTABLE FINDINGS: Cardiomediastinal silhouette appears unremarkable  Prominent hilar vascular markings with patchy interstitial changes in the right lower lung with slight worsening  Similar changes in the left lower lung  New blunted costophrenic angles bilaterally  No pneumothorax   Osseous structures appear within normal limits for patient age  Impression: Congestive changes are new  Worsening right and new left lower lobe infiltrates with small effusions   Workstation performed: [id number]     XR chest 1 view portable    Result Date: 12/4/2021  Narrative: CHEST INDICATION:   resp distress  COMPARISON:  11/16/2021 EXAM PERFORMED/VIEWS:  XR CHEST PORTABLE  AP semierect Images:  1 FINDINGS: Cardiomediastinal silhouette appears unremarkable  Right middle lobe pneumonia  Trace left pleural effusion  No pneumothorax  Osseous structures appear within normal limits for patient age  Impression: Right middle lobe pneumonia  Trace left pleural effusion  Workstation performed: [id number]     XR hip/pelv 1 vw left if performed    Result Date: 12/9/2021  Narrative: LEFT HIP INDICATION:   s/p hemiarthroplasty  COMPARISON:  12/01/2021 VIEWS:  XR HIP/PELV 1 VW LEFT W PELVIS IF PERFORMED 1 image FINDINGS: Left hip hemiarthroplasty in good position  No significant hip degenerative changes  No lytic or blastic osseous lesion  Metallic staples in the soft tissues  Degenerative changes in the lower lumbar spine  Impression: Unremarkable left hip hemiarthroplasty  Workstation performed: [id number]     XR hip/pelv 1 vw left if performed    Result Date: 12/2/2021  Narrative: LEFT HIP INDICATION:   post reduction  COMPARISON:  Earlier the same day VIEWS:  XR HIP/PELV 1 VW LEFT W PELVIS IF PERFORMED Images: 2 FINDINGS: Left hip hemiarthroplasty has now been reduced  There is no fracture  Severe right hip hip degenerative changes  No lytic or blastic osseous lesion  Metallic staples in the soft tissues lateral to the left hip  Severe degenerative changes in the lower lumbar spine  Impression: Left hip hemiarthroplasty now in good position  Severe degenerative changes in the right hip  Workstation performed: [id number]     XR hip/pelv 2-3 vws left    Result Date: 12/2/2021  Narrative: LEFT HIP INDICATION:   disloc  COMPARISON:  11/17/2021 VIEWS:  XR HIP/PELV 2-3 VWS LEFT  W PELVIS IF PERFORMED Images: 2 FINDINGS: A left hip hemiarthroplasty is present    The arthroplasty is dislocated superiorly and laterally with respect to the left acetabulum  Severe degenerative changes in the right hip  No lytic or blastic osseous lesion  Soft tissues are unremarkable  Degenerative changes in the lower lumbar spine  Impression: Dislocated left hip hemiarthroplasty  Workstation performed: [id number]     CT head without contrast    Result Date: 12/4/2021  Narrative: CT BRAIN - WITHOUT CONTRAST INDICATION:   Altered mental status  COMPARISON:  CT of the head on November 19, 2021  MRI of the brain on November 19, 2021  TECHNIQUE:  CT examination of the brain was performed  In addition to axial images, sagittal and coronal 2D reformatted images were created and submitted for interpretation  Radiation dose length product (DLP) for this visit:  936 mGy-cm   This examination, like all CT scans performed in the [facility], was performed utilizing techniques to minimize radiation dose exposure, including the use of iterative reconstruction and automated exposure control  IMAGE QUALITY:  Diagnostic  FINDINGS: PARENCHYMA: Decreased attenuation is noted in periventricular and subcortical white matter demonstrating an appearance that is statistically most likely to represent moderate microangiopathic change  Demarcation of previously seen small infarction in the right medial temporal lobe  (Series 2, image 18)  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 new since prior exam  No CT signs of acute infarction  No intracranial mass, mass effect or midline shift  No acute parenchymal hemorrhage  VENTRICLES AND EXTRA-AXIAL SPACES:  Normal for the patient's age  VISUALIZED ORBITS AND PARANASAL SINUSES:  Bilateral ocular lens replacements  CALVARIUM AND EXTRACRANIAL SOFT TISSUES:  Normal      Impression: 1  No acute intracranial hemorrhage, midline shift, or mass effect  2   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 is new since prior exam  Workstation performed: [id number]     FL barium swallow video w speech    Result Date: 12/9/2021  Narrative: A video barium swallow study was performed by the Department of Speech Pathology  Please refer to the report for the official interpretation  The images are stored for archival purposes only  Study images were not formally reviewed by the Radiology Department  No orders to display       Allscripts/EPIC Records Reviewed: Yes     ** Please Note: "This note has been constructed using a voice recognition system  Therefore there may be syntax, spelling, and/or grammatical errors   Please call if you have any questions  "**

## 2021-12-29 NOTE — [place] UNIVERSAL TRANSFER FORM
[place] UNIVERSAL TRANSFER FORM  (ALL ITEMS MUST BE COMPLETED)    1  TRANSFER FROM: [address] [facility]      TRANSFER TO: inpatient hospite    2  DATE OF TRANSFER: 12/29/2021                        TIME OF TRANSFER: 1300    3  PATIENT NAME: Tosha Linda,        YOB: 1922                             GENDER: female    4  LANGUAGE:   English    5  PHYSICIAN NAME:  Fabian Lovett MD                   PHONE: [phone]    6  CODE STATUS: Level 3 - DNAR and DNI        Out of Hospital DNR Attached: No    7  :                                      :  Extended Emergency Contact Information  Primary Emergency Contact: DR Santiago Banks  Mobile Phone: [phone]  Relation: Son  Secondary Emergency Contact: Jhoan Zacarias  Mobile Phone: [phone]  Relation: Daughter           Health Care Representative/Proxy:  No           Legal Guardian:  No             NAME OF:           HEALTH CARE REPRESENTATIVE/PROXY:                                         OR           LEGAL GUARDIAN, IF NOT :                                               PHONE:  (Day)           (Night)                        (Cell)    8  REASON FOR TRANSFER: (Must include brief medical history and recent changes in physical function or cognition ) hospice inpatient            V/S: /75   Pulse 76   Temp (!) 96 3 °F (35 7 °C) (Tympanic)   Resp 19   Ht 5' 3" (1 6 m)   Wt 67 7 kg (149 lb 4 oz)   SpO2 (!) 89%   BMI 26 44 kg/m²           PAIN: None    9  PRIMARY DIAGNOSIS: Severe sepsis ([address] )      Secondary Diagnosis:         Pacemaker: no No      Internal Defib: No          Mental Health Diagnosis (if Applicable):    10  RESTRAINTS: No     11  RESPIRATORY NEEDS: Oxygen Device nasal cannula,    12  ISOLATION/PRECAUTION: None    13  ALLERGY: Metoprolol    14  SENSORY:       Vision Poor    15  SKIN CONDITION: Yes:  Stage (Pressure) I    16   DIET: Thickened Liquids    17  IV ACCESS: Saline Lock    18  PERSONAL ITEMS SENT WITH PATIENT: None    19  ATTACHED DOCUMENTS: MUST ATTACH CURRENT MEDICATION INFORMATION Other see attached    20  AT RISK ALERTS:Pressure Ulcer and Aspiration        HARM TO: N/A    21  WEIGHT BEARING STATUS:         Left Leg: None        Right Leg: None    22  MENTAL STATUS:Forgetful    23  FUNCTION:        Walk: With Help        Transfer: With Help        Toilet: With Help        Feed: With Help    24  IMMUNIZATIONS/SCREENING:     There is no immunization history on file for this patient  25  BOWEL: Incontinent     26  BLADDER: Incontinent    27   SENDING FACILITY CONTACT: tigre                  Title: rn        Unit: 2s        Phone: [phone]          [address] [place] (if known):        Title:        Unit:         Phone:         FORM PREFILLED BY (if applicable)       Title:       Unit:        Phone:         FORM COMPLETED BY Chino Jolley RN      Title: rn      Phone: [phone]

## 2021-12-29 NOTE — ASSESSMENT & PLAN NOTE
Patient transitioned to inpatient hospice and comfort measures only  Was previously admitted with aspiration pneumonia which was recurrent and patient unable to tolerate any feeds with family agreeable to hospice  She was approved for Inpatient Hospice

## 2021-12-30 NOTE — PLAN OF CARE
Problem: MOBILITY - ADULT  Goal: Maintain or return to baseline ADL function  Description: INTERVENTIONS:  -  Assess patient's ability to carry out ADLs; assess patient's baseline for ADL function and identify physical deficits which impact ability to perform ADLs (bathing, care of mouth/teeth, toileting, grooming, dressing, etc )  - Assess/evaluate cause of self-care deficits   - Assess range of motion  - Assess patient's mobility; develop plan if impaired  - Assess patient's need for assistive devices and provide as appropriate  - Encourage maximum independence but intervene and supervise when necessary  - Involve family in performance of ADLs  - Assess for home care needs following discharge   - Consider OT consult to assist with ADL evaluation and planning for discharge  - Provide patient education as appropriate  Outcome: Not Progressing

## 2021-12-30 NOTE — UTILIZATION REVIEW
Notification of Discharge   This is a Notification of Discharge from our facility [address]  Please be advised that this patient has been discharge from our facility  Below you will find the admission and discharge date and time including the patients disposition  UTILIZATION REVIEW CONTACT:  Kayli Louise  Utilization   Network Utilization Review Department  Phone: [phone] x carefully listen to the prompts  All voicemails are confidential   Email: [email or web address]     PHYSICIAN ADVISORY SERVICES:  FOR [id number] REVIEW - MEDICAL NECESSITY DENIAL  Phone: [phone]  Fax: [phone]  Email: [email or web address]     PRESENTATION DATE: 12/21/2021  8:51 PM  OBERVATION ADMISSION DATE:   INPATIENT ADMISSION DATE: 12/21/21 11:12 PM   DISCHARGE DATE: 12/29/2021  1:02 PM  DISPOSITION: [facility] [address] House/Swing Bed [facility] House/Swing Bed      IMPORTANT INFORMATION:  Send all requests for admission clinical reviews, approved or denied determinations and any other requests to dedicated fax number below belonging to the campus where the patient is receiving treatment   List of dedicated fax numbers:  [address] [place] [address] DENIALS (Administrative/Medical Necessity) [phone]   [address]  (Maternity/NICU/Pediatrics) [phone]   Arnulfo Min [phone]   [address] [facility] [phone]   [address] [place] [address] [phone]   [facility] [address] [place] [address] [phone]   [facility]  [phone]   [address] [place] [address], [address]  [address] [facility] [address] [facility] [phone]

## 2021-12-30 NOTE — PROGRESS NOTES
Progress Note - Valeria Aguirre 80 y o  female MRN: [id number]    Unit/Bed#: [address] Encounter: [id number]      Assessment/Plan:    * Hospice care  Assessment & Plan  Patient transitioned to inpatient hospice and comfort measures only  Was previously admitted with aspiration pneumonia which was recurrent and patient unable to tolerate any feeds with family agreeable to hospice  She was approved for Inpatient Hospice  Valeria Aguirre is a 80 y o  female who was admitted for Inpatient Hospice on 12/29    [facility] course prior to transitioning to Hospice:  Kisha Monte a 80 y o  female patient who originally presented to the hospital on 12/21/2021 and was admitted with severe sepsis and acute hypoxic respiratory failure secondary to HCAP aspiration pneumonia and admitted to ICU initially  She was placed on BiPap which she did not tolerate well and then placed on CPAP  Due to being treated for aspiration pneumonia with IV cefepime in nursing home prior to arrival to ED with suspected treatment failure, patient received IV meropenem and Vancomycin in ED    CXR showed dense infiltrates in RML/RLL and hazy opacification LLL  She wasn't a good candidate for NIPPV due to mental status and aspiration       She was continued on Zosyn IV with initial improvement in symptoms however subsequently worsened due to recurrent aspiration  Goals of care were discussed with family who were agreeable to transitioning patient to Hospice  Inpatient Hospice was consulted and accepted patient       Patient on day of discharge and readmission to inpatient Hospice converted to NPO given she had continued aspiration in order to make her more comfortable         Please see above list of diagnoses and related plan for additional information       Subjective:   Patient currently on hospice care-continuing comfort measures    Objective:     Vitals: Blood pressure 96/58, pulse 75, temperature (!) 96 5 °F (35 8 °C), temperature source Axillary, resp  rate 16, height 5' 3" (1 6 m), weight 67 6 kg (149 lb), SpO2 (!) 87 %  ,Body mass index is 26 39 kg/m²      No intake or output data in the 24 hours ending 12/30/21 0907    Physical Exam: General appearance: normal  Lungs: clear to auscultation bilaterally  Heart: regular rate and rhythm, S1, S2 normal, no murmur, click, rub or gallop     Invasive Devices  Report    Peripheral Intravenous Line            Peripheral IV 12/22/21 Right;Upper Arm 8 days    Peripheral IV 12/28/21 Right Arm 1 day          Drain            External Urinary Catheter <1 day                Lab, Imaging and other studies: hospice  VTE Pharmacologic Prophylaxis: Reason for no pharmacologic prophylaxis hospice  VTE Mechanical Prophylaxis: reason for no mechanical VTE prophylaxis hospice

## 2021-12-31 NOTE — DEATH NOTE
INPATIENT DEATH NOTE  Emerson Hays 80 y o  female MRN: [id number]  Unit/Bed#: [address] [address] Encounter: [id number]             PHYSICAL EXAM:  Unresponsive to noxious stimuli, Spontaneous respirations absent, Breath sounds absent, Carotid pulse absent, Heart sounds absent and Corneal blink reflex absent    Medical Examiner notification criteria:  NONE APPLICABLE   Medical Examiner's office notified?:  No, does not meet [place] notification criteria          Autopsy Options:  Post-mortem examination declined by next of kin    Primary Service Attending Physician notified?:  yes - Attending:  Huyen Bello MD    Physician/Resident responsible for completing Discharge Summary:  Peace Dumont MD

## 2022-01-01 NOTE — DISCHARGE SUMMARY
[facility]    Patient Information: Piotr Height 80 y o  female MRN: [id number]  Unit/Bed#: [address] [place] [address] Encounter: [id number]    Discharging Physician / Practitioner: Gavino Flowers MD  PCP: Morales Ansari DO  Admission Date: 12/29/2021  Discharge Date: 12/31/21    Reason for Admission: Respiratory failure 2/2 Recurrent aspiration pneumonia     Discharge Diagnoses:     Principal Problem:    Hospice care  Resolved Problems:    * No resolved hospital problems  *        * Hospice care  Assessment & Plan  Patient transitioned to inpatient hospice and comfort measures only  Was previously admitted with aspiration pneumonia which was recurrent and patient unable to tolerate any feeds with family agreeable to hospice  She was approved for Inpatient Hospice  Consultations During Hospital Stay:  None    Procedures Performed:     · BiPap, CPap, IV antibiotics     Significant Findings:     · Refer to hospital course and above listed diagnosis related plan for details    Imaging while in hospital:    XR chest 1 view portable    Result Date: 12/22/2021  Narrative: CHEST INDICATION:   sob  COMPARISON:  12/12/2021 EXAM PERFORMED/VIEWS:  XR CHEST PORTABLE FINDINGS: Stable cardiomediastinal structures  Patchy perihilar airspace disease within the right midlung field  Bibasilar opacities noted  Small bilateral pleural effusions are seen within the lateral lung bases  The upper lung fields are clear  No pneumothorax  Stable bony structures with no acute osseous abnormality  Impression: Bilateral airspace disease within the mid to lower lung field on the right and within the left lung base with bibasilar effusions  Findings are suggestive of multifocal pneumonia and consistent with the preliminary report provided by the emergency department  Workstation performed: [id number]     XR chest portable    Result Date: 12/14/2021  Narrative: CHEST INDICATION:   pneumonia   COMPARISON: Chest radiograph 12/7/2021  EXAM PERFORMED/VIEWS:  XR CHEST PORTABLE FINDINGS: Cardiomediastinal silhouette appears unremarkable  Mild pulmonary edema and bibasilar airspace opacities, similar to 12/7/2021  Small bilateral pleural effusions  No pneumothorax  Osseous structures appear within normal limits for patient age  Impression: Stable mild pulmonary edema, bibasilar airspace opacities and small bilateral pleural effusions compared to 12/7/2021  Workstation performed: [id number]     XR chest portable    Result Date: 12/8/2021  Narrative: CHEST INDICATION:   R side PNA  COMPARISON:  Compared with 12/3/2021 EXAM PERFORMED/VIEWS:  XR CHEST PORTABLE FINDINGS: Cardiomediastinal silhouette appears unremarkable  Prominent hilar vascular markings with patchy interstitial changes in the right lower lung with slight worsening  Similar changes in the left lower lung  New blunted costophrenic angles bilaterally  No pneumothorax   Osseous structures appear within normal limits for patient age  Impression: Congestive changes are new  Worsening right and new left lower lobe infiltrates with small effusions  Workstation performed: [id number]     XR chest 1 view portable    Result Date: 12/4/2021  Narrative: CHEST INDICATION:   resp distress  COMPARISON:  11/16/2021 EXAM PERFORMED/VIEWS:  XR CHEST PORTABLE  AP semierect Images:  1 FINDINGS: Cardiomediastinal silhouette appears unremarkable  Right middle lobe pneumonia  Trace left pleural effusion  No pneumothorax  Osseous structures appear within normal limits for patient age  Impression: Right middle lobe pneumonia  Trace left pleural effusion  Workstation performed: [id number]     XR hip/pelv 1 vw left if performed    Result Date: 12/9/2021  Narrative: LEFT HIP INDICATION:   s/p hemiarthroplasty  COMPARISON:  12/01/2021 VIEWS:  XR HIP/PELV 1 VW LEFT W PELVIS IF PERFORMED 1 image FINDINGS: Left hip hemiarthroplasty in good position   No significant hip degenerative changes  No lytic or blastic osseous lesion  Metallic staples in the soft tissues  Degenerative changes in the lower lumbar spine  Impression: Unremarkable left hip hemiarthroplasty  Workstation performed: [id number]     CT head without contrast    Result Date: 12/4/2021  Narrative: CT BRAIN - WITHOUT CONTRAST INDICATION:   Altered mental status  COMPARISON:  CT of the head on November 19, 2021  MRI of the brain on November 19, 2021  TECHNIQUE:  CT examination of the brain was performed  In addition to axial images, sagittal and coronal 2D reformatted images were created and submitted for interpretation  Radiation dose length product (DLP) for this visit:  936 mGy-cm   This examination, like all CT scans performed in the [facility], was performed utilizing techniques to minimize radiation dose exposure, including the use of iterative reconstruction and automated exposure control  IMAGE QUALITY:  Diagnostic  FINDINGS: PARENCHYMA: Decreased attenuation is noted in periventricular and subcortical white matter demonstrating an appearance that is statistically most likely to represent moderate microangiopathic change  Demarcation of previously seen small infarction in the right medial temporal lobe  (Series 2, image 18)  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 new since prior exam  No CT signs of acute infarction  No intracranial mass, mass effect or midline shift  No acute parenchymal hemorrhage  VENTRICLES AND EXTRA-AXIAL SPACES:  Normal for the patient's age  VISUALIZED ORBITS AND PARANASAL SINUSES:  Bilateral ocular lens replacements  CALVARIUM AND EXTRACRANIAL SOFT TISSUES:  Normal      Impression: 1  No acute intracranial hemorrhage, midline shift, or mass effect  2   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 is new since prior exam  Workstation performed: [id number]     FL barium swallow video w speech    Result Date: 2021  Narrative: A video barium swallow study was performed by the Department of Speech Pathology  Please refer to the report for the official interpretation  The images are stored for archival purposes only  Study images were not formally reviewed by the Radiology Department  Test Results Pending at Discharge (will require follow up):   · As per After Visit Summary     Outpatient Tests Requested:  · N/A     Complications:  Refer to hospital course and above listed diagnosis related plan, if any    Hospital Course: Samira abarca 80 y o  female who was admitted for Inpatient Hospice on  due to continued deterioration despite IV Antibiotics and ventilatory support    [facility] course prior to transitioning to Hospice:  Samira abarca 80 y o  female patient who originally presented to the hospital on 2021 and was admitted with severe sepsis and acute hypoxic respiratory failure secondary to HCAP aspiration pneumonia and admitted to ICU initially  She was placed on BiPap which she did not tolerate well and then placed on CPAP  Due to being treated for aspiration pneumonia with IV cefepime in nursing home prior to arrival to ED with suspected treatment failure, patient received IV meropenem and Vancomycin in ED    CXR showed dense infiltrates in RML/RLL and hazy opacification LLL  She wasn't a good candidate for NIPPV due to mental status and aspiration       She was continued on Zosyn IV with initial improvement in symptoms however subsequently worsened due to recurrent aspiration  Goals of care were discussed with family who were agreeable to transitioning patient to Hospice  Inpatient Hospice was consulted and accepted patient       Patient on day of discharge and readmission to inpatient Hospice converted to NPO given she had continued aspiration in order to make her more comfortable      Patient has  without event on  8:55 AM      Please see above list of diagnoses and related plan for additional information  Condition at Discharge:      Discharge Day Visit / Exam:     Subjective:  Patient was found unresponsive without breathing  Vitals: Blood Pressure: (!) 110/47 (21)  Pulse: 76 (21)  Temperature: (!) 96 4 °F (35 8 °C) (21)  Temp Source: Axillary (21)  Respirations: 20 (21)  Height: 5' 3" (160 cm) (21)  Weight - Scale: 67 4 kg (148 lb 9 4 oz) (21)  SpO2: 90 % (21)  Exam:   Physical Exam - please see inpatient death note for exam findings     Discharge instructions/Information to patient and family:(Discharge Medications and Follow up):   See after visit summary for information provided to patient and family  Provisions for Follow-Up Care:  See after visit summary for information related to follow-up care and any pertinent home health orders  Disposition:     Planned Readmission:  No     Discharge Statement:  I spent 35 minutes discharging the patient  This time was spent on the day of discharge  I had direct contact with the patient on the day of discharge  Greater than 50% of the total time was spent examining patient, answering all patient questions, arranging and discussing plan of care with patient as well as directly providing post-discharge instructions  Additional time then spent on discharge activities  Discharge Medications:  See after visit summary for reconciled discharge medications provided to patient and family  ** Please Note: "This note has been constructed using a voice recognition system  Therefore there may be syntax, spelling, and/or grammatical errors   Please call if you have any questions  "**

## (undated) DEVICE — BASIC DOUBLE BASIN 2-LF: Brand: MEDLINE INDUSTRIES, INC.

## (undated) DEVICE — BIPOLAR SEALER 23-113-1 AQM 2.3: Brand: AQUAMANTYS™

## (undated) DEVICE — EYE PADS 1 5/8"X2 5/8": Brand: MCKESSON

## (undated) DEVICE — AIR INJECT CANNULA 27GA: Brand: OPHTHALMIC CANNULA

## (undated) DEVICE — 0.9MM MICROSMOOTH NULTRA INFUSION SLEEVE KIT: Brand: INFINIT, MICROSMOOTH, ALCON

## (undated) DEVICE — CLEARCUT® SLIT KNIFE INTREPID MICRO-COAXIAL SYSTEM 2.4 SB: Brand: CLEARCUT®; INTREPID

## (undated) DEVICE — ASTOUND SURGICAL GOWN, XXX LARGE, X-LONG: Brand: CONVERTORS

## (undated) DEVICE — 3M™ STERI-DRAPE™ U-DRAPE 1015: Brand: STERI-DRAPE™

## (undated) DEVICE — 3M™ STERI-DRAPE™ LARGE X-RAY CASSETTE DRAPE 1009: Brand: STERI-DRAPE™

## (undated) DEVICE — DUAL CUT SAGITTAL BLADE

## (undated) DEVICE — EYE PACK CUSTOM -FINNEGAN

## (undated) DEVICE — BASIC ULTRASOUND: Brand: ALCON, INFINITI

## (undated) DEVICE — CAPIT HIP STEM POR PRIMARY

## (undated) DEVICE — SUT VICRYL 0 CP-1 27 IN J267H

## (undated) DEVICE — VEST SURGEON DISPOSABLE

## (undated) DEVICE — INSTRUMENT POUCH: Brand: CONVERTORS

## (undated) DEVICE — ELECTRODE BLADE E-Z CLEAN 6.5IN -0014

## (undated) DEVICE — 450 ML BOTTLE OF 0.05% CHLORHEXIDINE GLUCONATE IN 99.95% STERILE WATER FOR IRRIGATION, USP AND APPLICATOR.: Brand: IRRISEPT ANTIMICROBIAL WOUND LAVAGE

## (undated) DEVICE — GLOVE SRG BIOGEL 7.5

## (undated) DEVICE — 3M™ IOBAN™ 2 ANTIMICROBIAL INCISE DRAPE 6651EZ: Brand: IOBAN™ 2

## (undated) DEVICE — SUT VICRYL 2-0 CT-1 27 IN J259H

## (undated) DEVICE — GLOVE SRG BIOGEL 8.5

## (undated) DEVICE — GLOVE INDICATOR PI UNDERGLOVE SZ 8.5 BLUE

## (undated) DEVICE — 45° KELMAN®, 0.9 MM TURBOSONICS® MINI-FLARED ABS® TIP: Brand: ALCON, KELMAN, TURBOSONICS, MINI-FLARED ABS

## (undated) DEVICE — CHLORAPREP HI-LITE 26ML ORANGE

## (undated) DEVICE — GLOVE SRG BIOGEL 8

## (undated) DEVICE — INTENDED FOR TISSUE SEPARATION, AND OTHER PROCEDURES THAT REQUIRE A SHARP SURGICAL BLADE TO PUNCTURE OR CUT.: Brand: BARD-PARKER ® CARBON RIB-BACK BLADES

## (undated) DEVICE — HANDPIECE SET WITH HIGH FLOW TIP AND SUCTION TUBE: Brand: INTERPULSE

## (undated) DEVICE — PREP SURGICAL PURPREP 26ML

## (undated) DEVICE — SKIN MARKER DUAL TIP WITH RULER CAP, FLEXIBLE RULER AND LABELS: Brand: DEVON

## (undated) DEVICE — B-H IRRIGATING CAN 19GA FLAT ANGLED 8MM: Brand: OPHTHALMIC CANNULA

## (undated) DEVICE — TIBURON SPLIT SHEET: Brand: CONVERTORS

## (undated) DEVICE — DRAPE SHEET X-LG

## (undated) DEVICE — HOOD: Brand: FLYTE, SURGICOOL

## (undated) DEVICE — ORTHOPEDIC PACK: Brand: CARDINAL HEALTH

## (undated) DEVICE — DRESSING MEPILEX AG BORDER 4 X 10 IN

## (undated) DEVICE — TUBE MINI KAMVAC SUCTION 7310 7 LATEX FREE

## (undated) DEVICE — GLOVE INDICATOR PI UNDERGLOVE SZ 7.5 BLUE

## (undated) DEVICE — THE MONARCH® "D" CARTRIDGE IS A SINGLE-USE POLYPROPYLENE CARTRIDGE FOR POSTERIOR CHAMBER IOL DELIVERY: Brand: MONARCH® III

## (undated) DEVICE — SUT ETHIBOND 2 V-37 30 IN MX69G

## (undated) DEVICE — CAPIT HIP BIPOLAR HEAD POR PRIMARY

## (undated) DEVICE — ABDUCTION PILLOW FOAM POSITIONER: Brand: CARDINAL HEALTH